# Patient Record
Sex: FEMALE | Race: WHITE | NOT HISPANIC OR LATINO | Employment: FULL TIME | URBAN - METROPOLITAN AREA
[De-identification: names, ages, dates, MRNs, and addresses within clinical notes are randomized per-mention and may not be internally consistent; named-entity substitution may affect disease eponyms.]

---

## 2017-11-14 PROCEDURE — 88304 TISSUE EXAM BY PATHOLOGIST: CPT | Performed by: OTOLARYNGOLOGY

## 2017-11-16 ENCOUNTER — LAB REQUISITION (OUTPATIENT)
Dept: LAB | Facility: HOSPITAL | Age: 57
End: 2017-11-16
Payer: COMMERCIAL

## 2017-11-16 DIAGNOSIS — L72.11 PILAR CYST: ICD-10-CM

## 2018-04-10 LAB
T4 TOTAL (HISTORICAL): 8.33 UG/DL (ref 6.1–12.2)
TSH SERPL DL<=0.05 MIU/L-ACNC: 0.51 UIU/M (ref 0.45–5.33)

## 2018-09-08 ENCOUNTER — TRANSCRIBE ORDERS (OUTPATIENT)
Dept: ADMINISTRATIVE | Facility: HOSPITAL | Age: 58
End: 2018-09-08

## 2018-09-08 ENCOUNTER — APPOINTMENT (OUTPATIENT)
Dept: LAB | Facility: HOSPITAL | Age: 58
End: 2018-09-08

## 2018-09-08 DIAGNOSIS — Z00.8 HEALTH EXAMINATION IN POPULATION SURVEY: ICD-10-CM

## 2018-09-08 DIAGNOSIS — Z00.8 HEALTH EXAMINATION IN POPULATION SURVEY: Primary | ICD-10-CM

## 2018-09-08 LAB
CHOLEST SERPL-MCNC: 184 MG/DL (ref 50–200)
EST. AVERAGE GLUCOSE BLD GHB EST-MCNC: 114 MG/DL
HBA1C MFR BLD: 5.6 % (ref 4.2–6.3)
HDLC SERPL-MCNC: 52 MG/DL (ref 40–60)
LDLC SERPL CALC-MCNC: 118 MG/DL (ref 0–100)
NONHDLC SERPL-MCNC: 132 MG/DL
TRIGL SERPL-MCNC: 71 MG/DL

## 2018-09-08 PROCEDURE — 83036 HEMOGLOBIN GLYCOSYLATED A1C: CPT

## 2018-09-08 PROCEDURE — 80061 LIPID PANEL: CPT

## 2018-09-08 PROCEDURE — 36415 COLL VENOUS BLD VENIPUNCTURE: CPT

## 2019-03-27 ENCOUNTER — APPOINTMENT (OUTPATIENT)
Dept: LAB | Facility: CLINIC | Age: 59
End: 2019-03-27
Payer: COMMERCIAL

## 2019-03-27 ENCOUNTER — TRANSCRIBE ORDERS (OUTPATIENT)
Dept: ADMINISTRATIVE | Facility: HOSPITAL | Age: 59
End: 2019-03-27

## 2019-03-27 DIAGNOSIS — R94.6 NONSPECIFIC ABNORMAL RESULTS OF THYROID FUNCTION STUDY: ICD-10-CM

## 2019-03-27 DIAGNOSIS — Z13.6 SCREENING FOR ISCHEMIC HEART DISEASE: ICD-10-CM

## 2019-03-27 DIAGNOSIS — R94.6 NONSPECIFIC ABNORMAL RESULTS OF THYROID FUNCTION STUDY: Primary | ICD-10-CM

## 2019-03-27 LAB
ALBUMIN SERPL BCP-MCNC: 4 G/DL (ref 3.5–5)
ALP SERPL-CCNC: 77 U/L (ref 46–116)
ALT SERPL W P-5'-P-CCNC: 22 U/L (ref 12–78)
ANION GAP SERPL CALCULATED.3IONS-SCNC: 3 MMOL/L (ref 4–13)
AST SERPL W P-5'-P-CCNC: 14 U/L (ref 5–45)
BASOPHILS # BLD AUTO: 0.09 THOUSANDS/ΜL (ref 0–0.1)
BASOPHILS NFR BLD AUTO: 2 % (ref 0–1)
BILIRUB SERPL-MCNC: 0.86 MG/DL (ref 0.2–1)
BUN SERPL-MCNC: 17 MG/DL (ref 5–25)
CALCIUM SERPL-MCNC: 9 MG/DL (ref 8.3–10.1)
CHLORIDE SERPL-SCNC: 107 MMOL/L (ref 100–108)
CHOLEST SERPL-MCNC: 157 MG/DL (ref 50–200)
CO2 SERPL-SCNC: 28 MMOL/L (ref 21–32)
CREAT SERPL-MCNC: 0.74 MG/DL (ref 0.6–1.3)
EOSINOPHIL # BLD AUTO: 0.09 THOUSAND/ΜL (ref 0–0.61)
EOSINOPHIL NFR BLD AUTO: 2 % (ref 0–6)
ERYTHROCYTE [DISTWIDTH] IN BLOOD BY AUTOMATED COUNT: 12.8 % (ref 11.6–15.1)
GFR SERPL CREATININE-BSD FRML MDRD: 90 ML/MIN/1.73SQ M
GLUCOSE P FAST SERPL-MCNC: 89 MG/DL (ref 65–99)
HCT VFR BLD AUTO: 41.4 % (ref 34.8–46.1)
HDLC SERPL-MCNC: 51 MG/DL (ref 40–60)
HGB BLD-MCNC: 12.9 G/DL (ref 11.5–15.4)
IMM GRANULOCYTES # BLD AUTO: 0.01 THOUSAND/UL (ref 0–0.2)
IMM GRANULOCYTES NFR BLD AUTO: 0 % (ref 0–2)
LDLC SERPL CALC-MCNC: 97 MG/DL (ref 0–100)
LYMPHOCYTES # BLD AUTO: 1.98 THOUSANDS/ΜL (ref 0.6–4.47)
LYMPHOCYTES NFR BLD AUTO: 42 % (ref 14–44)
MCH RBC QN AUTO: 30 PG (ref 26.8–34.3)
MCHC RBC AUTO-ENTMCNC: 31.2 G/DL (ref 31.4–37.4)
MCV RBC AUTO: 96 FL (ref 82–98)
MONOCYTES # BLD AUTO: 0.4 THOUSAND/ΜL (ref 0.17–1.22)
MONOCYTES NFR BLD AUTO: 9 % (ref 4–12)
NEUTROPHILS # BLD AUTO: 2.16 THOUSANDS/ΜL (ref 1.85–7.62)
NEUTS SEG NFR BLD AUTO: 45 % (ref 43–75)
NONHDLC SERPL-MCNC: 106 MG/DL
NRBC BLD AUTO-RTO: 0 /100 WBCS
PLATELET # BLD AUTO: 205 THOUSANDS/UL (ref 149–390)
PMV BLD AUTO: 12.6 FL (ref 8.9–12.7)
POTASSIUM SERPL-SCNC: 4 MMOL/L (ref 3.5–5.3)
PROT SERPL-MCNC: 7.4 G/DL (ref 6.4–8.2)
RBC # BLD AUTO: 4.3 MILLION/UL (ref 3.81–5.12)
SODIUM SERPL-SCNC: 138 MMOL/L (ref 136–145)
T4 SERPL-MCNC: 10.3 UG/DL (ref 4.7–13.3)
TRIGL SERPL-MCNC: 47 MG/DL
TSH SERPL DL<=0.05 MIU/L-ACNC: 0.93 UIU/ML (ref 0.36–3.74)
WBC # BLD AUTO: 4.73 THOUSAND/UL (ref 4.31–10.16)

## 2019-03-27 PROCEDURE — 85025 COMPLETE CBC W/AUTO DIFF WBC: CPT

## 2019-03-27 PROCEDURE — 84436 ASSAY OF TOTAL THYROXINE: CPT

## 2019-03-27 PROCEDURE — 36415 COLL VENOUS BLD VENIPUNCTURE: CPT

## 2019-03-27 PROCEDURE — 84443 ASSAY THYROID STIM HORMONE: CPT

## 2019-03-27 PROCEDURE — 80053 COMPREHEN METABOLIC PANEL: CPT

## 2019-03-27 PROCEDURE — 80061 LIPID PANEL: CPT

## 2019-03-29 ENCOUNTER — HOSPITAL ENCOUNTER (OUTPATIENT)
Dept: RADIOLOGY | Facility: HOSPITAL | Age: 59
Discharge: HOME | End: 2019-03-29
Payer: COMMERCIAL

## 2019-03-29 ENCOUNTER — TRANSCRIBE ORDERS (OUTPATIENT)
Dept: ADMINISTRATIVE | Facility: HOSPITAL | Age: 59
End: 2019-03-29

## 2019-03-29 DIAGNOSIS — E04.1 NONTOXIC UNINODULAR GOITER: Primary | ICD-10-CM

## 2019-03-29 DIAGNOSIS — R06.02 SHORTNESS OF BREATH: ICD-10-CM

## 2019-03-29 PROCEDURE — 71046 X-RAY EXAM CHEST 2 VIEWS: CPT

## 2019-04-01 ENCOUNTER — HOSPITAL ENCOUNTER (OUTPATIENT)
Dept: ULTRASOUND IMAGING | Facility: HOSPITAL | Age: 59
Discharge: HOME | End: 2019-04-01
Payer: COMMERCIAL

## 2019-04-01 DIAGNOSIS — E04.1 NONTOXIC UNINODULAR GOITER: ICD-10-CM

## 2019-04-01 PROCEDURE — 76536 US EXAM OF HEAD AND NECK: CPT

## 2019-04-16 ENCOUNTER — TRANSCRIBE ORDERS (OUTPATIENT)
Dept: ADMINISTRATIVE | Facility: HOSPITAL | Age: 59
End: 2019-04-16

## 2019-04-16 ENCOUNTER — APPOINTMENT (OUTPATIENT)
Dept: LAB | Facility: HOSPITAL | Age: 59
End: 2019-04-16
Payer: COMMERCIAL

## 2019-04-16 DIAGNOSIS — J06.9 ACUTE RESPIRATORY DISEASE: Primary | ICD-10-CM

## 2019-04-16 DIAGNOSIS — J06.9 ACUTE RESPIRATORY DISEASE: ICD-10-CM

## 2019-04-16 PROCEDURE — 87631 RESP VIRUS 3-5 TARGETS: CPT

## 2019-04-17 LAB
FLUAV AG SPEC QL: NOT DETECTED
FLUBV AG SPEC QL: NOT DETECTED
RSV B RNA SPEC QL NAA+PROBE: NOT DETECTED

## 2019-06-07 ENCOUNTER — TRANSCRIBE ORDERS (OUTPATIENT)
Dept: ADMINISTRATIVE | Facility: HOSPITAL | Age: 59
End: 2019-06-07

## 2019-06-07 DIAGNOSIS — Z12.39 BREAST SCREENING, UNSPECIFIED: Primary | ICD-10-CM

## 2019-06-08 ENCOUNTER — HOSPITAL ENCOUNTER (OUTPATIENT)
Dept: MAMMOGRAPHY | Facility: HOSPITAL | Age: 59
Discharge: HOME | End: 2019-06-08
Attending: SPECIALIST
Payer: COMMERCIAL

## 2019-06-08 VITALS — BODY MASS INDEX: 28.88 KG/M2 | WEIGHT: 195 LBS | HEIGHT: 69 IN

## 2019-06-08 DIAGNOSIS — Z12.39 BREAST SCREENING, UNSPECIFIED: ICD-10-CM

## 2019-06-08 PROCEDURE — 77067 SCR MAMMO BI INCL CAD: CPT

## 2019-06-08 PROCEDURE — 77063 BREAST TOMOSYNTHESIS BI: CPT

## 2019-09-24 ENCOUNTER — TRANSCRIBE ORDERS (OUTPATIENT)
Dept: ADMINISTRATIVE | Facility: HOSPITAL | Age: 59
End: 2019-09-24

## 2019-09-24 DIAGNOSIS — R22.41 LOWER LEG MASS, RIGHT: Primary | ICD-10-CM

## 2019-09-24 DIAGNOSIS — M79.604 RIGHT LEG PAIN: ICD-10-CM

## 2019-09-25 ENCOUNTER — HOSPITAL ENCOUNTER (OUTPATIENT)
Dept: NON INVASIVE DIAGNOSTICS | Facility: CLINIC | Age: 59
Discharge: HOME | End: 2019-09-25
Payer: COMMERCIAL

## 2019-09-25 DIAGNOSIS — M79.604 RIGHT LEG PAIN: ICD-10-CM

## 2019-09-25 DIAGNOSIS — R22.41 LOWER LEG MASS, RIGHT: ICD-10-CM

## 2019-09-25 PROCEDURE — 93971 EXTREMITY STUDY: CPT

## 2019-09-25 PROCEDURE — 93971 EXTREMITY STUDY: CPT | Performed by: SURGERY

## 2019-09-27 ENCOUNTER — TRANSCRIBE ORDERS (OUTPATIENT)
Dept: ADMINISTRATIVE | Facility: HOSPITAL | Age: 59
End: 2019-09-27

## 2019-10-07 ENCOUNTER — APPOINTMENT (OUTPATIENT)
Dept: RADIOLOGY | Facility: CLINIC | Age: 59
End: 2019-10-07
Payer: COMMERCIAL

## 2019-10-07 ENCOUNTER — TRANSCRIBE ORDERS (OUTPATIENT)
Dept: ADMINISTRATIVE | Facility: HOSPITAL | Age: 59
End: 2019-10-07

## 2019-10-07 DIAGNOSIS — M25.561 RIGHT KNEE PAIN, UNSPECIFIED CHRONICITY: Primary | ICD-10-CM

## 2019-10-07 DIAGNOSIS — R22.41 KNEE MASS, RIGHT: ICD-10-CM

## 2019-10-07 PROCEDURE — 73560 X-RAY EXAM OF KNEE 1 OR 2: CPT

## 2020-02-19 ENCOUNTER — OCCMED (OUTPATIENT)
Dept: URGENT CARE | Facility: CLINIC | Age: 60
End: 2020-02-19
Payer: OTHER MISCELLANEOUS

## 2020-02-19 ENCOUNTER — APPOINTMENT (OUTPATIENT)
Dept: RADIOLOGY | Facility: CLINIC | Age: 60
End: 2020-02-19
Payer: OTHER MISCELLANEOUS

## 2020-02-19 DIAGNOSIS — R07.81 RIB PAIN ON RIGHT SIDE: Primary | ICD-10-CM

## 2020-02-19 DIAGNOSIS — R07.81 RIB PAIN ON RIGHT SIDE: ICD-10-CM

## 2020-02-19 PROCEDURE — 99283 EMERGENCY DEPT VISIT LOW MDM: CPT | Performed by: PHYSICIAN ASSISTANT

## 2020-02-19 PROCEDURE — 71101 X-RAY EXAM UNILAT RIBS/CHEST: CPT

## 2020-02-19 PROCEDURE — G0382 LEV 3 HOSP TYPE B ED VISIT: HCPCS | Performed by: PHYSICIAN ASSISTANT

## 2020-02-25 ENCOUNTER — APPOINTMENT (OUTPATIENT)
Dept: URGENT CARE | Facility: CLINIC | Age: 60
End: 2020-02-25
Payer: OTHER MISCELLANEOUS

## 2020-02-25 PROCEDURE — 99213 OFFICE O/P EST LOW 20 MIN: CPT

## 2020-08-25 ENCOUNTER — LAB REQUISITION (OUTPATIENT)
Dept: LAB | Facility: HOSPITAL | Age: 60
End: 2020-08-25
Payer: COMMERCIAL

## 2020-08-25 ENCOUNTER — TRANSCRIBE ORDERS (OUTPATIENT)
Dept: ADMINISTRATIVE | Facility: HOSPITAL | Age: 60
End: 2020-08-25

## 2020-08-25 DIAGNOSIS — Z12.31 ENCOUNTER FOR SCREENING MAMMOGRAM FOR MALIGNANT NEOPLASM OF BREAST: Primary | ICD-10-CM

## 2020-08-25 DIAGNOSIS — Z01.419 ENCOUNTER FOR GYNECOLOGICAL EXAMINATION (GENERAL) (ROUTINE) WITHOUT ABNORMAL FINDINGS: ICD-10-CM

## 2020-08-26 ENCOUNTER — HOSPITAL ENCOUNTER (OUTPATIENT)
Dept: MAMMOGRAPHY | Facility: HOSPITAL | Age: 60
Discharge: HOME | End: 2020-08-26
Attending: SPECIALIST
Payer: COMMERCIAL

## 2020-08-26 VITALS — WEIGHT: 200 LBS | HEIGHT: 69 IN | BODY MASS INDEX: 29.62 KG/M2

## 2020-08-26 DIAGNOSIS — Z12.31 ENCOUNTER FOR SCREENING MAMMOGRAM FOR MALIGNANT NEOPLASM OF BREAST: ICD-10-CM

## 2020-08-26 PROCEDURE — 77067 SCR MAMMO BI INCL CAD: CPT

## 2020-08-26 PROCEDURE — 88175 CYTOPATH C/V AUTO FLUID REDO: CPT | Performed by: SPECIALIST

## 2020-08-26 PROCEDURE — 77063 BREAST TOMOSYNTHESIS BI: CPT

## 2020-09-01 LAB
LAB AP GYN PRIMARY INTERPRETATION: NORMAL
Lab: NORMAL

## 2020-09-02 PROBLEM — R06.09 DOE (DYSPNEA ON EXERTION): Status: ACTIVE | Noted: 2019-09-19

## 2020-09-02 PROBLEM — R06.00 DOE (DYSPNEA ON EXERTION): Status: ACTIVE | Noted: 2019-09-19

## 2020-09-02 RX ORDER — ALBUTEROL SULFATE 90 UG/1
1 AEROSOL, METERED RESPIRATORY (INHALATION) EVERY 6 HOURS PRN
COMMUNITY
Start: 2019-09-19 | End: 2020-09-03 | Stop reason: ALTCHOICE

## 2020-09-02 NOTE — PROGRESS NOTES
Darlene Ly 1960 female MRN: [id number]      ASSESSMENT/PLAN  Problem List Items Addressed This Visit        Other    Healthcare maintenance - Primary    Lump of right thigh    Relevant Orders    US extremity soft tissue      Other Visit Diagnoses     Screening for diabetes mellitus        Relevant Orders    Comprehensive metabolic panel    Screening, lipid        Relevant Orders    Lipid panel    Encounter for hepatitis C screening test for low risk patient        Relevant Orders    Hepatitis C antibody    Screening for HIV (human immunodeficiency virus)        Relevant Orders    HIV 1/2 Antigen/Antibody (4th Generation) w Reflex [facility]        BP WNL  BMI as below   CMP + Lipids to screen for HLD, DM   HIV, Hep C screening ordered, pt agreeable    Mammo UTD (08/2020)  Pap UTD (08/2020)   CRC Screening: Has a colonoscopy at 49 with Dr Tana Victor in [facility] -- normal, but incomplete     BMI Counseling: Body mass index is 30 04 kg/m²  The BMI is above normal  Nutrition recommendations include 3-5 servings of fruits/vegetables daily and consuming healthier snacks  Exercise recommendations include exercising 3-5 times per week  Encouraged regular dental and eye exams  No future appointments  SUBJECTIVE  CC: Establish Care (believes to lypoma to have on the back of leg - had it checked out many years ago - just here for a routine check up )      HPI:  Darlene Ly is a 63 y o  female who presents to establish care  History reviewed and updated as below  Years ago, had a lump on the back of her R thigh  Thinks it is a lipoma, and would like to have it evaluated  Review of Systems   Constitutional: Negative for unexpected weight change  HENT: Negative for congestion, ear pain, rhinorrhea and sore throat  Eyes: Negative for visual disturbance  Respiratory: Negative for shortness of breath  Cardiovascular: Negative for chest pain, palpitations and leg swelling     Gastrointestinal: Negative for abdominal pain, constipation and diarrhea  Endocrine: Negative for polyuria  Genitourinary: Negative for dysuria and menstrual problem (stopped at 50)  Neurological: Positive for headaches (if she doesn't sleep enough, or drinks too much caffiene)  Negative for dizziness and light-headedness  Psychiatric/Behavioral: Negative for sleep disturbance (on occasion)  Historical Information   The patient history was reviewed and updated as follows:    Past Medical History:   Diagnosis Date    No pertinent past medical history      Past Surgical History:   Procedure Laterality Date    ANKLE SURGERY      ROTATOR CUFF REPAIR Right     THYROIDECTOMY, PARTIAL      TUBAL LIGATION      VARICOSE VEIN SURGERY       Family History   Problem Relation Age of Onset    No Known Problems Mother     Diabetes Father     Lymphoma Sister     No Known Problems Daughter     No Known Problems Sister     Lymphoma Daughter     No Known Problems Daughter     No Known Problems Maternal Aunt       Social History   Social History     Substance and Sexual Activity   Alcohol Use Not Currently     Social History     Substance and Sexual Activity   Drug Use Not Currently     Social History     Tobacco Use   Smoking Status Never Smoker   Smokeless Tobacco Never Used       Medications:   No current outpatient medications on file  No Known Allergies    OBJECTIVE    Vitals:   Vitals:    09/03/20 0806   BP: 108/62   Pulse: 59   Temp: (!) 97 2 °F (36 2 °C)   SpO2: 96%   Weight: 92 3 kg (203 lb 6 4 oz)   Height: 5' 9" (1 753 m)           Physical Exam  Vitals signs and nursing note reviewed  Constitutional:       General: She is not in acute distress  Appearance: Normal appearance  HENT:      Head: Normocephalic and atraumatic        Right Ear: Tympanic membrane and ear canal normal       Left Ear: Tympanic membrane and ear canal normal       Nose: Nose normal       Mouth/Throat:      Mouth: Mucous membranes are moist       Pharynx: No oropharyngeal exudate or posterior oropharyngeal erythema  Eyes:      Conjunctiva/sclera: Conjunctivae normal    Cardiovascular:      Rate and Rhythm: Normal rate and regular rhythm  Pulmonary:      Effort: Pulmonary effort is normal  No respiratory distress  Breath sounds: Normal breath sounds  Abdominal:      General: Bowel sounds are normal  There is no distension  Palpations: Abdomen is soft  Tenderness: There is no abdominal tenderness  Musculoskeletal:      Right lower leg: No edema  Left lower leg: No edema  Legs:    Lymphadenopathy:      Cervical: No cervical adenopathy  Skin:     General: Skin is warm and dry  Neurological:      General: No focal deficit present  Mental Status: She is alert     Psychiatric:         Mood and Affect: Mood normal                     Noemí Satnizo DO  [facility]   9/3/2020  8:35 AM

## 2020-09-03 ENCOUNTER — TELEPHONE (OUTPATIENT)
Dept: FAMILY MEDICINE CLINIC | Facility: CLINIC | Age: 60
End: 2020-09-03

## 2020-09-03 ENCOUNTER — OFFICE VISIT (OUTPATIENT)
Dept: FAMILY MEDICINE CLINIC | Facility: CLINIC | Age: 60
End: 2020-09-03
Payer: COMMERCIAL

## 2020-09-03 ENCOUNTER — TELEPHONE (OUTPATIENT)
Dept: ADMINISTRATIVE | Facility: OTHER | Age: 60
End: 2020-09-03

## 2020-09-03 VITALS
HEIGHT: 69 IN | TEMPERATURE: 97.2 F | SYSTOLIC BLOOD PRESSURE: 108 MMHG | WEIGHT: 203.4 LBS | OXYGEN SATURATION: 96 % | BODY MASS INDEX: 30.13 KG/M2 | DIASTOLIC BLOOD PRESSURE: 62 MMHG | HEART RATE: 59 BPM

## 2020-09-03 DIAGNOSIS — Z00.00 HEALTHCARE MAINTENANCE: Primary | ICD-10-CM

## 2020-09-03 DIAGNOSIS — Z86.2 HISTORY OF ANEMIA: Primary | ICD-10-CM

## 2020-09-03 DIAGNOSIS — Z13.220 SCREENING, LIPID: ICD-10-CM

## 2020-09-03 DIAGNOSIS — Z11.59 ENCOUNTER FOR HEPATITIS C SCREENING TEST FOR LOW RISK PATIENT: ICD-10-CM

## 2020-09-03 DIAGNOSIS — Z11.4 SCREENING FOR HIV (HUMAN IMMUNODEFICIENCY VIRUS): ICD-10-CM

## 2020-09-03 DIAGNOSIS — Z13.1 SCREENING FOR DIABETES MELLITUS: ICD-10-CM

## 2020-09-03 DIAGNOSIS — R22.41 LUMP OF RIGHT THIGH: ICD-10-CM

## 2020-09-03 PROBLEM — R06.09 DOE (DYSPNEA ON EXERTION): Status: RESOLVED | Noted: 2019-09-19 | Resolved: 2020-09-03

## 2020-09-03 PROBLEM — R06.00 DOE (DYSPNEA ON EXERTION): Status: RESOLVED | Noted: 2019-09-19 | Resolved: 2020-09-03

## 2020-09-03 PROCEDURE — 99386 PREV VISIT NEW AGE 40-64: CPT | Performed by: FAMILY MEDICINE

## 2020-09-03 NOTE — TELEPHONE ENCOUNTER
Upon review of the In Basket request and the patient's chart, initial outreach has been made via fax, please see Contacts section for details  A second outreach attempt will be made within 5 business days      Thank you  Inderjit Coffman

## 2020-09-03 NOTE — LETTER
Procedure Request Form: Colonoscopy      Date Requested: 20  Patient: Little Amarilis  Patient : 1960   Referring Provider: Sandra Powers, DO        Date of Procedure ______________________________       The above patient has informed us that they have completed their   most recent Colonoscopy at your facility  Please complete   this form and attach all corresponding procedure reports/results  Comments __________________________________________________________  ____________________________________________________________________  ____________________________________________________________________  ____________________________________________________________________    Facility Completing Procedure _________________________________________    Form Completed By (print name) _______________________________________      Signature __________________________________________________________      These reports are needed for  compliance    Please fax this completed form and a copy of the procedure report to our office located at [place] [address] as soon as possible to [phone] debbie Soler: Phone [phone]    We thank you for your assistance in treating our mutual patient

## 2020-09-03 NOTE — TELEPHONE ENCOUNTER
Pt forgot to mention she has a history of anemia  The dr usually did a CBC with Dif routinely  One was not ordered  Can you please order shes doing her labs in he morning      Thanks

## 2020-09-03 NOTE — PROGRESS NOTES
BMI Counseling: Body mass index is 30 04 kg/m²  The BMI is above normal  Nutrition recommendations include reducing portion sizes and 3-5 servings of fruits/vegetables daily

## 2020-09-03 NOTE — LETTER
Procedure Request Form: Colonoscopy      Date Requested: 20  Patient: Radha Amabile  Patient : 1960   Referring Provider: Nick Chalet, DO        Date of Procedure ______________________________       The above patient has informed us that they have completed their   most recent Colonoscopy at your facility  Please complete   this form and attach all corresponding procedure reports/results  Comments __________________________________________________________  ____________________________________________________________________  ____________________________________________________________________  ____________________________________________________________________    Facility Completing Procedure _________________________________________    Form Completed By (print name) _______________________________________      Signature __________________________________________________________      These reports are needed for  compliance    Please fax this completed form and a copy of the procedure report to our office located at [place] [address] as soon as possible to [phone] debbie Soler: Phone [phone]    We thank you for your assistance in treating our mutual patient

## 2020-09-04 ENCOUNTER — APPOINTMENT (OUTPATIENT)
Dept: LAB | Facility: CLINIC | Age: 60
End: 2020-09-04
Payer: COMMERCIAL

## 2020-09-04 DIAGNOSIS — Z13.1 SCREENING FOR DIABETES MELLITUS: ICD-10-CM

## 2020-09-04 DIAGNOSIS — Z86.2 HISTORY OF ANEMIA: ICD-10-CM

## 2020-09-04 DIAGNOSIS — Z11.59 ENCOUNTER FOR HEPATITIS C SCREENING TEST FOR LOW RISK PATIENT: ICD-10-CM

## 2020-09-04 DIAGNOSIS — Z11.4 SCREENING FOR HIV (HUMAN IMMUNODEFICIENCY VIRUS): ICD-10-CM

## 2020-09-04 DIAGNOSIS — Z13.220 SCREENING, LIPID: ICD-10-CM

## 2020-09-04 LAB
ALBUMIN SERPL BCP-MCNC: 4 G/DL (ref 3.5–5)
ALP SERPL-CCNC: 75 U/L (ref 46–116)
ALT SERPL W P-5'-P-CCNC: 20 U/L (ref 12–78)
ANION GAP SERPL CALCULATED.3IONS-SCNC: 5 MMOL/L (ref 4–13)
AST SERPL W P-5'-P-CCNC: 12 U/L (ref 5–45)
BASOPHILS # BLD AUTO: 0.09 THOUSANDS/ΜL (ref 0–0.1)
BASOPHILS NFR BLD AUTO: 2 % (ref 0–1)
BILIRUB SERPL-MCNC: 1.21 MG/DL (ref 0.2–1)
BUN SERPL-MCNC: 21 MG/DL (ref 5–25)
CALCIUM SERPL-MCNC: 9.4 MG/DL (ref 8.3–10.1)
CHLORIDE SERPL-SCNC: 107 MMOL/L (ref 100–108)
CHOLEST SERPL-MCNC: 186 MG/DL (ref 50–200)
CO2 SERPL-SCNC: 29 MMOL/L (ref 21–32)
CREAT SERPL-MCNC: 0.77 MG/DL (ref 0.6–1.3)
EOSINOPHIL # BLD AUTO: 0.15 THOUSAND/ΜL (ref 0–0.61)
EOSINOPHIL NFR BLD AUTO: 3 % (ref 0–6)
ERYTHROCYTE [DISTWIDTH] IN BLOOD BY AUTOMATED COUNT: 12.7 % (ref 11.6–15.1)
GFR SERPL CREATININE-BSD FRML MDRD: 85 ML/MIN/1.73SQ M
GLUCOSE P FAST SERPL-MCNC: 83 MG/DL (ref 65–99)
HCT VFR BLD AUTO: 41.8 % (ref 34.8–46.1)
HCV AB SER QL: NORMAL
HDLC SERPL-MCNC: 54 MG/DL
HGB BLD-MCNC: 13.1 G/DL (ref 11.5–15.4)
IMM GRANULOCYTES # BLD AUTO: 0.01 THOUSAND/UL (ref 0–0.2)
IMM GRANULOCYTES NFR BLD AUTO: 0 % (ref 0–2)
LDLC SERPL CALC-MCNC: 119 MG/DL (ref 0–100)
LYMPHOCYTES # BLD AUTO: 1.8 THOUSANDS/ΜL (ref 0.6–4.47)
LYMPHOCYTES NFR BLD AUTO: 34 % (ref 14–44)
MCH RBC QN AUTO: 30.3 PG (ref 26.8–34.3)
MCHC RBC AUTO-ENTMCNC: 31.3 G/DL (ref 31.4–37.4)
MCV RBC AUTO: 97 FL (ref 82–98)
MONOCYTES # BLD AUTO: 0.44 THOUSAND/ΜL (ref 0.17–1.22)
MONOCYTES NFR BLD AUTO: 8 % (ref 4–12)
NEUTROPHILS # BLD AUTO: 2.8 THOUSANDS/ΜL (ref 1.85–7.62)
NEUTS SEG NFR BLD AUTO: 53 % (ref 43–75)
NONHDLC SERPL-MCNC: 132 MG/DL
NRBC BLD AUTO-RTO: 0 /100 WBCS
PLATELET # BLD AUTO: 229 THOUSANDS/UL (ref 149–390)
PMV BLD AUTO: 11.6 FL (ref 8.9–12.7)
POTASSIUM SERPL-SCNC: 4.5 MMOL/L (ref 3.5–5.3)
PROT SERPL-MCNC: 7.2 G/DL (ref 6.4–8.2)
RBC # BLD AUTO: 4.33 MILLION/UL (ref 3.81–5.12)
SODIUM SERPL-SCNC: 141 MMOL/L (ref 136–145)
TRIGL SERPL-MCNC: 63 MG/DL
WBC # BLD AUTO: 5.29 THOUSAND/UL (ref 4.31–10.16)

## 2020-09-04 PROCEDURE — 87389 HIV-1 AG W/HIV-1&-2 AB AG IA: CPT

## 2020-09-04 PROCEDURE — 36415 COLL VENOUS BLD VENIPUNCTURE: CPT

## 2020-09-04 PROCEDURE — 80061 LIPID PANEL: CPT

## 2020-09-04 PROCEDURE — 86803 HEPATITIS C AB TEST: CPT

## 2020-09-04 PROCEDURE — 85025 COMPLETE CBC W/AUTO DIFF WBC: CPT

## 2020-09-04 PROCEDURE — 80053 COMPREHEN METABOLIC PANEL: CPT

## 2020-09-09 LAB — HIV 1+2 AB+HIV1 P24 AG SERPL QL IA: NORMAL

## 2020-09-09 NOTE — TELEPHONE ENCOUNTER
As a follow-up, a second attempt has been made for outreach via fax, please see Contacts section for details  A third and final attempt will be made within 5 business days      Thank you  Mandi Byrnes

## 2020-09-14 NOTE — TELEPHONE ENCOUNTER
As a final attempt, a third outreach has been made via telephone call  Please see Contacts section for details  This encounter will be closed and completed by end of day  Should we receive the requested information because of previous outreach attempts, the requested patient's chart will be updated appropriately       Thank you  Larose Fothergill

## 2020-10-07 ENCOUNTER — TRANSCRIBE ORDERS (OUTPATIENT)
Dept: ADMINISTRATIVE | Facility: HOSPITAL | Age: 60
End: 2020-10-07

## 2020-10-07 DIAGNOSIS — R10.9 STOMACH ACHE: Primary | ICD-10-CM

## 2020-10-12 ENCOUNTER — TRANSCRIBE ORDERS (OUTPATIENT)
Dept: LAB | Facility: HOSPITAL | Age: 60
End: 2020-10-12

## 2020-10-12 ENCOUNTER — HOSPITAL ENCOUNTER (OUTPATIENT)
Dept: ULTRASOUND IMAGING | Facility: HOSPITAL | Age: 60
Discharge: HOME | End: 2020-10-12
Payer: COMMERCIAL

## 2020-10-12 ENCOUNTER — LAB (OUTPATIENT)
Dept: LAB | Facility: HOSPITAL | Age: 60
End: 2020-10-12
Payer: COMMERCIAL

## 2020-10-12 DIAGNOSIS — R10.9 STOMACH ACHE: ICD-10-CM

## 2020-10-12 DIAGNOSIS — R22.41 LUMP OF RIGHT THIGH: ICD-10-CM

## 2020-10-12 DIAGNOSIS — R10.32 LLQ PAIN: ICD-10-CM

## 2020-10-12 DIAGNOSIS — R10.9 STOMACH ACHE: Primary | ICD-10-CM

## 2020-10-12 LAB
ALBUMIN SERPL BCP-MCNC: 4.5 G/DL (ref 3.5–5.7)
ALP SERPL-CCNC: 58 U/L (ref 40–150)
ALT SERPL W P-5'-P-CCNC: 13 U/L (ref 7–52)
ANION GAP SERPL CALCULATED.3IONS-SCNC: 5 MMOL/L (ref 4–13)
AST SERPL W P-5'-P-CCNC: 14 U/L (ref 13–39)
BILIRUB DIRECT SERPL-MCNC: 0.2 MG/DL (ref 0–0.2)
BILIRUB SERPL-MCNC: 1.2 MG/DL (ref 0.2–1)
BILIRUB UR QL STRIP: NEGATIVE
BUN SERPL-MCNC: 15 MG/DL (ref 7–25)
CALCIUM SERPL-MCNC: 9.5 MG/DL (ref 8.6–10.5)
CHLORIDE SERPL-SCNC: 101 MMOL/L (ref 98–107)
CLARITY UR: CLEAR
CO2 SERPL-SCNC: 31 MMOL/L (ref 21–31)
COLOR UR: ABNORMAL
CREAT SERPL-MCNC: 0.66 MG/DL (ref 0.6–1.2)
ERYTHROCYTE [DISTWIDTH] IN BLOOD BY AUTOMATED COUNT: 13 % (ref 11.5–14.5)
GFR SERPL CREATININE-BSD FRML MDRD: 98 ML/MIN/1.73SQ M
GLUCOSE P FAST SERPL-MCNC: 93 MG/DL (ref 65–99)
GLUCOSE UR STRIP-MCNC: NEGATIVE MG/DL
HCT VFR BLD AUTO: 39.4 % (ref 42–47)
HGB BLD-MCNC: 13.5 G/DL (ref 12–16)
HGB UR QL STRIP.AUTO: NEGATIVE
KETONES UR STRIP-MCNC: NEGATIVE MG/DL
LEUKOCYTE ESTERASE UR QL STRIP: NEGATIVE
MCH RBC QN AUTO: 31.1 PG (ref 26–34)
MCHC RBC AUTO-ENTMCNC: 34.2 G/DL (ref 31–37)
MCV RBC AUTO: 91 FL (ref 81–99)
NITRITE UR QL STRIP: NEGATIVE
PH UR STRIP.AUTO: 7 [PH]
PLATELET # BLD AUTO: 217 THOUSANDS/UL (ref 149–390)
PMV BLD AUTO: 10 FL (ref 8.6–11.7)
POTASSIUM SERPL-SCNC: 3.8 MMOL/L (ref 3.5–5.5)
PROT SERPL-MCNC: 7.1 G/DL (ref 6.4–8.9)
PROT UR STRIP-MCNC: NEGATIVE MG/DL
RBC # BLD AUTO: 4.33 MILLION/UL (ref 3.9–5.2)
SODIUM SERPL-SCNC: 137 MMOL/L (ref 134–143)
SP GR UR STRIP.AUTO: <=1.005 (ref 1–1.03)
UROBILINOGEN UR QL STRIP.AUTO: 0.2 E.U./DL
WBC # BLD AUTO: 5.9 THOUSAND/UL (ref 4.8–10.8)

## 2020-10-12 PROCEDURE — 36415 COLL VENOUS BLD VENIPUNCTURE: CPT

## 2020-10-12 PROCEDURE — 76830 TRANSVAGINAL US NON-OB: CPT

## 2020-10-12 PROCEDURE — 80076 HEPATIC FUNCTION PANEL: CPT

## 2020-10-12 PROCEDURE — 76700 US EXAM ABDOM COMPLETE: CPT

## 2020-10-12 PROCEDURE — 85027 COMPLETE CBC AUTOMATED: CPT

## 2020-10-12 PROCEDURE — 76856 US EXAM PELVIC COMPLETE: CPT

## 2020-10-12 PROCEDURE — 76882 US LMTD JT/FCL EVL NVASC XTR: CPT

## 2020-10-12 PROCEDURE — 81003 URINALYSIS AUTO W/O SCOPE: CPT

## 2020-10-12 PROCEDURE — 80048 BASIC METABOLIC PNL TOTAL CA: CPT

## 2020-10-20 ENCOUNTER — LAB REQUISITION (OUTPATIENT)
Dept: LAB | Facility: HOSPITAL | Age: 60
End: 2020-10-20
Payer: COMMERCIAL

## 2020-10-20 DIAGNOSIS — N85.00 ENDOMETRIAL HYPERPLASIA, UNSPECIFIED: ICD-10-CM

## 2020-10-20 PROCEDURE — 88305 TISSUE EXAM BY PATHOLOGIST: CPT | Performed by: PATHOLOGY

## 2020-10-29 DIAGNOSIS — Z20.828 CONTACT WITH OR EXPOSURE TO VIRAL DISEASE: ICD-10-CM

## 2020-10-29 PROCEDURE — U0003 INFECTIOUS AGENT DETECTION BY NUCLEIC ACID (DNA OR RNA); SEVERE ACUTE RESPIRATORY SYNDROME CORONAVIRUS 2 (SARS-COV-2) (CORONAVIRUS DISEASE [COVID-19]), AMPLIFIED PROBE TECHNIQUE, MAKING USE OF HIGH THROUGHPUT TECHNOLOGIES AS DESCRIBED BY CMS-2020-01-R: HCPCS | Performed by: NURSE PRACTITIONER

## 2020-10-30 LAB — SARS-COV-2 RNA SPEC QL NAA+PROBE: NOT DETECTED

## 2020-11-25 DIAGNOSIS — Z20.828 EXPOSURE TO SARS-ASSOCIATED CORONAVIRUS: ICD-10-CM

## 2020-11-25 PROCEDURE — U0003 INFECTIOUS AGENT DETECTION BY NUCLEIC ACID (DNA OR RNA); SEVERE ACUTE RESPIRATORY SYNDROME CORONAVIRUS 2 (SARS-COV-2) (CORONAVIRUS DISEASE [COVID-19]), AMPLIFIED PROBE TECHNIQUE, MAKING USE OF HIGH THROUGHPUT TECHNOLOGIES AS DESCRIBED BY CMS-2020-01-R: HCPCS | Performed by: NURSE PRACTITIONER

## 2020-11-27 LAB — SARS-COV-2 RNA SPEC QL NAA+PROBE: NOT DETECTED

## 2020-12-02 DIAGNOSIS — Z20.822 ENCOUNTER FOR SCREENING LABORATORY TESTING FOR COVID-19 VIRUS: ICD-10-CM

## 2020-12-02 PROCEDURE — U0003 INFECTIOUS AGENT DETECTION BY NUCLEIC ACID (DNA OR RNA); SEVERE ACUTE RESPIRATORY SYNDROME CORONAVIRUS 2 (SARS-COV-2) (CORONAVIRUS DISEASE [COVID-19]), AMPLIFIED PROBE TECHNIQUE, MAKING USE OF HIGH THROUGHPUT TECHNOLOGIES AS DESCRIBED BY CMS-2020-01-R: HCPCS | Performed by: NURSE PRACTITIONER

## 2020-12-03 LAB — SARS-COV-2 RNA SPEC QL NAA+PROBE: NOT DETECTED

## 2020-12-22 ENCOUNTER — APPOINTMENT (OUTPATIENT)
Dept: RADIOLOGY | Facility: CLINIC | Age: 60
End: 2020-12-22
Payer: COMMERCIAL

## 2020-12-22 ENCOUNTER — OFFICE VISIT (OUTPATIENT)
Dept: URGENT CARE | Facility: CLINIC | Age: 60
End: 2020-12-22
Payer: COMMERCIAL

## 2020-12-22 VITALS
TEMPERATURE: 97.6 F | HEIGHT: 69 IN | HEART RATE: 66 BPM | DIASTOLIC BLOOD PRESSURE: 70 MMHG | BODY MASS INDEX: 30.07 KG/M2 | OXYGEN SATURATION: 99 % | WEIGHT: 203 LBS | SYSTOLIC BLOOD PRESSURE: 100 MMHG | RESPIRATION RATE: 18 BRPM

## 2020-12-22 DIAGNOSIS — S90.852A FOREIGN BODY OF LEFT HEEL: ICD-10-CM

## 2020-12-22 DIAGNOSIS — T14.8XXA PUNCTURE WOUND: Primary | ICD-10-CM

## 2020-12-22 PROCEDURE — G0382 LEV 3 HOSP TYPE B ED VISIT: HCPCS

## 2020-12-22 PROCEDURE — 73630 X-RAY EXAM OF FOOT: CPT

## 2020-12-22 PROCEDURE — 90715 TDAP VACCINE 7 YRS/> IM: CPT

## 2020-12-22 PROCEDURE — 90471 IMMUNIZATION ADMIN: CPT | Performed by: PHYSICIAN ASSISTANT

## 2021-01-14 ENCOUNTER — IMMUNIZATIONS (OUTPATIENT)
Dept: FAMILY MEDICINE CLINIC | Facility: HOSPITAL | Age: 61
End: 2021-01-14

## 2021-01-14 DIAGNOSIS — Z23 ENCOUNTER FOR IMMUNIZATION: Primary | ICD-10-CM

## 2021-01-14 PROCEDURE — 91301 SARS-COV-2 / COVID-19 MRNA VACCINE (MODERNA) 100 MCG: CPT

## 2021-01-14 PROCEDURE — 0011A SARS-COV-2 / COVID-19 MRNA VACCINE (MODERNA) 100 MCG: CPT

## 2021-02-12 ENCOUNTER — IMMUNIZATIONS (OUTPATIENT)
Dept: FAMILY MEDICINE CLINIC | Facility: HOSPITAL | Age: 61
End: 2021-02-12

## 2021-02-12 DIAGNOSIS — Z23 ENCOUNTER FOR IMMUNIZATION: Primary | ICD-10-CM

## 2021-02-12 PROCEDURE — 91301 SARS-COV-2 / COVID-19 MRNA VACCINE (MODERNA) 100 MCG: CPT

## 2021-02-12 PROCEDURE — 0012A SARS-COV-2 / COVID-19 MRNA VACCINE (MODERNA) 100 MCG: CPT

## 2021-03-10 ENCOUNTER — EVALUATION (OUTPATIENT)
Dept: PHYSICAL THERAPY | Age: 61
End: 2021-03-10
Payer: COMMERCIAL

## 2021-03-10 DIAGNOSIS — M54.2 CERVICALGIA: Primary | ICD-10-CM

## 2021-03-10 PROCEDURE — 97140 MANUAL THERAPY 1/> REGIONS: CPT | Performed by: PHYSICAL THERAPIST

## 2021-03-10 PROCEDURE — 97161 PT EVAL LOW COMPLEX 20 MIN: CPT | Performed by: PHYSICAL THERAPIST

## 2021-03-10 NOTE — PROGRESS NOTES
PT Evaluation     Today's date: 3/10/2021  Patient name: Holli Luna  : 1960  MRN: [id number]  Referring provider: TOMÁS Curtis  Dx:   Encounter Diagnosis     ICD-10-CM    1  Cervicalgia  M54 2        Start Time: 1300  Stop Time: 1355  Total time in clinic (min): 55 minutes    Assessment  Assessment details: Holli Luna is a 62 y o  female who presents with right sided cervical pain, decreased A/PROM most noted in SB and rotation and postural  dysfunction  Due to these impairments, Patient has difficulty performing a/iadls and work-related activities  Patient's clinical presentation is consistent with their referring diagnosis of cervical pain  Patient would benefit from skilled physical therapy to address the aforementioned impairments, improve her level of function and to improve her overall quality of life  Impairments: abnormal or restricted ROM, lacks appropriate home exercise program, pain with function, poor posture  and poor body mechanics  Functional limitations: difficulty working on computer, pain with turning headUnderstanding of Dx/Px/POC: excellent  Goals  ST-3 WEEKS  1  Decrease pain by 2 points on VAS at its worst   2   Increase cervical ROM by > 5 deg in all deficients planes  3   Independent HEP for daily stretching  LT-6 WEEKS  1  Patient able to complete work charting without increased pain  2  Increase functional activities for leisure and home activities to previous LOF  3  Full, pain free AROM of cervical spine      Plan  Patient would benefit from: skilled physical therapy  Planned modality interventions: thermotherapy: hydrocollator packs  Planned therapy interventions: flexibility, functional ROM exercises, home exercise program, joint mobilization, manual therapy, neuromuscular re-education, patient education, postural training, strengthening, stretching, therapeutic activities and therapeutic exercise  Frequency: 2x week  Duration in weeks: 6  Plan of Care beginning date: 3/10/2021  Plan of Care expiration date: 6/10/2021  Treatment plan discussed with: patient        Subjective Evaluation    History of Present Illness  Mechanism of injury: Pt has been having neck pain since awaking one morning several weeks ago  "I slept wrong"  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  Pain  Current pain ratin  At worst pain ratin  Location: right cervical, shoulder  Quality: sharp and tight  Aggravating factors: keyboarding    Hand dominance: right    Treatments  Current treatment: medication  Current treatment comments: musscle relaxer, naproxen  Patient Goals  Patient goals for therapy: decreased pain  Patient goal: be able to complete work charting without increased pain        Objective     Concurrent Complaints      Additional Special Questions  No red flags    Static Posture     Cervical Spine  Tilted right and rotated left  Postural Observations  Seated posture: fair  Standing posture: fair  Correction of posture: has no consistent effect        Palpation     Right   Hypertonic in the cervical paraspinals  Muscle spasm in the levator scapulae  Tenderness of the cervical paraspinals, levator scapulae and upper trapezius  Trigger point to rhomboids and upper trapezius       Tenderness     Additional Tenderness Details  Tender R cervical C4-5-6     Neurological Testing     Additional Neurological Details  No neuro signs    Active Range of Motion   Cervical/Thoracic Spine       Cervical    Flexion: 40 degrees   Extension: 25 degrees      Left lateral flexion: 20 degrees      Right lateral flexion: 10 degrees      Left rotation: 45 degrees  Right rotation: 40 degrees             Strength/Myotome Testing   Cervical Spine     Left   Normal strength    Right   Normal strength      Flowsheet Rows      Most Recent Value   PT/OT G-Codes   Current Score  51   Projected Score  66             Precautions: standard      Manuals 3/10            Cervical STM/PROM/ scap stretch 20'            Thoracic mob 5'                                      TherEx             Upper Trap stretch 5'                                                                                                                                                                                                               Ther Activity                                       Gait Training                                       Modalities                                        HEP- Upper Trap stretch

## 2021-03-10 NOTE — LETTER
March 10, 2022    Domingo Dorado  [address]    Patient: Tiago Reyes   YOB: 1960   Date of Visit: 3/10/2021     Encounter Diagnosis     ICD-10-CM    1  Cervicalgia  M54 2 CANCELED: PT plan of care cert/re-cert       Dear Dr Anirudh Lazo: Thank you for your recent referral of Tiago Reyes  Please review the attached evaluation summary from Abbi's recent visit  Please verify that you agree with the plan of care by signing the attached order  If you have any questions or concerns, please do not hesitate to call  I sincerely appreciate the opportunity to share in the care of one of your patients and hope to have another opportunity to work with you in the near future  Sincerely,    Adrianne Butcher, PT      Referring Provider:      I certify that I have read the below Plan of Care and certify the need for these services furnished under this plan of treatment while under my care  Domingo Dorado  [address] [place] [address]  Via Fax: [phone]          PT Evaluation     Today's date: 3/10/2021  Patient name: Tiago Reyes  : 1960  MRN: [id number]  Referring provider: Corinne Corolla, CRNP  Dx:   Encounter Diagnosis     ICD-10-CM    1  Cervicalgia  M54 2        Start Time: 1300  Stop Time: 1355  Total time in clinic (min): 55 minutes    Assessment  Assessment details: Tiago Reyes is a 62 y o  female who presents with right sided cervical pain, decreased A/PROM most noted in SB and rotation and postural  dysfunction  Due to these impairments, Patient has difficulty performing a/iadls and work-related activities  Patient's clinical presentation is consistent with their referring diagnosis of cervical pain  Patient would benefit from skilled physical therapy to address the aforementioned impairments, improve her level of function and to improve her overall quality of life    Impairments: abnormal or restricted ROM, lacks appropriate home exercise program, pain with function, poor posture  and poor body mechanics  Functional limitations: difficulty working on computer, pain with turning headUnderstanding of Dx/Px/POC: excellent  Goals  ST-3 WEEKS  1  Decrease pain by 2 points on VAS at its worst   2   Increase cervical ROM by > 5 deg in all deficients planes  3   Independent HEP for daily stretching  LT-6 WEEKS  1  Patient able to complete work charting without increased pain  2  Increase functional activities for leisure and home activities to previous LOF  3  Full, pain free AROM of cervical spine  Plan  Patient would benefit from: skilled physical therapy  Planned modality interventions: thermotherapy: hydrocollator packs  Planned therapy interventions: flexibility, functional ROM exercises, home exercise program, joint mobilization, manual therapy, neuromuscular re-education, patient education, postural training, strengthening, stretching, therapeutic activities and therapeutic exercise  Frequency: 2x week  Duration in weeks: 6  Plan of Care beginning date: 3/10/2021  Plan of Care expiration date: 6/10/2021  Treatment plan discussed with: patient        Subjective Evaluation    History of Present Illness  Mechanism of injury: Pt has been having neck pain since awaking one morning several weeks ago  "I slept wrong"  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  Pain  Current pain ratin  At worst pain ratin  Location: right cervical, shoulder  Quality: sharp and tight  Aggravating factors: keyboarding    Hand dominance: right    Treatments  Current treatment: medication  Current treatment comments: musscle relaxer, naproxen       Patient Goals  Patient goals for therapy: decreased pain  Patient goal: be able to complete work charting without increased pain        Objective     Concurrent Complaints      Additional Special Questions  No red flags    Static Posture     Cervical Spine  Tilted right and rotated left  Postural Observations  Seated posture: fair  Standing posture: fair  Correction of posture: has no consistent effect        Palpation     Right   Hypertonic in the cervical paraspinals  Muscle spasm in the levator scapulae  Tenderness of the cervical paraspinals, levator scapulae and upper trapezius  Trigger point to rhomboids and upper trapezius       Tenderness     Additional Tenderness Details  Tender R cervical C4-5-6     Neurological Testing     Additional Neurological Details  No neuro signs    Active Range of Motion   Cervical/Thoracic Spine       Cervical    Flexion: 40 degrees   Extension: 25 degrees      Left lateral flexion: 20 degrees      Right lateral flexion: 10 degrees      Left rotation: 45 degrees  Right rotation: 40 degrees             Strength/Myotome Testing   Cervical Spine     Left   Normal strength    Right   Normal strength      Flowsheet Rows      Most Recent Value   PT/OT G-Codes   Current Score  51   Projected Score  66             Precautions: standard      Manuals 3/10            Cervical STM/PROM/ scap stretch 20'            Thoracic mob 5'                                      TherEx             Upper Trap stretch 5'                                                                                                                                                                                                               Ther Activity                                       Gait Training                                       Modalities                                        HEP- Upper Trap stretch

## 2021-03-11 ENCOUNTER — APPOINTMENT (OUTPATIENT)
Dept: PHYSICAL THERAPY | Age: 61
End: 2021-03-11
Payer: COMMERCIAL

## 2021-03-17 ENCOUNTER — OFFICE VISIT (OUTPATIENT)
Dept: PHYSICAL THERAPY | Age: 61
End: 2021-03-17
Payer: COMMERCIAL

## 2021-03-17 DIAGNOSIS — M54.2 CERVICALGIA: Primary | ICD-10-CM

## 2021-03-17 PROCEDURE — 97110 THERAPEUTIC EXERCISES: CPT | Performed by: PHYSICAL THERAPIST

## 2021-03-17 PROCEDURE — 97140 MANUAL THERAPY 1/> REGIONS: CPT | Performed by: PHYSICAL THERAPIST

## 2021-03-17 NOTE — PROGRESS NOTES
Daily Note     Today's date: 3/17/2021  Patient name: Cassie Berrios  : 1960  MRN: [id number]  Referring provider: TOMÁS Delgado  Dx:   Encounter Diagnosis     ICD-10-CM    1  Cervicalgia  M54 2        Start Time: 1640  Stop Time: 1720  Total time in clinic (min): 40 minutes    Subjective: "A little better"  Pt did note driving in car to [place] caused increased pain  Objective: See treatment diary below      Assessment: Tolerated treatment well  Patient had decreased tightness today, improved cervical AROM with less restriction in rotation  Plan: Continue per plan of care        Precautions: standard      Manuals 3/10 3/17           Cervical STM/PROM/ scap stretch 20' 20'           Thoracic mob 5'                                      TherEx             Upper Trap stretch 5'                         UBE 6' 6'           ROW  30#/30           Posture Row Tband  30x blue                                                                                                                                                          Ther Activity                                       Gait Training                                       Modalities

## 2021-03-19 ENCOUNTER — OFFICE VISIT (OUTPATIENT)
Dept: PHYSICAL THERAPY | Age: 61
End: 2021-03-19
Payer: COMMERCIAL

## 2021-03-19 DIAGNOSIS — M54.2 CERVICALGIA: Primary | ICD-10-CM

## 2021-03-19 PROCEDURE — 97110 THERAPEUTIC EXERCISES: CPT | Performed by: PHYSICAL THERAPIST

## 2021-03-19 PROCEDURE — 97140 MANUAL THERAPY 1/> REGIONS: CPT | Performed by: PHYSICAL THERAPIST

## 2021-03-19 NOTE — PROGRESS NOTES
Daily Note     Today's date: 3/19/2021  Patient name: Nilda Owen  : 1960  MRN: [id number]  Referring provider: TOMÁS Smart  Dx:   Encounter Diagnosis     ICD-10-CM    1  Cervicalgia  M54 2                   Subjective: 4/10 prior to PT  Pt noted Post-session decreased tightness  Objective: See treatment diary below  1:1 with PT 25 min    Assessment: Tolerated treatment well  Patient would benefit from continued PT and may benefit from trial of mechanical traction as pt does get relief with manual distraction  Plan: Continue per plan of care        Precautions: standard      Manuals 3/10 3/17 3/19          Cervical STM/PROM/ scap stretch 20' 20' 20'          Thoracic mob 5'                                      TherEx             Upper Trap stretch 5'  30"x2                       UBE 6' 6' 6'          ROW  30#/30 30#/ 30          Posture Row Tband  30x blue 30x          Chin tucks   20x          Backward rolls   20x                                                                           Mechanical traction   NV Static 16#                                                Ther Activity                                       Gait Training                                       Modalities

## 2021-03-22 ENCOUNTER — APPOINTMENT (OUTPATIENT)
Dept: PHYSICAL THERAPY | Age: 61
End: 2021-03-22
Payer: COMMERCIAL

## 2021-03-24 ENCOUNTER — OFFICE VISIT (OUTPATIENT)
Dept: PHYSICAL THERAPY | Age: 61
End: 2021-03-24
Payer: COMMERCIAL

## 2021-03-24 DIAGNOSIS — M54.2 CERVICALGIA: Primary | ICD-10-CM

## 2021-03-24 PROCEDURE — 97110 THERAPEUTIC EXERCISES: CPT | Performed by: PHYSICAL THERAPIST

## 2021-03-24 PROCEDURE — 97140 MANUAL THERAPY 1/> REGIONS: CPT | Performed by: PHYSICAL THERAPIST

## 2021-03-24 NOTE — PROGRESS NOTES
Daily Note     Today's date: 3/24/2021  Patient name: Mitch Pires  : 1960  MRN: [id number]  Referring provider: TOMÁS Robles  Dx:   Encounter Diagnosis     ICD-10-CM    1  Cervicalgia  M54 2        Start Time:   Stop Time: 7257  Total time in clinic (min): 45 minutes    Subjective: Pt reports she is "definitely having less pain"      Objective: See treatment diary below  Right cervical rotation AROM 50    Assessment: Tolerated treatment well  Patient has decreased tightness  Imprved A/PROm cervical roation  Plan: Continue per plan of care        Precautions: standard      Manuals 3/10 3/17 3/19 3/24         Cervical STM/PROM/ scap stretch 20' 20' 20' 20         Thoracic mob 5'                                      TherEx             Upper Trap stretch 5'  30"x2                       UBE 6' 6' 6' 6'         ROW  30#/30 30#/ 30 30#/ 30         Posture Row Tband  30x blue 30x 30x         Chin tucks   20x 30x         Backward rolls   20x 30x         Posture Row    35#/ 30         Bicep    35#/30         tricep    45#/ 30                                   Mechanical traction   NV                                                 Ther Activity                                       Gait Training                                       Modalities

## 2021-03-26 ENCOUNTER — OFFICE VISIT (OUTPATIENT)
Dept: PHYSICAL THERAPY | Age: 61
End: 2021-03-26
Payer: COMMERCIAL

## 2021-03-26 DIAGNOSIS — M54.2 CERVICALGIA: Primary | ICD-10-CM

## 2021-03-26 PROCEDURE — 97012 MECHANICAL TRACTION THERAPY: CPT

## 2021-03-26 PROCEDURE — 97110 THERAPEUTIC EXERCISES: CPT

## 2021-03-26 PROCEDURE — 97140 MANUAL THERAPY 1/> REGIONS: CPT

## 2021-03-26 NOTE — PROGRESS NOTES
Daily Note     Today's date: 3/26/2021  Patient name: Bienvenido Blackwood  : 1960  MRN: [id number]  Referring provider: TOMÁS Juan Res  Dx:   Encounter Diagnosis     ICD-10-CM    1  Cervicalgia  M54 2        Start Time: 1430  Stop Time: 1526  Total time in clinic (min): 56 minutes    Subjective: Reports 5/10 pain, states she does feel she is improving  Objective: See treatment diary below      Assessment: Tolerated treatment well  Tight t/o cervical region, especially with SB and rotations  TTP R UT, significant tightness and trigger point noted  Introduced traction today set on static with 16 lbs for 10'  Patient felt relief post session  Patient would benefit from continued PT  Plan: Continue per plan of care        Precautions: standard      Manuals 3/10 3/17 3/19 3/24 3/26        Cervical STM/PROM/ scap stretch 20' 20' 20' 20 20'        Thoracic mob 5'                                      TherEx             Upper Trap stretch 5'  30"x2                       UBE 6' 6' 6' 6' 6'         ROW  30#/30 30#/ 30 30#/ 30 30# 30x        Posture Row Tband  30x blue 30x 30x 30x        Chin tucks   20x 30x 30x        Backward rolls   20x 30x 30x        Posture Row    35#/ 30 35# 30x        Bicep    35#/30 35# 30x        tricep    45#/ 30 45# 30x                                  Mechanical traction   NV  Static, 16 lbs, 10'                                               Ther Activity                                       Gait Training                                       Modalities

## 2021-03-29 ENCOUNTER — OFFICE VISIT (OUTPATIENT)
Dept: PHYSICAL THERAPY | Age: 61
End: 2021-03-29
Payer: COMMERCIAL

## 2021-03-29 DIAGNOSIS — M54.2 CERVICALGIA: Primary | ICD-10-CM

## 2021-03-29 PROCEDURE — 97110 THERAPEUTIC EXERCISES: CPT | Performed by: PHYSICAL THERAPIST

## 2021-03-29 PROCEDURE — 97140 MANUAL THERAPY 1/> REGIONS: CPT | Performed by: PHYSICAL THERAPIST

## 2021-03-29 PROCEDURE — 97012 MECHANICAL TRACTION THERAPY: CPT | Performed by: PHYSICAL THERAPIST

## 2021-03-29 NOTE — PROGRESS NOTES
Daily Note     Today's date: 3/29/2021  Patient name: Alison Farah  : 1960  MRN: [id number]  Referring provider: TOMÁS Clemens  Dx:   Encounter Diagnosis     ICD-10-CM    1  Cervicalgia  M54 2                   Subjective: Patient notes some soreness post  traction but then better after the soreness subsided  3-4/10 right cervical worse with cervical rotation  Objective: See treatment diary below      Assessment: Tolerated treatment well  Patient would benefit from continued PT Moderate tightness bilateral trap and scapular tightness, right greater than left  Plan: Continue per plan of care        Precautions: standard      Manuals 3/10 3/17 3/19 3/24 3/26 3/29       Cervical STM/PROM/ scap stretch 20' 20' 20' 20 20' 20       Thoracic mob 5'                                      TherEx             Upper Trap stretch 5'  30"x2                       UBE 6' 6' 6' 6' 6'  6'       ROW  30#/30 30#/ 30 30#/ 30 30# 30x 35/30       Posture Row Tband  30x blue 30x 30x 30x 30x       Chin tucks   20x 30x 30x 30x       Backward rolls   20x 30x 30x 30x       Posture Row    35#/ 30 35# 30x 35/30       Bicep    35#/30 35# 30x 35/30       tricep    45#/ 30 45# 30x 45/30                                 Mechanical traction   NV  Static, 16 lbs, 10' Static  16#  10'                                              Ther Activity                                       Gait Training                                       Modalities

## 2021-03-31 ENCOUNTER — OFFICE VISIT (OUTPATIENT)
Dept: PHYSICAL THERAPY | Age: 61
End: 2021-03-31
Payer: COMMERCIAL

## 2021-03-31 DIAGNOSIS — M54.2 CERVICALGIA: Primary | ICD-10-CM

## 2021-03-31 PROCEDURE — 97140 MANUAL THERAPY 1/> REGIONS: CPT | Performed by: PHYSICAL THERAPIST

## 2021-03-31 PROCEDURE — 97110 THERAPEUTIC EXERCISES: CPT | Performed by: PHYSICAL THERAPIST

## 2021-03-31 NOTE — PROGRESS NOTES
Daily Note     Today's date: 3/31/2021  Patient name: Tiago Reyes  : 1960  MRN: [id number]  Referring provider: Corinne Corolla, CRNP  Dx:   Encounter Diagnosis     ICD-10-CM    1  Cervicalgia  M54 2        Start Time: 131  Stop Time:   Total time in clinic (min): 40 minutes    Subjective: Pt reports she woke up with increased pain this morning "moring is usually worse, then I loosen up as the day goes on"  6/10 with HA      Objective: See treatment diary below      Assessment: Tolerated treatment well  Patient still has pain provoked with right cervical roataion  Continues to have tenderness along right cervical C5-6  Plan: Continue per plan of care        Precautions: standard      Manuals 3/10 3/17 3/19 3/24 3/26 3/29 3/31      Cervical STM/PROM/ scap stretch 20' 20' 20' 20 20' 20 20      Thoracic mob 5'                                      TherEx             Upper Trap stretch 5'  30"x2                       UBE 6' 6' 6' 6' 6'  6' 6'      ROW  30#/30 30#/ 30 30#/ 30 30# 30x 35/30       Posture Row Tband  30x blue 30x 30x 30x 30x       Chin tucks   20x 30x 30x 30x       Backward rolls   20x 30x 30x 30x       Posture Row    35#/ 30 35# 30x 35/30       Bicep    35#/30 35# 30x 35/30       tricep    45#/ 30 45# 30x 45/30                                 Mechanical traction   NV  Static, 16 lbs, 10' Static  16#  10' 18#                                             Ther Activity                                       Gait Training                                       Modalities

## 2021-04-05 ENCOUNTER — OFFICE VISIT (OUTPATIENT)
Dept: URGENT CARE | Facility: CLINIC | Age: 61
End: 2021-04-05
Payer: COMMERCIAL

## 2021-04-05 ENCOUNTER — OFFICE VISIT (OUTPATIENT)
Dept: PHYSICAL THERAPY | Age: 61
End: 2021-04-05
Payer: COMMERCIAL

## 2021-04-05 VITALS
RESPIRATION RATE: 18 BRPM | OXYGEN SATURATION: 99 % | TEMPERATURE: 97.6 F | BODY MASS INDEX: 30.51 KG/M2 | HEART RATE: 64 BPM | HEIGHT: 69 IN | SYSTOLIC BLOOD PRESSURE: 120 MMHG | WEIGHT: 206 LBS | DIASTOLIC BLOOD PRESSURE: 72 MMHG

## 2021-04-05 DIAGNOSIS — M54.2 CERVICALGIA: Primary | ICD-10-CM

## 2021-04-05 DIAGNOSIS — S90.852A ACUTE FOREIGN BODY OF LEFT HEEL, INITIAL ENCOUNTER: Primary | ICD-10-CM

## 2021-04-05 PROCEDURE — 97140 MANUAL THERAPY 1/> REGIONS: CPT

## 2021-04-05 PROCEDURE — 97110 THERAPEUTIC EXERCISES: CPT

## 2021-04-05 PROCEDURE — G0382 LEV 3 HOSP TYPE B ED VISIT: HCPCS

## 2021-04-05 PROCEDURE — 97012 MECHANICAL TRACTION THERAPY: CPT

## 2021-04-05 RX ORDER — CEPHALEXIN 500 MG/1
500 CAPSULE ORAL EVERY 6 HOURS SCHEDULED
Qty: 20 CAPSULE | Refills: 0 | Status: SHIPPED | OUTPATIENT
Start: 2021-04-05 | End: 2021-04-10

## 2021-04-05 NOTE — PROGRESS NOTES
Daily Note     Today's date: 2021  Patient name: Heather Atkins  : 1960  MRN: [id number]  Referring provider: TOMÁS Smith  Dx:   Encounter Diagnosis     ICD-10-CM    1  Cervicalgia  M54 2        Start Time: 1430  Stop Time: 1520  Total time in clinic (min): 50 minutes    Subjective: Patient reports getting better, continues to have pain and stiffness in the am but improves throughout the day  Objective: See treatment diary below      Assessment: Tolerated treatment well, continues to have pain with right cervical rotation and flexion  Notes she feels better after traction  Patient exhibited good technique with therapeutic exercises and would benefit from continued PT      Plan: Continue per plan of care        Precautions: standard      Manuals 3/10 3/17 3/19 3/24 3/26 3/29 3/31 4/5     Cervical STM/PROM/ scap stretch 20' 20' 20' 20 20' 20 20 20'     Thoracic mob 5'                                      TherEx             Upper Trap stretch 5'  30"x2                       UBE 6' 6' 6' 6' 6'  6' 6' 6'     ROW  30#/30 30#/ 30 30#/ 30 30# 30x 35/30  35/30     Posture Row Tband  30x blue 30x 30x 30x 30x  nt     Chin tucks   20x 30x 30x 30x  nt     Backward rolls   20x 30x 30x 30x  30x     Posture Row    35#/ 30 35# 30x 35/30  35/30     Bicep    35#/30 35# 30x 35/30  35/30     tricep    45#/ 30 45# 30x 45/30  45/30                               Mechanical traction   NV  Static, 16 lbs, 10' Static  16#  10' 18# 18#  Static  10'                                            Ther Activity                                       Gait Training                                       Modalities

## 2021-04-05 NOTE — PATIENT INSTRUCTIONS
Puncture Wound in the Foot   AMBULATORY CARE:   A puncture wound  is a hole in the skin of your foot made by a sharp, pointed object  The area may be bruised or swollen  You may have bleeding, pain, or trouble moving the affected area  Signs and symptoms of puncture wounds may include  a bruised or swollen area  You may have bleeding, pain, or trouble moving the affected area  Seek care immediately if:   · You have severe pain  · You have numbness or tingling in the area of your wound  · Your wound starts bleeding and does not stop, even after you apply pressure  Call your doctor if:   · You have new drainage or a bad odor coming from the wound  · You have a fever or chills  · You have increased swelling, redness, or pain  · You have red streaks on your skin coming from your wound  · You have questions or concerns about your condition or care  Medicines: You may need any of the following:  · NSAIDs , such as ibuprofen, help decrease swelling, pain, and fever  This medicine is available with or without a doctor's order  NSAIDs can cause stomach bleeding or kidney problems in certain people  If you take blood thinner medicine, always ask your healthcare provider if NSAIDs are safe for you  Always read the medicine label and follow directions  · Antibiotics  help treat a bacterial infection  · Take your medicine as directed  Contact your healthcare provider if you think your medicine is not helping or if you have side effects  Tell him of her if you are allergic to any medicine  Keep a list of the medicines, vitamins, and herbs you take  Include the amounts, and when and why you take them  Bring the list or the pill bottles to follow-up visits  Carry your medicine list with you in case of an emergency  Care for your wound as directed:  Keep your wound clean and dry  When you are allowed to bathe, carefully wash the wound with soap and water   Dry the area and put on new, clean bandages as directed  Change your bandages when they get wet or dirty  Rest and elevate  your foot above the level of your heart as often as you can  This will help decrease swelling and pain  Prop your foot on pillows or blankets to keep it elevated comfortably  Follow up with your doctor in 2 to 3 days:  Write down your questions so you remember to ask them during your visits  © Copyright [address] Information is for End User's use only and may not be sold, redistributed or otherwise used for commercial purposes  All illustrations and images included in CareNotes® are the copyrighted property of [employer] M [employer]  or [address] [place], [place] [address]nadja   The above information is an  only  It is not intended as medical advice for individual conditions or treatments  Talk to your doctor, nurse or pharmacist before following any medical regimen to see if it is safe and effective for you

## 2021-04-08 NOTE — PROGRESS NOTES
Daily Note     Today's date: 2021  Patient name: Ry Campos  : 1960  MRN: [id number]  Referring provider: TOMÁS Russell  Dx:   Encounter Diagnosis     ICD-10-CM    1  Cervicalgia  M54 2        Start Time: 0700  Stop Time: 6078  Total time in clinic (min): 45 minutes    Subjective: 6/10 right cervical pain upon arrival  Notes 2/10 yesterday  Reports spasms and difficulty turning  Objective: See treatment diary below      Assessment: Tolerated treatment well  Patient would benefit from continued PT Moderate right levator scap and trap tightness with trigger points  Deferred mechanical traction this date per patient request  Posture education for upright posturing and engaging core  Plan: Continue per plan of care        Precautions: standard      Manuals 3/10 3/17 3/19 3/24 3/26 3/29 3/31 4/5 4/9    Cervical STM/PROM/ scap stretch 20' 20' 20' 20 20' 20 20 20' 25    Thoracic mob 5'                                      TherEx             Upper Trap stretch 5'  30"x2                       UBE 6' 6' 6' 6' 6'  6' 6' 6' 6'    ROW  30#/30 30#/ 30 30#/ 30 30# 30x 35/30  35/30 Blue  30x    Posture Row Tband  30x blue 30x 30x 30x 30x  nt     Chin tucks   20x 30x 30x 30x  nt     Backward rolls   20x 30x 30x 30x  30x 30x    Posture Row    35#/ 30 35# 30x 35/30  35/30 35/30    Bicep    35#/30 35# 30x 35/30  35/30 35/30    tricep    45#/ 30 45# 30x 45/30  45/30 45/30                              Mechanical traction   NV  Static, 16 lbs, 10' Static  16#  10' 18# 18#  Static  10' nt                                           Ther Activity                                       Gait Training                                       Modalities

## 2021-04-09 ENCOUNTER — OFFICE VISIT (OUTPATIENT)
Dept: PHYSICAL THERAPY | Age: 61
End: 2021-04-09
Payer: COMMERCIAL

## 2021-04-09 DIAGNOSIS — M54.2 CERVICALGIA: Primary | ICD-10-CM

## 2021-04-09 PROCEDURE — 97140 MANUAL THERAPY 1/> REGIONS: CPT | Performed by: PHYSICAL THERAPIST

## 2021-04-09 PROCEDURE — 97110 THERAPEUTIC EXERCISES: CPT | Performed by: PHYSICAL THERAPIST

## 2021-04-12 ENCOUNTER — EVALUATION (OUTPATIENT)
Dept: PHYSICAL THERAPY | Age: 61
End: 2021-04-12
Payer: COMMERCIAL

## 2021-04-12 DIAGNOSIS — M54.2 CERVICALGIA: Primary | ICD-10-CM

## 2021-04-12 PROCEDURE — 97140 MANUAL THERAPY 1/> REGIONS: CPT

## 2021-04-12 PROCEDURE — 97110 THERAPEUTIC EXERCISES: CPT | Performed by: PHYSICAL THERAPIST

## 2021-04-12 NOTE — LETTER
March 10, 2022    Louiseskyla Turk Domingo Charlotte Harvey  [address]    Patient: Arben Garduno   YOB: 1960   Date of Visit: 2021     Encounter Diagnosis     ICD-10-CM    1  Cervicalgia  M54 2 CANCELED: PT plan of care cert/re-cert       Dear Dr Eliz Laird: Thank you for your recent referral of Arben Garduno  Please review the attached evaluation summary from Abbi's recent visit  Please verify that you agree with the plan of care by signing the attached order  If you have any questions or concerns, please do not hesitate to call  I sincerely appreciate the opportunity to share in the care of one of your patients and hope to have another opportunity to work with you in the near future  Sincerely,    Ryan Hurley, PT      Referring Provider:      I certify that I have read the below Plan of Care and certify the need for these services furnished under this plan of treatment while under my care  Shasta Turk Domingo Harvey  [address] [place] [address]  Via Fax: [phone]          PT Re-Evaluation     Today's date: 2021  Patient name: Arben Garduno  : 1960  MRN: [id number]  Referring provider: TOMÁS King  Dx:   Encounter Diagnosis     ICD-10-CM    1  Cervicalgia  M54 2                   Assessment  Assessment details: Arben Garduno has had 10 sessions of physical therapy to date for acute neck pain  She has decreased pain overall  Her cervical AROM has increased in all planes but she still has an asymmetry present with decreased right cervical rotation and SB   She is more aware of her posture and has modified her sleeping position  At this time, Roxine Meri is making progress towards set goals but may benefit from continued PT to continue to promote pain reduction, establish full cervical ROM and increase postural endurance to prevent reoccurrence    Impairments: abnormal or restricted ROM, lacks appropriate home exercise program, pain with function, poor posture  and poor body mechanics  Functional limitations: difficulty working on computer, pain with turning headUnderstanding of Dx/Px/POC: excellent  Goals  ST-3 WEEKS  1  Decrease pain by 2 points on VAS at its worst  Less "bad days"  2  Increase cervical ROM by > 5 deg in all deficients planes  Achieved  3  Independent HEP for daily stretching  AChieved    LT-6 WEEKS  1  Patient able to complete work charting without increased pain  Working towards, patient can abolish with chin retractions  2  Increase functional activities for leisure and home activities to previous LOF  Working towards   3  Full, pain free AROM of cervical spine  Working towards    Plan  Patient would benefit from: skilled physical therapy  Planned modality interventions: thermotherapy: hydrocollator packs  Planned therapy interventions: flexibility, functional ROM exercises, home exercise program, joint mobilization, manual therapy, neuromuscular re-education, patient education, postural training, strengthening, stretching, therapeutic activities and therapeutic exercise  Frequency: 2x week  Duration in weeks: 5  Plan of Care beginning date: 3/10/2021  Plan of Care expiration date: 6/10/2021  Treatment plan discussed with: patient        Subjective Evaluation    History of Present Illness  Mechanism of injury: Pt has been having neck pain since awaking one morning several weeks ago  "I slept wrong"  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  4/12  Less bad days, able to manage better with HEP  Pt also notes she is moving better  Pain mainly in right trap/scap not down arm    Pain  Current pain ratin  At worst pain ratin  Location: right cervical, shoulder  Quality: sharp and tight  Aggravating factors: keyboarding  Progression: improved    Hand dominance: right    Treatments  Current treatment: medication  Current treatment comments: marco relaxer, naproxen  Patient Goals  Patient goals for therapy: decreased pain  Patient goal: be able to complete work charting without increased pain        Objective     Concurrent Complaints      Additional Special Questions  No red flags    Static Posture     Cervical Spine  Tilted right and rotated left  Postural Observations  Seated posture: fair  Standing posture: fair  Correction of posture: has no consistent effect    Additional Postural Observation Details  Pt is more aware of posture and working on seated posture in car 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    Palpation     Right   Hypertonic in the cervical paraspinals  Muscle spasm in the levator scapulae  Tenderness of the cervical paraspinals, levator scapulae and upper trapezius  Trigger point to rhomboids and upper trapezius       Tenderness     Additional Tenderness Details  Tender R cervical C4-5-6     Neurological Testing     Additional Neurological Details  No neuro signs    Active Range of Motion   Cervical/Thoracic Spine       Cervical    Flexion:  WFL  Extension: 30 (improved from 25) degrees      Left lateral flexion: 40 (improved from 20) degrees      Right lateral flexion: 30 (improved from 10) degrees      Left rotation: 55 (improved from 45) degrees  Right rotation: 50 (improved from 40) degrees             Strength/Myotome Testing   Cervical Spine     Left   Normal strength    Right   Normal strength      Flowsheet Rows      Most Recent Value   PT/OT G-Codes   Current Score  59   Projected Score  66             Precautions: standard      Manuals 3/10 3/17 3/19 3/24 3/26 3/29 3/31 4/5 4/9 4/12   Cervical STM/PROM/ scap stretch 20' 20' 20' 20 20' 20 20 20' 25 25   Thoracic mob 5'                                      TherEx             Upper Trap stretch 5'  30"x2                       UBE 6' 6' 6' 6' 6'  6' 6' 6' 6' 6'   ROW  30#/30 30#/ 30 30#/ 30 30# 30x 35/30  35/30 Blue  30x 35#/ 30x   Posture Row Tband  30x blue 30x 30x 30x 30x nt     Chin tucks   20x 30x 30x 30x  nt  3x10   Retract ext          20x   Retract ext with oscillation          20x   PT assisted chin tucks          5x followed by 2x10 pt   Backward rolls   20x 30x 30x 30x  30x 30x    Posture Row    35#/ 30 35# 30x 35/30  35/30 35/30 35#/ 30   Bicep    35#/30 35# 30x 35/30  35/30 35/30 35#/ 30   tricep    45#/ 30 45# 30x 45/30  45/30 45/30 45#/ 30                             Mechanical traction   NV  Static, 16 lbs, 10' Static  16#  10' 18# 18#  Static  10' nt                 HEP          5'                Ther Activity                                       Gait Training                                       Modalities                                          HEP- chin tucks 10-15 per hour, continue trap stretch as needed  Pt responding well to chin retraction and can reduce symptoms post-retraction

## 2021-04-12 NOTE — PROGRESS NOTES
PT Re-Evaluation     Today's date: 2021  Patient name: Jose Dawson  : 1960  MRN: [id number]  Referring provider: TOMÁS Rojas  Dx:   Encounter Diagnosis     ICD-10-CM    1  Cervicalgia  M54 2                   Assessment  Assessment details: Jose Dawson has had 10 sessions of physical therapy to date for acute neck pain  She has decreased pain overall  Her cervical AROM has increased in all planes but she still has an asymmetry present with decreased right cervical rotation and SB   She is more aware of her posture and has modified her sleeping position  At this time, Pavel Devi is making progress towards set goals but may benefit from continued PT to continue to promote pain reduction, establish full cervical ROM and increase postural endurance to prevent reoccurrence  Impairments: abnormal or restricted ROM, lacks appropriate home exercise program, pain with function, poor posture  and poor body mechanics  Functional limitations: difficulty working on computer, pain with turning headUnderstanding of Dx/Px/POC: excellent  Goals  ST-3 WEEKS  1  Decrease pain by 2 points on VAS at its worst  Less "bad days"  2  Increase cervical ROM by > 5 deg in all deficients planes  Achieved  3  Independent HEP for daily stretching  AChieved    LT-6 WEEKS  1  Patient able to complete work charting without increased pain  Working towards, patient can abolish with chin retractions  2  Increase functional activities for leisure and home activities to previous LOF  Working towards   3  Full, pain free AROM of cervical spine   Working towards    Plan  Patient would benefit from: skilled physical therapy  Planned modality interventions: thermotherapy: hydrocollator packs  Planned therapy interventions: flexibility, functional ROM exercises, home exercise program, joint mobilization, manual therapy, neuromuscular re-education, patient education, postural training, strengthening, stretching, therapeutic activities and therapeutic exercise  Frequency: 2x week  Duration in weeks: 5  Plan of Care beginning date: 3/10/2021  Plan of Care expiration date: 6/10/2021  Treatment plan discussed with: patient        Subjective Evaluation    History of Present Illness  Mechanism of injury: Pt has been having neck pain since awaking one morning several weeks ago  "I slept wrong"  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  /  Less bad days, able to manage better with HEP  Pt also notes she is moving better  Pain mainly in right trap/scap not down arm  Pain  Current pain ratin  At worst pain ratin  Location: right cervical, shoulder  Quality: sharp and tight  Aggravating factors: keyboarding  Progression: improved    Hand dominance: right    Treatments  Current treatment: medication  Current treatment comments: musscle relaxer, naproxen  Patient Goals  Patient goals for therapy: decreased pain  Patient goal: be able to complete work charting without increased pain        Objective     Concurrent Complaints      Additional Special Questions  No red flags    Static Posture     Cervical Spine  Tilted right and rotated left  Postural Observations  Seated posture: fair  Standing posture: fair  Correction of posture: has no consistent effect    Additional Postural Observation Details  Pt is more aware of posture and working on seated posture in car 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    Palpation     Right   Hypertonic in the cervical paraspinals  Muscle spasm in the levator scapulae  Tenderness of the cervical paraspinals, levator scapulae and upper trapezius  Trigger point to rhomboids and upper trapezius       Tenderness     Additional Tenderness Details  Tender R cervical C4-5-6     Neurological Testing     Additional Neurological Details  No neuro signs    Active Range of Motion   Cervical/Thoracic Spine       Cervical    Flexion:  WFL  Extension: 30 (improved from 25) degrees      Left lateral flexion: 40 (improved from 20) degrees      Right lateral flexion: 30 (improved from 10) degrees      Left rotation: 55 (improved from 45) degrees  Right rotation: 50 (improved from 40) degrees             Strength/Myotome Testing   Cervical Spine     Left   Normal strength    Right   Normal strength      Flowsheet Rows      Most Recent Value   PT/OT G-Codes   Current Score  59   Projected Score  66             Precautions: standard      Manuals 3/10 3/17 3/19 3/24 3/26 3/29 3/31 4/5 4/9 4/12   Cervical STM/PROM/ scap stretch 20' 21' 20' 20 20' 20 20 20' 25 25   Thoracic mob 5'                                      TherEx             Upper Trap stretch 5'  30"x2                       UBE 6' 6' 6' 6' 6'  6' 6' 6' 6' 6'   ROW  30#/30 30#/ 30 30#/ 30 30# 30x 35/30  35/30 Blue  30x 35#/ 30x   Posture Row Tband  30x blue 30x 30x 30x 30x  nt     Chin tucks   20x 30x 30x 30x  nt  3x10   Retract ext          20x   Retract ext with oscillation          20x   PT assisted chin tucks          5x followed by 2x10 pt   Backward rolls   20x 30x 30x 30x  30x 30x    Posture Row    35#/ 30 35# 30x 35/30  35/30 35/30 35#/ 30   Bicep    35#/30 35# 30x 35/30  35/30 35/30 35#/ 30   tricep    45#/ 30 45# 30x 45/30  45/30 45/30 45#/ 30                             Mechanical traction   NV  Static, 16 lbs, 10' Static  16#  10' 18# 18#  Static  10' nt                 HEP          5'                Ther Activity                                       Gait Training                                       Modalities                                          HEP- chin tucks 10-15 per hour, continue trap stretch as needed  Pt responding well to chin retraction and can reduce symptoms post-retraction

## 2021-04-16 ENCOUNTER — OFFICE VISIT (OUTPATIENT)
Dept: PHYSICAL THERAPY | Age: 61
End: 2021-04-16
Payer: COMMERCIAL

## 2021-04-16 DIAGNOSIS — M54.2 CERVICALGIA: Primary | ICD-10-CM

## 2021-04-16 PROCEDURE — 97110 THERAPEUTIC EXERCISES: CPT | Performed by: PHYSICAL THERAPIST

## 2021-04-16 PROCEDURE — 97140 MANUAL THERAPY 1/> REGIONS: CPT | Performed by: PHYSICAL THERAPIST

## 2021-04-16 NOTE — PROGRESS NOTES
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 where she was lifting repetitively  She took motrin last night which provided relief  She does report that overall, she is feeling improvement  Objective: See treatment diary below      Assessment: Tolerated treatment well  Patient had good roation and Sb today without increased pain  Improved retraction and extension ROM  Compliant with HEP  Plan: Continue per plan of care        Precautions: standard      Manuals 4/16 3/17 3/19 3/24 3/26 3/29 3/31 4/5 4/9 4/12   Cervical STM/PROM/ scap stretch 20' 20' 20' 20 20' 20 20 20' 25 25   Thoracic mob 5'                                      TherEx             Upper Trap stretch 5'  30"x2                       UBE 6' 6' 6' 6' 6'  6' 6' 6' 6' 6'   ROW 35#/ 30 30#/30 30#/ 30 30#/ 30 30# 30x 35/30  35/30 Blue  30x 35#/ 30x   Posture Row Tband  30x blue 30x 30x 30x 30x  nt     Chin tucks   20x 30x 30x 30x  nt  3x10   Retract ext          20x   Retract ext with oscillation          20x   PT assisted chin tucks          5x followed by 2x10 pt   Backward rolls   20x 30x 30x 30x  30x 30x    Posture Row 35#/ 30   35#/ 30 35# 30x 35/30  35/30 35/30 35#/ 30   Bicep 35#/ 30   35#/30 35# 30x 35/30  35/30 35/30 35#/ 30   tricep 45#/ 30   45#/ 30 45# 30x 45/30  45/30 45/30 45#/ 30                             Mechanical traction   NV  Static, 16 lbs, 10' Static  16#  10' 18# 18#  Static  10' nt                 HEP          5'                Ther Activity                                       Gait Training Modalities                                          HEP- chin tucks 10-15 per hour, continue trap stretch as needed  Pt responding well to chin retraction and can reduce symptoms post-retraction

## 2021-04-19 ENCOUNTER — OFFICE VISIT (OUTPATIENT)
Dept: PHYSICAL THERAPY | Age: 61
End: 2021-04-19
Payer: COMMERCIAL

## 2021-04-19 DIAGNOSIS — M54.2 CERVICALGIA: Primary | ICD-10-CM

## 2021-04-19 PROCEDURE — 97110 THERAPEUTIC EXERCISES: CPT

## 2021-04-19 PROCEDURE — 97140 MANUAL THERAPY 1/> REGIONS: CPT

## 2021-04-19 NOTE — PROGRESS NOTES
Daily Note     Today's date: 2021  Patient name: Kylee Babcock  : 1960  MRN: [id number]  Referring provider: TOMÁS Landaverde  Dx:   Encounter Diagnosis     ICD-10-CM    1  Cervicalgia  M54 2        Start Time: 0800  Stop Time: 0845  Total time in clinic (min): 45 minutes    Subjective: Reports feeling okay today  States she does her chin tucks in the car t/o the day  Objective: See treatment diary below      Assessment: Tolerated treatment well  TTP B UT, especially R with STM  Cues for carryover of exercises and to ensure proper dosage  Patient responding well to cervical retractions with extension  Patient would benefit from continued PT  Plan: Continue per plan of care        Precautions: standard      Manuals 4/16 4/19  3/24 3/26 3/29 3/31 4/5 4/9 4/12   Cervical STM/PROM/ scap stretch 20' 20'  20 20' 20 20 20' 25 25   Thoracic mob 5' 5' MH                                     TherEx             Upper Trap stretch 5'                         UBE 6' 6'  6' 6'  6' 6' 6' 6' 6'   ROW 35#/ 30 35#/30  30#/ 30 30# 30x 35/30  35/30 Blue  30x 35#/ 30x   Posture Row Tband  See below  30x 30x 30x  nt     Chin tucks  3x10  30x 30x 30x  nt  3x10   Retract ext  20x         20x   Retract ext with oscillation  20x        20x   PT assisted chin tucks          5x followed by 2x10 pt   Backward rolls    30x 30x 30x  30x 30x    Posture Row 35#/ 30 35# 30x  35#/ 30 35# 30x 35/30  35/30 35/30 35#/ 30   Bicep 35#/ 30 35# 30x  35#/30 35# 30x 35/30  35/30 35/30 35#/ 30   tricep 45#/ 30 45# 30x  45#/ 30 45# 30x 45/30  45/30 45/30 45#/ 30                             Mechanical traction     Static, 16 lbs, 10' Static  16#  10' 18# 18#  Static  10' nt                 HEP          5'                Ther Activity                                       Gait Training                                       Modalities                                          HEP- chin tucks 10-15 per hour, continue trap stretch as needed  Pt responding well to chin retraction and can reduce symptoms post-retraction

## 2021-04-21 ENCOUNTER — OFFICE VISIT (OUTPATIENT)
Dept: PHYSICAL THERAPY | Age: 61
End: 2021-04-21
Payer: COMMERCIAL

## 2021-04-21 DIAGNOSIS — M54.2 CERVICALGIA: Primary | ICD-10-CM

## 2021-04-21 PROCEDURE — 97110 THERAPEUTIC EXERCISES: CPT | Performed by: PHYSICAL THERAPIST

## 2021-04-21 PROCEDURE — 97140 MANUAL THERAPY 1/> REGIONS: CPT | Performed by: PHYSICAL THERAPIST

## 2021-04-21 NOTE — PROGRESS NOTES
Daily Note     Today's date: 2021  Patient name: Corina Mclain  : 1960  MRN: [id number]  Referring provider: TOMÁS Quintero  Dx:   Encounter Diagnosis     ICD-10-CM    1  Cervicalgia  M54 2        Start Time: 543  Stop Time: 1205  Total time in clinic (min): 40 minutes    Subjective: Decreased pain "definitley improving"  Objective: See treatment diary below      Assessment: Tolerated treatment well  PROM full right cervical rotation today  Increased active extension with less restriction  Plan: Continue per plan of care        Precautions: standard      Manuals 4/16 4/19 4/21 3/24 3/26 3/29 3/31 4/5 4/9 4/12   Cervical STM/PROM/ scap stretch 20' 20' 20' 20 20' 20 20 20' 25 25   Thoracic mob 5' 5' MH                                     TherEx             Upper Trap stretch 5'                         UBE 6' 6' 6' 6' 6'  6' 6' 6' 6' 6'   ROW 35#/ 30 35#/30 35#/ 30 30#/ 30 30# 30x 35/30  35/30 Blue  30x 35#/ 30x   Posture Row Tband  See below  30x 30x 30x  nt     Chin tucks  3x10 30x 30x 30x 30x  nt  3x10   Retract ext  20x         20x   Retract ext with oscillation  20x 20x       20x   PT assisted chin tucks          5x followed by 2x10 pt   Backward rolls    30x 30x 30x  30x 30x    Posture Row 35#/ 30 35# 30x 3#/ 30 35#/ 30 35# 30x 35/30  35/30 35/30 35#/ 30   Bicep 35#/ 30 35# 30x 35#/ 30 35#/30 35# 30x 35/30  35/30 35/30 35#/ 30   tricep 45#/ 30 45# 30x 45#/ 30 45#/ 30 45# 30x 45/30  45/30 45/30 45#/ 30                             Mechanical traction     Static, 16 lbs, 10' Static  16#  10' 18# 18#  Static  10' nt                 HEP          5'                Ther Activity                                       Gait Training                                       Modalities                                          HEP- chin tucks 10-15 per hour, continue trap stretch as needed  Pt responding well to chin retraction and can reduce symptoms post-retraction

## 2021-04-23 ENCOUNTER — APPOINTMENT (OUTPATIENT)
Dept: PHYSICAL THERAPY | Age: 61
End: 2021-04-23
Payer: COMMERCIAL

## 2021-04-26 ENCOUNTER — OFFICE VISIT (OUTPATIENT)
Dept: PHYSICAL THERAPY | Age: 61
End: 2021-04-26
Payer: COMMERCIAL

## 2021-04-26 DIAGNOSIS — M54.2 CERVICALGIA: Primary | ICD-10-CM

## 2021-04-26 PROCEDURE — 97140 MANUAL THERAPY 1/> REGIONS: CPT

## 2021-04-26 PROCEDURE — 97110 THERAPEUTIC EXERCISES: CPT

## 2021-04-26 NOTE — PROGRESS NOTES
Daily Note     Today's date: 2021  Patient name: Stuart Beal  : 1960  MRN: [id number]  Referring provider: TOMÁS Pozo  Dx:   Encounter Diagnosis     ICD-10-CM    1  Cervicalgia  M54 2        Start Time: 0800  Stop Time: 0845  Total time in clinic (min): 45 minutes    Subjective: Reports 3/10 pain the last few days  States she has a hard time finding a comfortable sleeping position  Objective: See treatment diary below      Assessment: Tolerated treatment well  TTP R UT  Limited R side bending noted with PROM  Relief post mobs performed by PT  Discontinued retractions with ext/oscilations secondary to stiffness and discomfort  Patient would benefit from continued PT  Plan: Continue per plan of care        Precautions: standard      Manuals 4/16 4/19 4/21 4/26  3/29 3/31 4/5 4/9 4/12   Cervical STM/PROM/ scap stretch 20' 20' 20' 20'  20 20 20' 25 25   Thoracic mob 5' 5' MH  5' MH                                   TherEx             Upper Trap stretch 5'                         UBE 6' 6' 6' 6'  6' 6' 6' 6' 6'   ROW 35#/ 30 35#/30 35#/ 30 35# 30x  35/30  35/30 Blue  30x 35#/ 30x   Posture Row Tband  See below    30x  nt     Chin tucks  3x10 30x 30x  30x  nt  3x10   Retract ext  20x         20x   Retract ext with oscillation  20x 20x 10x only      20x   PT assisted chin tucks          5x followed by 2x10 pt   Backward rolls      30x  30x 30x    Posture Row 35#/ 30 35# 30x 35#/ 30 35# 30x  35/30  35/30 35/30 35#/ 30   Bicep 35#/ 30 35# 30x 35#/ 30 35# 30x  35/30  35/30 35/30 35#/ 30   tricep 45#/ 30 45# 30x 45#/ 30 45# 30x  45/30  45/30 45/30 45#/ 30                             Mechanical traction      Static  16#  10' 18# 18#  Static  10' nt                 HEP          5'                Ther Activity                                       Gait Training                                       Modalities                                          HEP- chin tucks 10-15 per hour, continue trap stretch as needed  Pt responding well to chin retraction and can reduce symptoms post-retraction

## 2021-04-28 ENCOUNTER — OFFICE VISIT (OUTPATIENT)
Dept: PHYSICAL THERAPY | Age: 61
End: 2021-04-28
Payer: COMMERCIAL

## 2021-04-28 DIAGNOSIS — M54.2 CERVICALGIA: Primary | ICD-10-CM

## 2021-04-28 PROCEDURE — 97110 THERAPEUTIC EXERCISES: CPT

## 2021-04-28 PROCEDURE — 97140 MANUAL THERAPY 1/> REGIONS: CPT

## 2021-04-28 NOTE — PROGRESS NOTES
Daily Note     Today's date: 2021  Patient name: Wicho Dick  : 1960  MRN: [id number]  Referring provider: TOMÁS Hagen  Dx:   Encounter Diagnosis     ICD-10-CM    1  Cervicalgia  M54 2        Start Time: 0800  Stop Time: 0840  Total time in clinic (min): 40 minutes    Subjective: States she was sore for a day or two after her last session  Reports she noticed increased pain after attempting the cervical retractions with extension/oscilations  Today her pain is minimal to none  States she did notice some discomfort when she got in her car and began driving  Objective: See treatment diary below      Assessment: Tolerated treatment well  Minimal TTP with STM today  Relief felt with UT stretch  Good form noted with exercises, some cues for posture and to slow down movements  No pain post session today  Patient would benefit from continued PT  Plan: Continue per plan of care        Precautions: standard      Manuals 4/16 4/19 4/21 4/26 4/28  3/31 4/5 4/9 4/12   Cervical STM/PROM/ scap stretch 20' 20' 20' 20' 20'  20 20' 25 25   Thoracic mob 5' 5' MH  5' MH                                   TherEx             Upper Trap stretch 5'                         UBE 6' 6' 6' 6' 6'  6' 6' 6' 6'   ROW 35#/ 30 35#/30 35#/ 30 35# 30x 35# 30x   35/30 Blue  30x 35#/ 30x   Posture Row Tband  See below      nt     Chin tucks  3x10 30x 30x 30x   nt  3x10   Retract ext  20x         20x   Retract ext with oscillation  20x 20x 10x only NP     20x   PT assisted chin tucks          5x followed by 2x10 pt   Backward rolls        30x 30x    Posture Row 35#/ 30 35# 30x 35#/ 30 35# 30x 35# 30x   35/30 35/30 35#/ 30   Bicep 35#/ 30 35# 30x 35#/ 30 35# 30x 35# 30x   35/30 35/30 35#/ 30   tricep 45#/ 30 45# 30x 45#/ 30 45# 30x 45# 30x    45/30 45/30 45#/ 30                             Mechanical traction       18# 18#  Static  10' nt                 HEP          5'                Ther Activity Gait Training                                       Modalities                                          HEP- chin tucks 10-15 per hour, continue trap stretch as needed  Pt responding well to chin retraction and can reduce symptoms post-retraction

## 2021-05-10 ENCOUNTER — APPOINTMENT (OUTPATIENT)
Dept: RADIOLOGY | Facility: CLINIC | Age: 61
End: 2021-05-10
Payer: COMMERCIAL

## 2021-05-10 ENCOUNTER — OFFICE VISIT (OUTPATIENT)
Dept: URGENT CARE | Facility: CLINIC | Age: 61
End: 2021-05-10
Payer: COMMERCIAL

## 2021-05-10 VITALS
SYSTOLIC BLOOD PRESSURE: 111 MMHG | HEART RATE: 64 BPM | TEMPERATURE: 98 F | WEIGHT: 206 LBS | DIASTOLIC BLOOD PRESSURE: 74 MMHG | RESPIRATION RATE: 20 BRPM | OXYGEN SATURATION: 99 % | BODY MASS INDEX: 30.42 KG/M2

## 2021-05-10 DIAGNOSIS — M25.572 ACUTE LEFT ANKLE PAIN: Primary | ICD-10-CM

## 2021-05-10 DIAGNOSIS — M25.572 ACUTE LEFT ANKLE PAIN: ICD-10-CM

## 2021-05-10 DIAGNOSIS — S82.65XA CLOSED NONDISPLACED FRACTURE OF LATERAL MALLEOLUS OF LEFT FIBULA, INITIAL ENCOUNTER: ICD-10-CM

## 2021-05-10 PROCEDURE — G0382 LEV 3 HOSP TYPE B ED VISIT: HCPCS | Performed by: PHYSICIAN ASSISTANT

## 2021-05-10 PROCEDURE — 73564 X-RAY EXAM KNEE 4 OR MORE: CPT

## 2021-05-10 PROCEDURE — 73610 X-RAY EXAM OF ANKLE: CPT

## 2021-05-10 PROCEDURE — 29515 APPLICATION SHORT LEG SPLINT: CPT | Performed by: PHYSICIAN ASSISTANT
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
## 2021-05-10 NOTE — PATIENT INSTRUCTIONS
Fibula appears fractured   Will follow up with radiology report   Follow up with [facility]   [phone]     Will follow up with radiologist report when available  Recommend elevating body part, icing the area every 2 hours for 20-30 minutes, take Ibuprofen every 6-8 hours to reduce inflammation

## 2021-05-11 ENCOUNTER — TELEPHONE (OUTPATIENT)
Dept: OBGYN CLINIC | Facility: HOSPITAL | Age: 61
End: 2021-05-11

## 2021-05-11 NOTE — TELEPHONE ENCOUNTER
Patient requested to see Dr Johnna Hannon today in [place] for an ankle fx @ 2:30      Patient already has imaging  Please advise if this is not okay to schedule          CB: [phone]

## 2021-05-13 ENCOUNTER — OFFICE VISIT (OUTPATIENT)
Dept: OBGYN CLINIC | Facility: CLINIC | Age: 61
End: 2021-05-13
Payer: COMMERCIAL

## 2021-05-13 ENCOUNTER — APPOINTMENT (OUTPATIENT)
Dept: RADIOLOGY | Facility: CLINIC | Age: 61
End: 2021-05-13
Payer: COMMERCIAL

## 2021-05-13 VITALS — HEIGHT: 69 IN | RESPIRATION RATE: 18 BRPM | BODY MASS INDEX: 30.51 KG/M2 | WEIGHT: 206 LBS

## 2021-05-13 DIAGNOSIS — S82.832A OTHER CLOSED FRACTURE OF DISTAL END OF LEFT FIBULA, INITIAL ENCOUNTER: Primary | ICD-10-CM

## 2021-05-13 DIAGNOSIS — M25.572 ACUTE LEFT ANKLE PAIN: ICD-10-CM

## 2021-05-13 PROCEDURE — 27786 TREATMENT OF ANKLE FRACTURE: CPT | Performed by: ORTHOPAEDIC SURGERY

## 2021-05-13 PROCEDURE — 99203 OFFICE O/P NEW LOW 30 MIN: CPT | Performed by: ORTHOPAEDIC SURGERY

## 2021-05-13 PROCEDURE — 73610 X-RAY EXAM OF ANKLE: CPT

## 2021-05-13 RX ORDER — COVID-19 ANTIGEN TEST
440 KIT MISCELLANEOUS 2 TIMES DAILY PRN
COMMUNITY

## 2021-05-13 RX ORDER — ASPIRIN 81 MG/1
81 TABLET ORAL DAILY
COMMUNITY

## 2021-05-19 ENCOUNTER — APPOINTMENT (OUTPATIENT)
Dept: RADIOLOGY | Facility: CLINIC | Age: 61
End: 2021-05-19
Payer: COMMERCIAL

## 2021-05-19 ENCOUNTER — OFFICE VISIT (OUTPATIENT)
Dept: OBGYN CLINIC | Facility: CLINIC | Age: 61
End: 2021-05-19

## 2021-05-19 VITALS
DIASTOLIC BLOOD PRESSURE: 62 MMHG | WEIGHT: 206 LBS | SYSTOLIC BLOOD PRESSURE: 94 MMHG | HEART RATE: 69 BPM | HEIGHT: 69 IN | BODY MASS INDEX: 30.51 KG/M2 | RESPIRATION RATE: 18 BRPM

## 2021-05-19 DIAGNOSIS — S82.832A OTHER CLOSED FRACTURE OF DISTAL END OF LEFT FIBULA, INITIAL ENCOUNTER: Primary | ICD-10-CM

## 2021-05-19 DIAGNOSIS — S82.832A OTHER CLOSED FRACTURE OF DISTAL END OF LEFT FIBULA, INITIAL ENCOUNTER: ICD-10-CM

## 2021-05-19 PROCEDURE — 73610 X-RAY EXAM OF ANKLE: CPT

## 2021-05-19 PROCEDURE — 99024 POSTOP FOLLOW-UP VISIT: CPT | Performed by: ORTHOPAEDIC SURGERY

## 2021-05-19 NOTE — PROGRESS NOTES
Patient Name:  Aubree Funez  MRN:  [id number]    [address] [place], [place] [address]     1  Other closed fracture of distal end of left fibula, initial encounter  -     XR ankle 3+ vw left; Future; Expected date: 05/19/2021        71-year-old female status post mechanical fall resulting in nondisplaced distal fibular fracture  X-rays reviewed in office today with patient demonstrating maintenance of fracture of left distal fibula  At this time, advised patient to discontinue use of crutch, weight-bearing as tolerated on left lower extremity with use of cam boot for ambulation  Allowed to remove Cam boot for hygienic purposes and gentle range-of-motion exercises of left ankle and toes  Advised patient to continue cam boot for the next 3 weeks, then start weaning from Cam boot afterwards  Patient will follow up in office in approximately 4 weeks for re-evaluation new x-rays upon arrival of left ankle  Advised patient to continue to elevate, ice as needed for pain relief, as well as administration of over-the-counter medications  Work note provided patient to be excused until re-evaluation at follow-up appointment 4 weeks  History of the Present Illness   Aubree Funez is a 62 y o  female Approximately 2 weeks status post mechanical fall resulting in left nondisplaced distal fibular fracture  Last appointment on 05/13/2021, patient was provided with Cam boot and instructed she may removed to perform gentle range-of-motion exercises of left ankle with application of ice as well as elevation to prevent swelling  Today,   The patient reports moderate improvement since initial evaluation left ankle pain  Patient reports she has been elevating and using Cam boot for ambulation  She is only using 1 crutch at this time and weight-bearing as tolerated on left lower extremity  Patient does admit to some discomfort at anterior  And lateral aspects of ankle    She has been performing gentle range-of-motion exercises while resting at home  Denies numbness or tingling left lower extremity; she does admit to occasional tingling in the left foot secondary to Cam boot being applied too tightly  Review of Systems     Review of Systems   Constitutional: Negative for chills and fever  HENT: Negative for congestion  Respiratory: Negative for cough, chest tightness and shortness of breath  Cardiovascular: Negative for chest pain and palpitations  Gastrointestinal: Negative for abdominal pain  Endocrine: Negative for cold intolerance and heat intolerance  Musculoskeletal: Negative for arthralgias, back pain and myalgias  Neurological: Negative for syncope  Psychiatric/Behavioral: Negative for confusion  Physical Exam     BP 94/62   Pulse 69   Resp 18   Ht 5' 9" (1 753 m)   Wt 93 4 kg (206 lb)   BMI 30 42 kg/m²     Left Ankle: Active range of motion to 3 degrees of dorsiflexion and 35 to 40 degrees plantar flexion  There is normal range of motion of toes in plantar flexion and dorsiflexion  There is tenderness present over the fracture site at lateral malleolus  Anterior drawer test is negative  Talar tilt test is  negative  Sensation is intact to light touch superficial peroneal, deep peroneal, tibial, saphenous, and sural nerve distributions  2+ DP pulse present  Data Review     I have personally reviewed pertinent films in PACS, and my interpretation follows  X-rays taken today 05/19/2021 of left ankle demonstrate maintenance of fracture site at the distal fibula, healing noted as compared to previous films  Fracture sight somewheat noticeable on lateral images       Social History     Tobacco Use    Smoking status: Never Smoker    Smokeless tobacco: Never Used   Substance Use Topics    Alcohol use: Not Currently    Drug use: Not Currently           Procedures   None     Gilbert Hernandez PA-C

## 2021-05-19 NOTE — LETTER
May 19, 2021     Patient: Tan Segura   YOB: 1960   Date of Visit: 5/19/2021       To Whom it May Concern:    Tan Segura is under my professional care  She was seen in my office on 5/19/2021  Please excuse Suzy Wilson from work until reevaluation in office with Dr Banda Person in 4 weeks with new x-rays  If you have any questions or concerns, please don't hesitate to call           Sincerely,          Tha Verdugo DO        CC: No Recipients

## 2021-05-21 ENCOUNTER — TELEPHONE (OUTPATIENT)
Dept: OBGYN CLINIC | Facility: HOSPITAL | Age: 61
End: 2021-05-21

## 2021-05-28 ENCOUNTER — TRANSCRIBE ORDERS (OUTPATIENT)
Dept: ADMINISTRATIVE | Facility: HOSPITAL | Age: 61
End: 2021-05-28

## 2021-05-28 ENCOUNTER — APPOINTMENT (OUTPATIENT)
Dept: LAB | Facility: CLINIC | Age: 61
End: 2021-05-28

## 2021-05-28 DIAGNOSIS — Z00.8 HEALTH EXAMINATION IN POPULATION SURVEY: Primary | ICD-10-CM

## 2021-05-28 DIAGNOSIS — R94.6 NONSPECIFIC ABNORMAL RESULTS OF THYROID FUNCTION STUDY: ICD-10-CM

## 2021-05-28 DIAGNOSIS — Z13.6 SCREENING FOR ISCHEMIC HEART DISEASE: ICD-10-CM

## 2021-05-28 DIAGNOSIS — R94.5 NONSPECIFIC ABNORMAL RESULTS OF LIVER FUNCTION STUDY: Primary | ICD-10-CM

## 2021-05-28 DIAGNOSIS — Z00.8 HEALTH EXAMINATION IN POPULATION SURVEY: ICD-10-CM

## 2021-05-28 DIAGNOSIS — R94.5 NONSPECIFIC ABNORMAL RESULTS OF LIVER FUNCTION STUDY: ICD-10-CM

## 2021-05-28 LAB
ALBUMIN SERPL BCP-MCNC: 4 G/DL (ref 3.5–5)
ALP SERPL-CCNC: 84 U/L (ref 46–116)
ALT SERPL W P-5'-P-CCNC: 20 U/L (ref 12–78)
ANION GAP SERPL CALCULATED.3IONS-SCNC: 4 MMOL/L (ref 4–13)
AST SERPL W P-5'-P-CCNC: 10 U/L (ref 5–45)
BASOPHILS # BLD AUTO: 0.09 THOUSANDS/ΜL (ref 0–0.1)
BASOPHILS NFR BLD AUTO: 2 % (ref 0–1)
BILIRUB SERPL-MCNC: 0.99 MG/DL (ref 0.2–1)
BUN SERPL-MCNC: 22 MG/DL (ref 5–25)
CALCIUM SERPL-MCNC: 9.2 MG/DL (ref 8.3–10.1)
CHLORIDE SERPL-SCNC: 108 MMOL/L (ref 100–108)
CHOLEST SERPL-MCNC: 162 MG/DL (ref 50–200)
CO2 SERPL-SCNC: 28 MMOL/L (ref 21–32)
CREAT SERPL-MCNC: 0.64 MG/DL (ref 0.6–1.3)
EOSINOPHIL # BLD AUTO: 0.09 THOUSAND/ΜL (ref 0–0.61)
EOSINOPHIL NFR BLD AUTO: 2 % (ref 0–6)
ERYTHROCYTE [DISTWIDTH] IN BLOOD BY AUTOMATED COUNT: 12.6 % (ref 11.6–15.1)
EST. AVERAGE GLUCOSE BLD GHB EST-MCNC: 111 MG/DL
GFR SERPL CREATININE-BSD FRML MDRD: 98 ML/MIN/1.73SQ M
GLUCOSE P FAST SERPL-MCNC: 92 MG/DL (ref 65–99)
HBA1C MFR BLD: 5.5 %
HCT VFR BLD AUTO: 42.2 % (ref 34.8–46.1)
HDLC SERPL-MCNC: 57 MG/DL
HGB BLD-MCNC: 13.3 G/DL (ref 11.5–15.4)
IMM GRANULOCYTES # BLD AUTO: 0.01 THOUSAND/UL (ref 0–0.2)
IMM GRANULOCYTES NFR BLD AUTO: 0 % (ref 0–2)
LDLC SERPL CALC-MCNC: 96 MG/DL (ref 0–100)
LYMPHOCYTES # BLD AUTO: 1.39 THOUSANDS/ΜL (ref 0.6–4.47)
LYMPHOCYTES NFR BLD AUTO: 30 % (ref 14–44)
MCH RBC QN AUTO: 29.4 PG (ref 26.8–34.3)
MCHC RBC AUTO-ENTMCNC: 31.5 G/DL (ref 31.4–37.4)
MCV RBC AUTO: 93 FL (ref 82–98)
MONOCYTES # BLD AUTO: 0.37 THOUSAND/ΜL (ref 0.17–1.22)
MONOCYTES NFR BLD AUTO: 8 % (ref 4–12)
NEUTROPHILS # BLD AUTO: 2.72 THOUSANDS/ΜL (ref 1.85–7.62)
NEUTS SEG NFR BLD AUTO: 58 % (ref 43–75)
NONHDLC SERPL-MCNC: 105 MG/DL
NRBC BLD AUTO-RTO: 0 /100 WBCS
PLATELET # BLD AUTO: 256 THOUSANDS/UL (ref 149–390)
PMV BLD AUTO: 11.5 FL (ref 8.9–12.7)
POTASSIUM SERPL-SCNC: 4.3 MMOL/L (ref 3.5–5.3)
PROT SERPL-MCNC: 7.7 G/DL (ref 6.4–8.2)
RBC # BLD AUTO: 4.52 MILLION/UL (ref 3.81–5.12)
SODIUM SERPL-SCNC: 140 MMOL/L (ref 136–145)
TRIGL SERPL-MCNC: 43 MG/DL
TSH SERPL DL<=0.05 MIU/L-ACNC: 0.47 UIU/ML (ref 0.36–3.74)
WBC # BLD AUTO: 4.67 THOUSAND/UL (ref 4.31–10.16)

## 2021-05-28 PROCEDURE — 85025 COMPLETE CBC W/AUTO DIFF WBC: CPT

## 2021-05-28 PROCEDURE — 84443 ASSAY THYROID STIM HORMONE: CPT

## 2021-05-28 PROCEDURE — 83036 HEMOGLOBIN GLYCOSYLATED A1C: CPT

## 2021-05-28 PROCEDURE — 80061 LIPID PANEL: CPT

## 2021-05-28 PROCEDURE — 36415 COLL VENOUS BLD VENIPUNCTURE: CPT

## 2021-05-28 PROCEDURE — 80053 COMPREHEN METABOLIC PANEL: CPT

## 2021-06-04 ENCOUNTER — OFFICE VISIT (OUTPATIENT)
Dept: PHYSICAL THERAPY | Age: 61
End: 2021-06-04
Payer: COMMERCIAL

## 2021-06-04 DIAGNOSIS — M54.2 CERVICALGIA: Primary | ICD-10-CM

## 2021-06-04 PROCEDURE — 97140 MANUAL THERAPY 1/> REGIONS: CPT

## 2021-06-04 PROCEDURE — 97110 THERAPEUTIC EXERCISES: CPT

## 2021-06-04 NOTE — PROGRESS NOTES
Daily Note     Today's date: 2021  Patient name: Corina Mclain  : 1960  MRN: [id number]  Referring provider: TOMÁS Quintero  Dx:   Encounter Diagnosis     ICD-10-CM    1  Cervicalgia  M54 2        Start Time: 0700  Stop Time: 0745  Total time in clinic (min): 45 minutes    Subjective: Patient report increased pain in her neck and bl upper traps  Notes she might have slept wrong or needs a new pillow  Objective: See treatment diary below      Assessment: Tolerated treatment well, increased TTP bl upper traps R>L, limited neck rotation and SB, reviewed HEP, added using tennis ball for her upper traps,   Patient exhibited good technique with therapeutic exercises would benefit for continued PT to continue improving prom and strength cervical muscles  Plan: Continue per plan of care        Precautions: standard      Manuals    Cervical STM/PROM/ scap stretch 20' 20' 20' 20' 20' 20' 20 20' 25 25   Thoracic mob 5' 5' MH  5' MH                                   TherEx             Upper Trap stretch 5'                         UBE 6' 6' 6' 6' 6' 6' 6' 6' 6' 6'   ROW 35#/ 30 35#/30 35#/ 30 35# 30x 35# 30x nt  35/30 Blue  30x 35#/ 30x   Posture Row Tband  See below      nt     Chin tucks  3x10 30x 30x 30x 30x  nt  3x10   Retract ext  20x         20x   Retract ext with oscillation  20x 20x 10x only NP     20x   PT assisted chin tucks          5x followed by 2x10 pt   Backward rolls        30x 30x    Posture Row 35#/ 30 35# 30x 35#/ 30 35# 30x 35# 30x nt  35/30 35/30 35#/ 30   Bicep 35#/ 30 35# 30x 35#/ 30 35# 30x 35# 30x nt  35/30 35/30 35#/ 30   tricep 45#/ 30 45# 30x 45#/ 30 45# 30x 45# 30x  nt  45/30 45/30 45#/ 30                             Mechanical traction       18# 18#  Static  10' nt                 HEP      10'    5'                Ther Activity                                       Gait Training Modalities                                          HEP- chin tucks 10-15 per hour, continue trap stretch as needed  Pt responding well to chin retraction and can reduce symptoms post-retraction

## 2021-06-14 ENCOUNTER — OFFICE VISIT (OUTPATIENT)
Dept: PHYSICAL THERAPY | Age: 61
End: 2021-06-14
Payer: COMMERCIAL

## 2021-06-14 DIAGNOSIS — M54.2 CERVICALGIA: Primary | ICD-10-CM

## 2021-06-14 PROCEDURE — 97110 THERAPEUTIC EXERCISES: CPT | Performed by: PHYSICAL THERAPIST

## 2021-06-14 PROCEDURE — 97140 MANUAL THERAPY 1/> REGIONS: CPT | Performed by: PHYSICAL THERAPIST

## 2021-06-14 NOTE — PROGRESS NOTES
Daily Note     Today's date: 2021  Patient name: Yael Richards  : 1960  MRN: [id number]  Referring provider: TOMÁS Leung  Dx:   Encounter Diagnosis     ICD-10-CM    1  Cervicalgia  M54 2        Start Time:   Stop Time:   Total time in clinic (min): 50 minutes    Subjective: Good and bad days  Pt reports she feels good after therapy but then may have a day of severe pain despite not working  Objective: See treatment diary below      Assessment: Tolerated treatment well  Patient able to roate through greater range to right both active with over pressure and passively  Point of restriction with pain occurs at 45/50 right rotation but once beyond, patient can achieve full rotation with no pain  Right traps tightness with large trigger point palpable  Focal tenderness in right cervical area C5-6  Plan: Continue per plan of care  Pt is going to call family physician to request cervical MRI       Precautions: standard      Manuals    Cervical STM/PROM/ scap stretch 20' 20' 20' 20' 20' 20' 20' 20' 25 25   Thoracic mob 5' 5' MH  5' MH                                   TherEx             Upper Trap stretch 5'            Cervical rotation with overpressure       3x      UBE 6' 6' 6' 6' 6' 6' 6' 6' 6' 6'   ROW 35#/ 30 35#/30 35#/ 30 35# 30x 35# 30x nt 35#/ 30 35/30 Blue  30x 35#/ 30x   Posture Row Tband  See below      nt     Chin tucks  3x10 30x 30x 30x 30x 30x nt  3x10   Retract ext  20x         20x   Retract ext with oscillation  20x 20x 10x only NP     20x   PT assisted chin tucks          5x followed by 2x10 pt   Backward rolls       30x 30x 30x    Posture Row 35#/ 30 35# 30x 35#/ 30 35# 30x 35# 30x nt 35#/ 30 35/30 35/30 35#/ 30   Bicep 35#/ 30 35# 30x 35#/ 30 35# 30x 35# 30x nt 35#/ 30 35/30 35/30 35#/ 30   tricep 45#/ 30 45# 30x 45#/ 30 45# 30x 45# 30x  nt 45#/ 30 45/30 45/30 45#/ 30                             Mechanical traction 18#  Static  10' nt                 HEP      10'    5'                Ther Activity                                       Gait Training                                       Modalities                                          HEP- chin tucks 10-15 per hour, continue trap stretch as needed  Pt responding well to chin retraction and can reduce symptoms post-retraction

## 2021-06-15 ENCOUNTER — OFFICE VISIT (OUTPATIENT)
Dept: OBGYN CLINIC | Facility: CLINIC | Age: 61
End: 2021-06-15

## 2021-06-15 ENCOUNTER — APPOINTMENT (OUTPATIENT)
Dept: RADIOLOGY | Facility: CLINIC | Age: 61
End: 2021-06-15
Payer: COMMERCIAL

## 2021-06-15 VITALS
DIASTOLIC BLOOD PRESSURE: 75 MMHG | SYSTOLIC BLOOD PRESSURE: 113 MMHG | RESPIRATION RATE: 18 BRPM | BODY MASS INDEX: 30.21 KG/M2 | HEIGHT: 69 IN | HEART RATE: 61 BPM | WEIGHT: 204 LBS

## 2021-06-15 DIAGNOSIS — S82.822D CLOSED TORUS FRACTURE OF DISTAL END OF LEFT FIBULA WITH ROUTINE HEALING, SUBSEQUENT ENCOUNTER: Primary | ICD-10-CM

## 2021-06-15 DIAGNOSIS — S82.822D CLOSED TORUS FRACTURE OF DISTAL END OF LEFT FIBULA WITH ROUTINE HEALING, SUBSEQUENT ENCOUNTER: ICD-10-CM

## 2021-06-15 PROCEDURE — 73610 X-RAY EXAM OF ANKLE: CPT

## 2021-06-15 PROCEDURE — 99024 POSTOP FOLLOW-UP VISIT: CPT | Performed by: ORTHOPAEDIC SURGERY
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
## 2021-06-15 NOTE — PROGRESS NOTES
Patient Name:  Luna Mckeon  MRN:  [id number]    [address] [place] [address] [place]     1  Closed torus fracture of distal end of left fibula with routine healing, subsequent encounter  -     XR ankle 3+ vw left; Future; Expected date: 06/15/2021        60-year-old female approximately 5 weeks status post left distal fibula fracture  Advised patient to start to wean from Cam boot the next 2 weeks with use of sturdy shoe or sneaker with ambulation throughout her day  Patient may begin physical therapy for range of motion, gait training, proprioception exercises, with modalities as needed such as ice/heat therapy  Work note provided to patient to excuse her until follow-up appointment in 3 weeks for new x-rays will be performed  Patient may continue elevation and ice application  History of the Present Illness   Luna Mckeon is a 62 y o  female approximately 5 weeks status post left distal fibular fracture  At last appointment on 05/19/2021, patient was advised to wean from Cam boot and weightbear as tolerated left lower extremity  Today, patient reports mild-to-moderate improvement of left ankle pain, although admits to mild discomfort at fracture site with touch  Patient has continued use of Cam boot, but no longer uses a crutch for ambulation  Patient still elevates left lower extremity, but does not apply ice or administer over-the-counter oral analgesics  Denies numbness or tingling left lower extremity  Review of Systems     Review of Systems   Constitutional: Negative for chills and fever  HENT: Negative for congestion  Respiratory: Negative for cough, chest tightness and shortness of breath  Cardiovascular: Negative for chest pain and palpitations  Gastrointestinal: Negative for abdominal pain  Endocrine: Negative for cold intolerance and heat intolerance  Musculoskeletal: Negative for arthralgias, back pain and gait problem  Neurological: Negative for syncope  Psychiatric/Behavioral: Negative for confusion  Physical Exam     Resp 18   Ht 5' 9" (1 753 m)   BMI 30 42 kg/m²     Left Ankle: Active range of motion to 3-4 degrees of dorsiflexion and 40 degrees plantar flexion  There is normal range of motion of toes in plantar flexion and dorsiflexion  There is mild tenderness present over the fracture site at distal fibula  Sensation is intact to light touch superficial peroneal, deep peroneal, tibial, saphenous, and sural nerve distributions  2+ DP pulse present  Data Review     I have personally reviewed pertinent films in PACS, and my interpretation follows  X-rays taken today 06/15/2021 of left ankle demonstrate maintenance of fracture of left distal fibula, callus noted as compared to previous films  Fracture line still visible      Social History     Tobacco Use    Smoking status: Never Smoker    Smokeless tobacco: Never Used   Vaping Use    Vaping Use: Never used   Substance Use Topics    Alcohol use: Not Currently    Drug use: Not Currently           Procedures   None    Alayna Johnson PA-C

## 2021-06-15 NOTE — LETTER
Natalie 15, 2021     Patient: Marianne Contreras   YOB: 1960   Date of Visit: 6/15/2021       To Whom it May Concern:    Marianne Contreras is under my professional care  She was seen in my office on 6/15/2021  She is not able to return to work at this time  Will reevaluate at follow up appointment with new x-rays in 3 weeks  If you have any questions or concerns, please don't hesitate to call           Sincerely,          Daksha Husbands, DO        CC: No Recipients

## 2021-06-18 ENCOUNTER — EVALUATION (OUTPATIENT)
Dept: PHYSICAL THERAPY | Age: 61
End: 2021-06-18
Payer: COMMERCIAL

## 2021-06-18 DIAGNOSIS — S82.822D CLOSED TORUS FRACTURE OF DISTAL END OF LEFT FIBULA WITH ROUTINE HEALING, SUBSEQUENT ENCOUNTER: Primary | ICD-10-CM

## 2021-06-18 PROCEDURE — 97161 PT EVAL LOW COMPLEX 20 MIN: CPT | Performed by: PHYSICAL THERAPIST

## 2021-06-18 PROCEDURE — 97110 THERAPEUTIC EXERCISES: CPT | Performed by: PHYSICAL THERAPIST

## 2021-06-21 ENCOUNTER — OFFICE VISIT (OUTPATIENT)
Dept: PHYSICAL THERAPY | Age: 61
End: 2021-06-21
Payer: COMMERCIAL

## 2021-06-21 DIAGNOSIS — S82.822D CLOSED TORUS FRACTURE OF DISTAL END OF LEFT FIBULA WITH ROUTINE HEALING, SUBSEQUENT ENCOUNTER: Primary | ICD-10-CM

## 2021-06-21 DIAGNOSIS — M54.2 CERVICALGIA: ICD-10-CM

## 2021-06-21 PROCEDURE — 97110 THERAPEUTIC EXERCISES: CPT

## 2021-06-21 PROCEDURE — 97140 MANUAL THERAPY 1/> REGIONS: CPT

## 2021-06-21 NOTE — PROGRESS NOTES
Daily Note     Today's date: 2021  Patient name: Aubree Funez  : 1960  MRN: [id number]  Referring provider: TOMÁS Simon  Dx:   Encounter Diagnosis     ICD-10-CM    1  Closed torus fracture of distal end of left fibula with routine healing, subsequent encounter  S82 822D    2  Cervicalgia  M54 2                   Subjective: Patient repots she has been weaning out of CAM boot at home, able to achieve 2 bouts of 11 min  Occasional discomfort across anterior portion of ankle as well as lateral leg where the break occurred  Improved cervical ROM reported by patient since attending PT  Objective: See treatment diary below      Assessment: Patient demonstrates good tolerance to added TE this visit  Decreased strength noted at ankle during T-band exercises, decreased control with BAPS when performing circles CW/CCW  Mild tightness present at ankle during PROM with patient demonstrating good tolerance  Patient offers no complaints post session, would benefit from continued PT to improve R ankle ROM and strength  Plan: Cont per POC  Precautions: Able to wean out of CAM boot, WBAT in sneaker  Per script no running, jogging or jumping at this time           Manuals       L Ankle  JK      Cervical STM/ROM  JK                      Neuro Re-Ed         BOSU Lunges        SLS        Tandem Stance        BAPS Board                                Ther Ex        NuStep L 4 5 mins L4 6 min      HR/TR  20x      ProStretch        Ankle ROM  20x ea      Ankle Circles  20xea      Calf Stretch with towel  :20x4      Ankle TBand  RTB 20x ea      Ankle Isometrics  20x ea                              Ther Activity        Stepups F/L  20x ea      Stepdowns        Gait Training                        Modalities        CP prn

## 2021-06-23 ENCOUNTER — TELEPHONE (OUTPATIENT)
Dept: SURGERY | Facility: HOSPITAL | Age: 61
End: 2021-06-23

## 2021-06-24 ENCOUNTER — ANESTHESIA (OUTPATIENT)
Dept: GASTROENTEROLOGY | Facility: HOSPITAL | Age: 61
End: 2021-06-24

## 2021-06-24 ENCOUNTER — ANESTHESIA EVENT (OUTPATIENT)
Dept: GASTROENTEROLOGY | Facility: HOSPITAL | Age: 61
End: 2021-06-24

## 2021-06-24 ENCOUNTER — HOSPITAL ENCOUNTER (OUTPATIENT)
Dept: GASTROENTEROLOGY | Facility: HOSPITAL | Age: 61
Setting detail: OUTPATIENT SURGERY
Discharge: HOME | End: 2021-06-24
Attending: INTERNAL MEDICINE | Admitting: INTERNAL MEDICINE
Payer: COMMERCIAL

## 2021-06-24 VITALS
WEIGHT: 204 LBS | DIASTOLIC BLOOD PRESSURE: 80 MMHG | HEART RATE: 55 BPM | OXYGEN SATURATION: 99 % | SYSTOLIC BLOOD PRESSURE: 111 MMHG | TEMPERATURE: 96.8 F | HEIGHT: 69 IN | RESPIRATION RATE: 16 BRPM | BODY MASS INDEX: 30.21 KG/M2

## 2021-06-24 DIAGNOSIS — R05.9 COUGH: ICD-10-CM

## 2021-06-24 DIAGNOSIS — R13.10 DYSPHAGIA, UNSPECIFIED: ICD-10-CM

## 2021-06-24 PROCEDURE — 88305 TISSUE EXAM BY PATHOLOGIST: CPT | Performed by: PATHOLOGY

## 2021-06-24 RX ORDER — PROPOFOL 10 MG/ML
INJECTION, EMULSION INTRAVENOUS AS NEEDED
Status: DISCONTINUED | OUTPATIENT
Start: 2021-06-24 | End: 2021-06-24

## 2021-06-24 RX ORDER — KETAMINE HYDROCHLORIDE 50 MG/ML
INJECTION, SOLUTION, CONCENTRATE INTRAMUSCULAR; INTRAVENOUS AS NEEDED
Status: DISCONTINUED | OUTPATIENT
Start: 2021-06-24 | End: 2021-06-24

## 2021-06-24 RX ORDER — SODIUM CHLORIDE, SODIUM LACTATE, POTASSIUM CHLORIDE, CALCIUM CHLORIDE 600; 310; 30; 20 MG/100ML; MG/100ML; MG/100ML; MG/100ML
50 INJECTION, SOLUTION INTRAVENOUS CONTINUOUS
Status: DISCONTINUED | OUTPATIENT
Start: 2021-06-24 | End: 2021-06-28 | Stop reason: HOSPADM

## 2021-06-24 RX ORDER — LIDOCAINE HYDROCHLORIDE 10 MG/ML
0.5 INJECTION, SOLUTION EPIDURAL; INFILTRATION; INTRACAUDAL; PERINEURAL ONCE AS NEEDED
Status: DISCONTINUED | OUTPATIENT
Start: 2021-06-24 | End: 2021-06-28 | Stop reason: HOSPADM

## 2021-06-24 RX ORDER — SODIUM CHLORIDE, SODIUM LACTATE, POTASSIUM CHLORIDE, CALCIUM CHLORIDE 600; 310; 30; 20 MG/100ML; MG/100ML; MG/100ML; MG/100ML
INJECTION, SOLUTION INTRAVENOUS CONTINUOUS PRN
Status: DISCONTINUED | OUTPATIENT
Start: 2021-06-24 | End: 2021-06-24

## 2021-06-24 RX ORDER — LIDOCAINE HYDROCHLORIDE 10 MG/ML
INJECTION, SOLUTION EPIDURAL; INFILTRATION; INTRACAUDAL; PERINEURAL AS NEEDED
Status: DISCONTINUED | OUTPATIENT
Start: 2021-06-24 | End: 2021-06-24

## 2021-06-24 RX ADMIN — PROPOFOL 30 MG: 10 INJECTION, EMULSION INTRAVENOUS at 13:04

## 2021-06-24 RX ADMIN — KETAMINE HYDROCHLORIDE 10 MG: 50 INJECTION, SOLUTION INTRAMUSCULAR; INTRAVENOUS at 13:06

## 2021-06-24 RX ADMIN — KETAMINE HYDROCHLORIDE 20 MG: 50 INJECTION, SOLUTION INTRAMUSCULAR; INTRAVENOUS at 13:03

## 2021-06-24 RX ADMIN — SODIUM CHLORIDE, SODIUM LACTATE, POTASSIUM CHLORIDE, AND CALCIUM CHLORIDE: .6; .31; .03; .02 INJECTION, SOLUTION INTRAVENOUS at 13:02

## 2021-06-24 RX ADMIN — PROPOFOL 120 MG: 10 INJECTION, EMULSION INTRAVENOUS at 13:02

## 2021-06-24 RX ADMIN — PROPOFOL 30 MG: 10 INJECTION, EMULSION INTRAVENOUS at 13:06

## 2021-06-24 RX ADMIN — LIDOCAINE HYDROCHLORIDE 50 MG: 10 INJECTION, SOLUTION EPIDURAL; INFILTRATION; INTRACAUDAL; PERINEURAL at 13:02

## 2021-06-24 RX ADMIN — SODIUM CHLORIDE, SODIUM LACTATE, POTASSIUM CHLORIDE, AND CALCIUM CHLORIDE 50 ML/HR: .6; .31; .03; .02 INJECTION, SOLUTION INTRAVENOUS at 11:04

## 2021-06-24 RX ADMIN — KETAMINE HYDROCHLORIDE 20 MG: 50 INJECTION, SOLUTION INTRAMUSCULAR; INTRAVENOUS at 13:07

## 2021-06-24 RX ADMIN — PROPOFOL 20 MG: 10 INJECTION, EMULSION INTRAVENOUS at 13:08

## 2021-06-24 NOTE — INTERVAL H&P NOTE
H&P reviewed  After examining the patient I find no changes in the patients condition since the H&P had been written      Vitals:    06/24/21 1048   BP: 114/80   Pulse: 58   Resp: 18   Temp: (!) 96 7 °F (35 9 °C)   SpO2: 99%

## 2021-06-24 NOTE — ANESTHESIA PREPROCEDURE EVALUATION
Procedure:  EGD    Relevant Problems   Other   (+) BMI 30 0-30 9,adult        Physical Exam    Airway    Mallampati score: II  TM Distance: >3 FB  Neck ROM: full     Dental   No notable dental hx     Cardiovascular      Pulmonary      Other Findings        Anesthesia Plan  ASA Score- 2     Anesthesia Type- IV sedation with anesthesia with ASA Monitors  Additional Monitors:   Airway Plan:     Comment: I discussed the risks and benefits of IV sedation anesthesia including the possibility of the need to convert to general anesthesia and the potential risk of awareness  Plan Factors-Exercise tolerance (METS): >4 METS  Exercise comment: Able to climb two flights of stairs without cardiopulmonary limitation  Chart reviewed  Existing labs reviewed  Patient summary reviewed  Patient is not a current smoker  Patient did not smoke on day of surgery  Induction- intravenous  Postoperative Plan-     Informed Consent- Anesthetic plan and risks discussed with patient  Yes

## 2021-06-25 ENCOUNTER — OFFICE VISIT (OUTPATIENT)
Dept: PHYSICAL THERAPY | Age: 61
End: 2021-06-25
Payer: COMMERCIAL

## 2021-06-25 DIAGNOSIS — M54.2 CERVICALGIA: ICD-10-CM

## 2021-06-25 DIAGNOSIS — S82.822D CLOSED TORUS FRACTURE OF DISTAL END OF LEFT FIBULA WITH ROUTINE HEALING, SUBSEQUENT ENCOUNTER: Primary | ICD-10-CM

## 2021-06-25 PROCEDURE — 97110 THERAPEUTIC EXERCISES: CPT

## 2021-06-25 PROCEDURE — 97140 MANUAL THERAPY 1/> REGIONS: CPT

## 2021-06-25 NOTE — PROGRESS NOTES
Daily Note     Today's date: 2021  Patient name: Neelam Mars  : 1960  MRN: [id number]  Referring provider: TOMÁS Teixeira  Dx:   Encounter Diagnosis     ICD-10-CM    1  Closed torus fracture of distal end of left fibula with routine healing, subsequent encounter  S82 822D    2  Cervicalgia  M54 2                   Subjective: Patient reports she has been increasing her time out of the CAM boot and is able to achieve bouts of 1 5/2 hours in the sneaker  Discomfort present at anterior portion of the ankle and break site on occasion  Improved cervical discomfort noted the past three days since sleeping with a wedge  Objective: See treatment diary below      Assessment: Patient tolerated treatment well  Weakness present with IV/EV with t-band exercises and isometrics  decreased stability on BAPS board, completed in available range  Mild limitations present in eversion during PROM, however ankle mobility progressing nicely at this time  Cervical tissue tension improvement following manual interventions with improved rotation noted  Consider addition of cervical snags and 3 finger rotation NV  Patient offers no complaints post session, reviewed CAM boot weaning to tolerance  Would benefit from continued PT services to improve LE strength, ankle mobility/strength and maximize overall function  Plan: Cont per POC  Precautions: Able to wean out of CAM boot, WBAT in sneaker  Per script no running, jogging or jumping at this time           Manuals      L Ankle  JK JK     Cervical STM/ROM  JK JK                     Neuro Re-Ed         BOSU Lunges        SLS        Tandem Stance        BAPS Board  20x ea 20x ea                             Ther Ex        NuStep L 4 5 mins L4 6 min L4 7 min     HR/TR  20x 20xea     ProStretch        Ankle ROM  20x ea 20x ea     Ankle Circles  20xea 20x ea     Calf Stretch with towel  :20x4 :20x4     Ankle TBand  RTB 20x ea RTB 20 ea     Ankle Isometrics  20x ea 20x ea                             Ther Activity        Stepups F/L  20x ea 20x ea     Stepdowns        Gait Training                        Modalities        CP prn

## 2021-06-28 ENCOUNTER — OFFICE VISIT (OUTPATIENT)
Dept: PHYSICAL THERAPY | Age: 61
End: 2021-06-28
Payer: COMMERCIAL

## 2021-06-28 DIAGNOSIS — S82.822D CLOSED TORUS FRACTURE OF DISTAL END OF LEFT FIBULA WITH ROUTINE HEALING, SUBSEQUENT ENCOUNTER: Primary | ICD-10-CM

## 2021-06-28 DIAGNOSIS — M54.2 CERVICALGIA: ICD-10-CM

## 2021-06-28 PROCEDURE — 97140 MANUAL THERAPY 1/> REGIONS: CPT | Performed by: PHYSICAL THERAPIST

## 2021-06-28 PROCEDURE — 97110 THERAPEUTIC EXERCISES: CPT | Performed by: PHYSICAL THERAPIST

## 2021-06-28 NOTE — PROGRESS NOTES
Daily Note     Today's date: 2021  Patient name: Makenzie Davis  : 1960  MRN: [id number]  Referring provider: TOMÁS Guardado  Dx:   Encounter Diagnosis     ICD-10-CM    1  Closed torus fracture of distal end of left fibula with routine healing, subsequent encounter  S82 822D    2  Cervicalgia  M54 2        Start Time: 46  Stop Time: 0830  Total time in clinic (min): 45 minutes    Subjective: Pt reports her neck pain is minimal today but continues to fluctuate with activity  She does feel improved overall and feels better since added passive rotation stretch  Ankle is doing well but had to go into CAM boot yesterday after increased activity  Pt admits to being fearful walking down stairs  Objective: See treatment diary below      Assessment: Tolerated treatment well  Patient has calf tightness with mild DF limitations  Added slant board for calf stretch  Plan: Continue per plan of care  Precautions: Able to wean out of CAM boot, WBAT in sneaker  Per script no running, jogging or jumping at this time           Manuals     L Ankle  JK JK 5    Cervical STM/ROM  JK JK 10                    Neuro Re-Ed         BOSU Lunges        SLS    10"x3    Tandem Stance        BAPS Board  20x ea 20x ea 30x ea                            Ther Ex        NuStep L 4 5 mins L4 6 min L4 7 min 10    HR/TR  20x 20xea 30x    ProStretch    30"x4    Ankle ROM  20x ea 20x ea 30x    Ankle Circles  20xea 20x ea 30x    Calf Stretch with towel  :20x4 :20x4 30"x3    Ankle TBand  RTB 20x ea RTB 20 ea 30x    Ankle Isometrics  20x ea 20x ea                             Ther Activity        Stepups F/L  20x ea 20x ea 30x    Stepdowns    15x ea side    Gait Training                        Modalities        CP prn

## 2021-06-30 ENCOUNTER — OFFICE VISIT (OUTPATIENT)
Dept: PHYSICAL THERAPY | Age: 61
End: 2021-06-30
Payer: COMMERCIAL

## 2021-06-30 DIAGNOSIS — S82.822D CLOSED TORUS FRACTURE OF DISTAL END OF LEFT FIBULA WITH ROUTINE HEALING, SUBSEQUENT ENCOUNTER: Primary | ICD-10-CM

## 2021-06-30 DIAGNOSIS — M54.2 CERVICALGIA: ICD-10-CM

## 2021-06-30 PROCEDURE — 97110 THERAPEUTIC EXERCISES: CPT

## 2021-06-30 PROCEDURE — 97140 MANUAL THERAPY 1/> REGIONS: CPT

## 2021-06-30 NOTE — PROGRESS NOTES
Daily Note     Today's date: 2021  Patient name: Pratima Maher  : 1960  MRN: [id number]  Referring provider: TOMÁS Barber  Dx:   Encounter Diagnosis     ICD-10-CM    1  Closed torus fracture of distal end of left fibula with routine healing, subsequent encounter  Y82 822D    2  Cervicalgia  M54 2                   Subjective: Patient noted 2/10 pain when she turns her head  to far to right  Patient noted no pain currently in her ankle  Objective: See treatment diary below      Assessment: Tolerated treatment fair  Patient performed nustep x 10 min  today without complaints  Patient demonstrated good tolerance to exercise dosage and performed exercises with correct form  STM to cervical region helped to decrease fascia restrictions  Patient would benefit from continued PT      Plan: Continue per plan of care  Precautions: Able to wean out of CAM boot, WBAT in sneaker  Per script no running, jogging or jumping at this time           Manuals    L Ankle  JK JK 5 5   Cervical STM/ROM  JK JK 10 10                   Neuro Re-Ed         BOSU Lunges        SLS    10"x3 10"x3   Tandem Stance        BAPS Board  20x ea 20x ea 30x ea 30xea                           Ther Ex        NuStep L 4 5 mins L4 6 min L4 7 min 10 10 L6   HR/TR  20x 20xea 30x 30xea   ProStretch    30"x4    Ankle ROM  20x ea 20x ea 30x 30x   Ankle Circles  20xea 20x ea 30x 30x   Calf Stretch with towel  :20x4 :20x4 30"x3 30"x3    Ankle TBand  RTB 20x ea RTB 20 ea 30x 30 x ea RTB   Ankle Isometrics  20x ea 20x ea                             Ther Activity        Stepups F/L  20x ea 20x ea 30x 30x   Stepdowns    15x ea side 15x ea side   Gait Training                        Modalities        CP prn

## 2021-07-02 ENCOUNTER — HOSPITAL ENCOUNTER (OUTPATIENT)
Dept: MRI IMAGING | Facility: HOSPITAL | Age: 61
Discharge: HOME | End: 2021-07-02
Payer: COMMERCIAL

## 2021-07-02 DIAGNOSIS — M54.2 CERVICALGIA: ICD-10-CM

## 2021-07-02 PROCEDURE — 72141 MRI NECK SPINE W/O DYE: CPT

## 2021-07-02 PROCEDURE — G1004 CDSM NDSC: HCPCS

## 2021-07-06 ENCOUNTER — OFFICE VISIT (OUTPATIENT)
Dept: OBGYN CLINIC | Facility: CLINIC | Age: 61
End: 2021-07-06

## 2021-07-06 ENCOUNTER — APPOINTMENT (OUTPATIENT)
Dept: RADIOLOGY | Facility: CLINIC | Age: 61
End: 2021-07-06
Payer: COMMERCIAL

## 2021-07-06 VITALS
HEIGHT: 69 IN | SYSTOLIC BLOOD PRESSURE: 110 MMHG | DIASTOLIC BLOOD PRESSURE: 73 MMHG | WEIGHT: 203 LBS | BODY MASS INDEX: 30.07 KG/M2 | HEART RATE: 60 BPM | RESPIRATION RATE: 18 BRPM

## 2021-07-06 DIAGNOSIS — S82.822D CLOSED TORUS FRACTURE OF DISTAL END OF LEFT FIBULA WITH ROUTINE HEALING, SUBSEQUENT ENCOUNTER: Primary | ICD-10-CM

## 2021-07-06 DIAGNOSIS — S82.822D CLOSED TORUS FRACTURE OF DISTAL END OF LEFT FIBULA WITH ROUTINE HEALING, SUBSEQUENT ENCOUNTER: ICD-10-CM

## 2021-07-06 PROCEDURE — 99024 POSTOP FOLLOW-UP VISIT: CPT | Performed by: ORTHOPAEDIC SURGERY

## 2021-07-06 PROCEDURE — 73610 X-RAY EXAM OF ANKLE: CPT

## 2021-07-06 NOTE — PROGRESS NOTES
Patient Name:  Bakari Miller  MRN:  [id number]    [address] [place] [address] [place]     1  Closed torus fracture of distal end of left fibula with routine healing, subsequent encounter  -     XR ankle 3+ vw left; Future; Expected date: 07/06/2021         55-year-old female approximately 2 months s/p left distal fibula fracture  X-rays reviewed today in office with patient  Overall, patient continues to improve with physical therapy and transitioning into sturdy sneaker  At this time, patient may continue to increase strengthening, range of motion, and lifting to tolerance while in physical therapy  At this time will continue to remain out of work until 08/01/2021  Patient will follow up in office in approximately 4 weeks for re-evaluation and new x-rays upon arrival     History of the Present Illness   Bakari Miller is a 62 y o  female approximately 2 months s/p left distal fibula fracture  Overall, patient reports she continues to improve in regards to pain and range of motion  Patient reports she has easily transition from the cam boot into a sneaker but continues to admit to mild stiffness  Patient has been continuing physical therapy and started to work aggressively with stair ambulation  Patient wants to start lifting her grandkids and curious about work restrictions at this time  Patient denies numbness and tingling left lower extremity  Review of Systems     Review of Systems   Constitutional: Negative for chills and fever  HENT: Negative for congestion  Respiratory: Negative for cough, chest tightness and shortness of breath  Cardiovascular: Negative for chest pain and palpitations  Gastrointestinal: Negative for abdominal pain  Endocrine: Negative for cold intolerance and heat intolerance  Musculoskeletal: Negative for arthralgias, back pain and gait problem  Neurological: Negative for syncope  Psychiatric/Behavioral: Negative for confusion         Physical Exam     /73 Pulse 60   Resp 18   Ht 5' 9" (1 753 m)   Wt 92 1 kg (203 lb)   BMI 29 98 kg/m²     Left Ankle: Active range of motion to 3 degrees of dorsiflexion and 35 degrees plantar flexion  There is intact range of motion of toes in plantar flexion and dorsiflexion  There is minimal tenderness present over the fracture site at lateral malleolus and peroneal musculature  There is no pain with resisted inversion, eversion, dorsiflexion, plantar flexion  Sensation is intact to light touch superficial peroneal, deep peroneal, tibial, saphenous, and sural nerve distributions  2+ DP pulse present  Data Review     I have personally reviewed pertinent films in PACS, and my interpretation follows  X-rays taken today 07/06/2021 of left ankle demonstrate maintenance of fracture of distal fibula with increased callus formation and healing as compared to previous images  No new fracture dislocation noted  No mortise widening      Social History     Tobacco Use    Smoking status: Never Smoker    Smokeless tobacco: Never Used   Vaping Use    Vaping Use: Never used   Substance Use Topics    Alcohol use: Not Currently    Drug use: Not Currently           Procedures     None    Lan Huff PA-C

## 2021-07-06 NOTE — LETTER
July 6, 2021     Patient: Francisco Cárdenas   YOB: 1960   Date of Visit: 7/6/2021       To Whom it May Concern:    Francisco Cárdenas is under my professional care  She was seen in my office on 7/6/2021  She may return to work on 08/01/2021 without restrictions  If you have any questions or concerns, please don't hesitate to call           Sincerely,          Rafa Charles, DO

## 2021-07-07 ENCOUNTER — OFFICE VISIT (OUTPATIENT)
Dept: PHYSICAL THERAPY | Age: 61
End: 2021-07-07
Payer: COMMERCIAL

## 2021-07-07 DIAGNOSIS — S82.822D CLOSED TORUS FRACTURE OF DISTAL END OF LEFT FIBULA WITH ROUTINE HEALING, SUBSEQUENT ENCOUNTER: Primary | ICD-10-CM

## 2021-07-07 DIAGNOSIS — M54.2 CERVICALGIA: ICD-10-CM

## 2021-07-07 PROCEDURE — 97140 MANUAL THERAPY 1/> REGIONS: CPT | Performed by: PHYSICAL THERAPIST

## 2021-07-07 PROCEDURE — 97110 THERAPEUTIC EXERCISES: CPT | Performed by: PHYSICAL THERAPIST

## 2021-07-07 NOTE — PROGRESS NOTES
Daily Note/Discharge    Today's date: 2021  Patient name: Nova Gustafson  : 1960  MRN: [id number]  Referring provider: TOMÁS Marino  Dx:   Encounter Diagnosis     ICD-10-CM    1  Closed torus fracture of distal end of left fibula with routine healing, subsequent encounter  S82 822D    2  Cervicalgia  M54 2        Start Time: 46  Stop Time: 0830  Total time in clinic (min): 45 minutes    Subjective: Pt reports her neck is doing well very little pain, mostly pain free  Ankle is healed on xray, pt had follow-up with physician, progressed out of Cam, out of work until   Objective: See treatment diary below  Cervical ROM WFL, painfree 90% of time    Assessment: Tolerated treatment well  Patient cervcal ROM WFL and minimal pain with P/AROM  Pt able to manage indepenently with self stretch and HEP  Pt has met all goals related to cervical spine  Plan: Discharge cervical spine treatment to HEP, continue POC for left ankle, progress as toelrated  Precautions: Able to wean out of CAM boot, WBAT in sneaker  Per script no running, jogging or jumping at this time           Manuals    L Ankle 5' JK JK 5 5   Cervical STM/ROM 10' JK JK 10 10                   Neuro Re-Ed         BOSU Lunges        SLS    10"x3 10"x3   Tandem Stance        BAPS Board 30x 20x ea 20x ea 30x ea 30xea                           Ther Ex        NuStep L 4 5 mins L4 6 min L4 7 min 10 10 L6   HR/TR  20x 20xea 30x 30xea   ProStretch    30"x4    Ankle ROM  20x ea 20x ea 30x 30x   Ankle Circles  20xea 20x ea 30x 30x   Calf Stretch with towel  :20x4 :20x4 30"x3 30"x3    Ankle TBand  RTB 20x ea RTB 20 ea 30x 30 x ea RTB   Ankle Isometrics Isolator 3# 20x ea 20x ea                             Ther Activity        Stepups F/L  20x ea 20x ea 30x 30x   Stepdowns    15x ea side 15x ea side   Gait Training                        Modalities        CP prn

## 2021-07-08 ENCOUNTER — APPOINTMENT (OUTPATIENT)
Dept: PHYSICAL THERAPY | Age: 61
End: 2021-07-08
Payer: COMMERCIAL

## 2021-07-09 ENCOUNTER — APPOINTMENT (OUTPATIENT)
Dept: PHYSICAL THERAPY | Age: 61
End: 2021-07-09
Payer: COMMERCIAL

## 2021-07-12 ENCOUNTER — OFFICE VISIT (OUTPATIENT)
Dept: PHYSICAL THERAPY | Age: 61
End: 2021-07-12
Payer: COMMERCIAL

## 2021-07-12 DIAGNOSIS — S82.822D CLOSED TORUS FRACTURE OF DISTAL END OF LEFT FIBULA WITH ROUTINE HEALING, SUBSEQUENT ENCOUNTER: Primary | ICD-10-CM

## 2021-07-12 PROCEDURE — 97110 THERAPEUTIC EXERCISES: CPT

## 2021-07-12 NOTE — PROGRESS NOTES
Daily Note     Today's date: 2021  Patient name: Nova Gustafson  : 1960  MRN: [id number]  Referring provider: TOMÁS Marino  Dx:   Encounter Diagnosis     ICD-10-CM    1  Closed torus fracture of distal end of left fibula with routine healing, subsequent encounter  S82 822D        Start Time: 08  Stop Time: 930  Total time in clinic (min): 45 minutes    Subjective: Patient reports no c/o pain just stiffness  Objective: See treatment diary below      Assessment: Tolerated treatment well, progress treatment as tolerated, advised patient to wear sneakers next visit due to she wore sandals today  Some tenderness lateral malleolus  Patient exhibited good technique with therapeutic exercises and would benefit from continued PT      Plan: Continue per plan of care  Precautions: Able to wean out of CAM boot, WBAT in sneaker  Per script no running, jogging or jumping at this time             Manuals    L Ankle 5' 7' JK 5 5   Cervical STM/ROM 10' D/C JK 10 10                   Neuro Re-Ed         BOSU Lunges        SLS    10"x3 10"x3   Tandem Stance        BAPS Board 30x 20x ea 20x ea 30x ea 30xea                           Ther Ex        NuStep L 4 5 mins L5 10' L4 7 min 10 10 L6   HR/TR  30x 20xea 30x 30xea   ProStretch  10"x10  30"x4    slant  30"x4 L3      Ankle ROM  30x 20x ea 30x 30x   Ankle Circles  30x ea 20x ea 30x 30x   Calf Stretch with towel   :20x4 30"x3 30"x3    Ankle TBand   RTB 20 ea 30x 30 x ea RTB   Ankle Isometrics Isolator 3# 3#/30x ea 20x ea                             Ther Activity        Stepups F/L  30x ea 20x ea 30x 30x   Stepdowns  20x  15x ea side 15x ea side   Gait Training                        Modalities        CP prn

## 2021-07-14 ENCOUNTER — TELEPHONE (OUTPATIENT)
Dept: SURGERY | Facility: HOSPITAL | Age: 61
End: 2021-07-14

## 2021-07-14 ENCOUNTER — OFFICE VISIT (OUTPATIENT)
Dept: PHYSICAL THERAPY | Age: 61
End: 2021-07-14
Payer: COMMERCIAL

## 2021-07-14 DIAGNOSIS — S82.822D CLOSED TORUS FRACTURE OF DISTAL END OF LEFT FIBULA WITH ROUTINE HEALING, SUBSEQUENT ENCOUNTER: Primary | ICD-10-CM

## 2021-07-14 PROCEDURE — 97112 NEUROMUSCULAR REEDUCATION: CPT | Performed by: PHYSICAL THERAPIST

## 2021-07-14 PROCEDURE — 97110 THERAPEUTIC EXERCISES: CPT | Performed by: PHYSICAL THERAPIST

## 2021-07-14 PROCEDURE — 97140 MANUAL THERAPY 1/> REGIONS: CPT | Performed by: PHYSICAL THERAPIST

## 2021-07-14 NOTE — PROGRESS NOTES
Daily Note     Today's date: 2021  Patient name: Gema Solorio  : 1960  MRN: [id number]  Referring provider: TOMÁS Preciado  Dx:   Encounter Diagnosis     ICD-10-CM    1  Closed torus fracture of distal end of left fibula with routine healing, subsequent encounter  S82 822D        Start Time: 0745  Stop Time: 0845  Total time in clinic (min): 60 minutes    Subjective: C/o stiffness in ankle      Objective: See treatment diary below      Assessment: Tolerated treatment well  Added balance challenges on pods and foam for NM re-ed/proprioception  Plan: Continue per plan of care  Precautions: Able to wean out of CAM boot, WBAT in sneaker  Per script no running, jogging or jumping at this time             Manuals  630   L Ankle 5' 7' 8 5 5   Cervical STM/ROM 10' D/C  10 10                   Neuro Re-Ed         BOSU Lunges        SLS    10"x3 10"x3   Tandem Stance        BAPS Board 30x 20x ea 20x ea 30x ea 30xea   Foam SLS   3x10"     Pods romberg  S  romberg   2x ea 30"     Pod walk   6x     Ther Ex        NuStep L 4 5 mins L5 10' L4 7 min 10 10 L6   HR/TR  30x 30x 30x 30xea   ProStretch  10"x10 10"x10 30"x4    slant  30"x4 L3 30"x4     Ankle ROM  30x 30x ea 30x 30x   Ankle Circles  30x ea 30x ea 30x 30x   Calf Stretch with towel   30"x3 30"x3 30"x3    Ankle TBand    30x 30 x ea RTB   Ankle Isolator Isolator 3# 3#/30x ea 3#/30x ea     Calf press   30#/30     Leg press   75#/30             Ther Activity  "      Stepups F/L  30x ea 30x ea 30x 30x   Stepdowns  20x 30x 15x ea side 15x ea side   Gait Training                        Modalities        CP prn

## 2021-07-15 ENCOUNTER — HOSPITAL ENCOUNTER (OUTPATIENT)
Dept: GASTROENTEROLOGY | Facility: HOSPITAL | Age: 61
Setting detail: OUTPATIENT SURGERY
Discharge: HOME | End: 2021-07-15
Attending: INTERNAL MEDICINE
Payer: COMMERCIAL

## 2021-07-15 ENCOUNTER — ANESTHESIA EVENT (OUTPATIENT)
Dept: GASTROENTEROLOGY | Facility: HOSPITAL | Age: 61
End: 2021-07-15

## 2021-07-15 ENCOUNTER — ANESTHESIA (OUTPATIENT)
Dept: GASTROENTEROLOGY | Facility: HOSPITAL | Age: 61
End: 2021-07-15

## 2021-07-15 VITALS
OXYGEN SATURATION: 94 % | HEIGHT: 69 IN | HEART RATE: 64 BPM | SYSTOLIC BLOOD PRESSURE: 113 MMHG | BODY MASS INDEX: 29.77 KG/M2 | DIASTOLIC BLOOD PRESSURE: 66 MMHG | RESPIRATION RATE: 18 BRPM | TEMPERATURE: 97.3 F | WEIGHT: 201 LBS

## 2021-07-15 DIAGNOSIS — Z12.11 ENCOUNTER FOR SCREENING FOR MALIGNANT NEOPLASM OF COLON: ICD-10-CM

## 2021-07-15 RX ORDER — LIDOCAINE HYDROCHLORIDE 10 MG/ML
0.5 INJECTION, SOLUTION EPIDURAL; INFILTRATION; INTRACAUDAL; PERINEURAL ONCE AS NEEDED
Status: DISCONTINUED | OUTPATIENT
Start: 2021-07-15 | End: 2021-07-19 | Stop reason: HOSPADM

## 2021-07-15 RX ORDER — PROPOFOL 10 MG/ML
INJECTION, EMULSION INTRAVENOUS AS NEEDED
Status: DISCONTINUED | OUTPATIENT
Start: 2021-07-15 | End: 2021-07-15

## 2021-07-15 RX ORDER — SODIUM CHLORIDE, SODIUM LACTATE, POTASSIUM CHLORIDE, CALCIUM CHLORIDE 600; 310; 30; 20 MG/100ML; MG/100ML; MG/100ML; MG/100ML
50 INJECTION, SOLUTION INTRAVENOUS CONTINUOUS
Status: DISCONTINUED | OUTPATIENT
Start: 2021-07-15 | End: 2021-07-19 | Stop reason: HOSPADM

## 2021-07-15 RX ORDER — PROPOFOL 10 MG/ML
INJECTION, EMULSION INTRAVENOUS CONTINUOUS PRN
Status: DISCONTINUED | OUTPATIENT
Start: 2021-07-15 | End: 2021-07-15

## 2021-07-15 RX ORDER — LIDOCAINE HYDROCHLORIDE 20 MG/ML
INJECTION, SOLUTION EPIDURAL; INFILTRATION; INTRACAUDAL; PERINEURAL AS NEEDED
Status: DISCONTINUED | OUTPATIENT
Start: 2021-07-15 | End: 2021-07-15

## 2021-07-15 RX ADMIN — PROPOFOL 100 MG: 10 INJECTION, EMULSION INTRAVENOUS at 08:31

## 2021-07-15 RX ADMIN — LIDOCAINE HYDROCHLORIDE 50 MG: 20 INJECTION, SOLUTION EPIDURAL; INFILTRATION; INTRACAUDAL; PERINEURAL at 08:31

## 2021-07-15 RX ADMIN — SODIUM CHLORIDE, SODIUM LACTATE, POTASSIUM CHLORIDE, AND CALCIUM CHLORIDE 50 ML/HR: .6; .31; .03; .02 INJECTION, SOLUTION INTRAVENOUS at 07:42

## 2021-07-15 RX ADMIN — PROPOFOL 130 MCG/KG/MIN: 10 INJECTION, EMULSION INTRAVENOUS at 08:31

## 2021-07-15 NOTE — ANESTHESIA POSTPROCEDURE EVALUATION
Post-Op Assessment Note    CV Status:  Stable  Pain Score: 0    Pain management: adequate     Mental Status:  Arousable   Hydration Status:  Stable   PONV Controlled:  None   Airway Patency:  Patent      Post Op Vitals Reviewed: Yes      Staff: CRNA         No complications documented      BP  99/59    Temp     Pulse  60   Resp   15   SpO2   98

## 2021-07-15 NOTE — ANESTHESIA PREPROCEDURE EVALUATION
Procedure:  COLONOSCOPY    Relevant Problems   Other   (+) BMI 30 0-30 9,adult        Physical Exam    Airway    Mallampati score: II  TM Distance: >3 FB  Neck ROM: full     Dental   No notable dental hx     Cardiovascular      Pulmonary      Other Findings        Anesthesia Plan  ASA Score- 2     Anesthesia Type- IV sedation with anesthesia with ASA Monitors  Additional Monitors:   Airway Plan:     Comment: I discussed the risks and benefits of IV sedation anesthesia including the possibility of the need to convert to general anesthesia and the potential risk of awareness  Plan Factors-Exercise tolerance (METS): >4 METS  Exercise comment: Able to climb two flights of stairs without cardiopulmonary limitation  Chart reviewed  Existing labs reviewed  Patient summary reviewed  Patient is not a current smoker  Patient did not smoke on day of surgery  Induction- intravenous  Postoperative Plan-     Informed Consent- Anesthetic plan and risks discussed with patient

## 2021-07-15 NOTE — INTERVAL H&P NOTE
H&P reviewed  After examining the patient I find no changes in the patients condition since the H&P had been written      Vitals:    07/15/21 0735   BP: 122/69   Pulse: 61   Resp: 18   Temp: (!) 97 °F (36 1 °C)   SpO2: 100%

## 2021-07-15 NOTE — DISCHARGE INSTRUCTIONS
Upper Endoscopy   WHAT YOU NEED TO KNOW:   An upper endoscopy is also called an upper gastrointestinal (GI) endoscopy, or an esophagogastroduodenoscopy (EGD)  It is a procedure to examine the inside of your esophagus, stomach, and duodenum (first part of the small intestine) with a scope  You may feel bloated, gassy, or have some abdominal discomfort after your procedure  Your throat may be sore for 24 to 36 hours  You may burp or pass gas from air that is still inside your body  DISCHARGE INSTRUCTIONS:   Seek care immediately if:    You have sudden, severe abdominal pain   You have problems swallowing   You have a large amount of black, sticky bowel movements or blood in your bowel movements   You have sudden trouble breathing   You feel weak, lightheaded, or faint or your heart beats faster than normal for you  Contact your healthcare provider if:    You have a fever and chills   You have nausea or are vomiting   Your abdomen is bloated or feels full and hard   You have abdominal pain   You have black, sticky bowel movements or blood in your bowel movements   You have not had a bowel movement for 3 days after your procedure   You have rash or hives   You have questions or concerns about your procedure  Activity:    Do not lift, strain, or run for 24 hours after your procedure   Rest after your procedure  You have been given medicine to relax you  Do not drive or make important decisions until the day after your procedure  Return to your normal activity as directed   Relieve gas and discomfort from bloating by lying on your right side with a heating pad on your abdomen  You may need to take short walks to help the gas move out  Eat small meals until bloating is relieved  Follow up with your healthcare provider as directed: Write down your questions so you remember to ask them during your visits       If you take a blood thinner, please review the specific instructions from your endoscopist about when you should resume it  These can be found in the Recommendation and Your Medication list sections of this After Visit Summary  Diet for Stomach Ulcers and Gastritis   WHAT YOU NEED TO KNOW:   What is a diet for stomach ulcers and gastritis? A diet for ulcers and gastritis is a meal plan that limits foods that irritate your stomach  Certain foods may worsen symptoms such as stomach pain, bloating, heartburn, or indigestion  Which foods should I limit or avoid? You may need to avoid acidic, spicy, or high-fat foods  Not all foods affect everyone the same way  You will need to learn which foods worsen your symptoms and limit those foods  The following are some foods that may worsen ulcer or gastritis symptoms:  · Beverages:      ? Whole milk and chocolate milk    ? Hot cocoa and cola    ? Any beverage with caffeine    ? Regular and decaffeinated coffee    ? Peppermint and spearmint tea    ? Green and black tea, with or without caffeine    ? Orange and grapefruit juices    ? Drinks that contain alcohol    · Spices and seasonings:      ? Black and red pepper    ? Chili powder    ? Mustard seed and nutmeg    · Other foods:      ? Dairy foods made from whole milk or cream    ? Chocolate    ? Spicy or strongly flavored cheeses, such as jalapeno or black pepper    ? Highly seasoned, high-fat meats, such as sausage, salami, stoddard, ham, and cold cuts    ? Hot chiles and peppers    ? Tomato products, such as tomato paste, tomato sauce, or tomato juice    Which foods can I eat and drink? Eat a variety of healthy foods from all the food groups  Eat fruits, vegetables, whole grains, and fat-free or low-fat dairy foods  Whole grains include whole-wheat breads, cereals, pasta, and brown rice  Choose lean meats, poultry (chicken and turkey), fish, beans, eggs, and nuts  A healthy meal plan is low in unhealthy fats, salt, and added sugar   Healthy fats include olive oil and canola oil  Ask your dietitian for more information about a healthy meal plan  What other guidelines may be helpful? · Do not eat right before bedtime  Stop eating at least 2 hours before bedtime  · Eat small, frequent meals  Your stomach may tolerate small, frequent meals better than large meals  CARE AGREEMENT:   You have the right to help plan your care  Discuss treatment options with your healthcare provider to decide what care you want to receive  You always have the right to refuse treatment  The above information is an  only  It is not intended as medical advice for individual conditions or treatments  Talk to your doctor, nurse or pharmacist before following any medical regimen to see if it is safe and effective for you  © Copyright [address] Information is for End User's use only and may not be sold, redistributed or otherwise used for commercial purposes   All illustrations and images included in CareNotes® are the copyrighted property of A D [employer] M [employer]  or [address] [place] [address]

## 2021-07-19 ENCOUNTER — APPOINTMENT (OUTPATIENT)
Dept: PHYSICAL THERAPY | Age: 61
End: 2021-07-19
Payer: COMMERCIAL

## 2021-07-20 ENCOUNTER — OFFICE VISIT (OUTPATIENT)
Dept: PHYSICAL THERAPY | Age: 61
End: 2021-07-20
Payer: COMMERCIAL

## 2021-07-20 DIAGNOSIS — S82.822D CLOSED TORUS FRACTURE OF DISTAL END OF LEFT FIBULA WITH ROUTINE HEALING, SUBSEQUENT ENCOUNTER: Primary | ICD-10-CM

## 2021-07-20 PROCEDURE — 97110 THERAPEUTIC EXERCISES: CPT | Performed by: PHYSICAL THERAPIST

## 2021-07-20 PROCEDURE — 97112 NEUROMUSCULAR REEDUCATION: CPT | Performed by: PHYSICAL THERAPIST

## 2021-07-20 NOTE — PROGRESS NOTES
Daily Note     Today's date: 2021  Patient name: Mandy Barahona  : 1960  MRN: [id number]  Referring provider: TOMÁS Mcqueen  Dx:   Encounter Diagnosis     ICD-10-CM    1  Closed torus fracture of distal end of left fibula with routine healing, subsequent encounter  S82 822D        Start Time: 0800  Stop Time: 4210  Total time in clinic (min): 50 minutes    Subjective: Pt walking outside on grass and noted slight discomfort along medial ankle  Pt to RTW in 2 weeks  Objective: See treatment diary below      Assessment: Tolerated treatment well  Patient now has normalized gait  Antalgia abolished  Challenged with pods compliant surface balance activities   Plan: Continue per plan of care  Precautions: Able to wean out of CAM boot, WBAT in sneaker  Per script no running, jogging or jumping at this time             Manuals  6/30   L Ankle 5' 7' 8 5 5   Cervical STM/ROM 10' D/C  10 10                   Neuro Re-Ed         BOSU Lunges        SLS    10"x3 10"x3   Tandem Stance        BAPS Board 30x 20x ea 20x ea 30x ea 30xea   Foam SLS   3x10"     Pods romberg  S  romberg   2x ea 30"     Pod walk   6x 4x ea condition    Ther Ex        NuStep L 4 5 mins L5 10' L4 7 min 10 10 L6   HR/TR  30x 30x 30x 30xea   ProStretch  10"x10 10"x10 10"x10    slant  30"x4 L3 30"x4 30"x4    Ankle ROM  30x 30x ea  30x   Ankle Circles  30x ea 30x ea  30x   Calf Stretch with towel   30"x3 30"x3 30"x3    Ankle TBand    30x 30 x ea RTB   Ankle Isolator Isolator 3# 3#/30x ea 3#/30x ea 3#/ 30    Calf press   30#/30 30#/ 30    Leg press   75#/30 75#/ 30            Ther Activity  "      Stepups F/L  30x ea 30x ea 30x 30x   Stepdowns  20x 30x 30x ea side 15x ea side   Gait Training                        Modalities        CP prn

## 2021-07-21 ENCOUNTER — APPOINTMENT (OUTPATIENT)
Dept: PHYSICAL THERAPY | Age: 61
End: 2021-07-21
Payer: COMMERCIAL

## 2021-07-23 ENCOUNTER — OFFICE VISIT (OUTPATIENT)
Dept: PHYSICAL THERAPY | Age: 61
End: 2021-07-23
Payer: COMMERCIAL

## 2021-07-23 DIAGNOSIS — M54.2 CERVICALGIA: ICD-10-CM

## 2021-07-23 DIAGNOSIS — S82.822D CLOSED TORUS FRACTURE OF DISTAL END OF LEFT FIBULA WITH ROUTINE HEALING, SUBSEQUENT ENCOUNTER: Primary | ICD-10-CM

## 2021-07-23 PROCEDURE — 97110 THERAPEUTIC EXERCISES: CPT

## 2021-07-23 PROCEDURE — 97112 NEUROMUSCULAR REEDUCATION: CPT

## 2021-07-23 NOTE — PROGRESS NOTES
Daily Note     Today's date: 2021  Patient name: Anu Stovall  : 1960  MRN: [id number]  Referring provider: TOMÁS Mcclure  Dx:   Encounter Diagnosis     ICD-10-CM    1  Closed torus fracture of distal end of left fibula with routine healing, subsequent encounter  S82 822D    2  Cervicalgia  M54 2        Start Time: 1228  Stop Time: 1100  Total time in clinic (min): 45 minutes    Subjective: Patient has no complaints of pain  Progressing well with therapy  Objective: See treatment diary below    Assessment: Tolerated treatment well  Continue to progress functional ankle strengthening and NMR  Plan: Continue per plan of care  Precautions: Able to wean out of CAM boot, WBAT in sneaker  Per script no running, jogging or jumping at this time      Manuals    L Ankle 5' 7' 8 5    Cervical STM/ROM 10' D/C  10                    Neuro Re-Ed         BOSU Lunges        SLS    10"x3 15"x3   Tandem Stance        BAPS Board 30x 20x ea 20x ea 30x ea 30x ea   Foam SLS   3x10"     Pods romberg  S  romberg   2x ea 30"     Pod walk   6x 4x ea condition    Ther Ex        NuStep L 4 5 mins L5 10' L4 7 min 10 L6 10'   HR/TR  30x 30x 30x 30x   ProStretch  10"x10 10"x10 10"x10 10"x10   slant  30"x4 L3 30"x4 30"x4 30"x4   Ankle ROM  30x 30x ea     Ankle Circles  30x ea 30x ea     Calf Stretch with towel   30"x3 30"x3 30"x3   Ankle TBand        Ankle Isolator Isolator 3# 3#/30x ea 3#/30x ea 3#/ 30 3# 30x   Calf press   30#/30 30#/ 30 30# 30x   Leg press   75#/30 75#/ 30 75# 30x           Ther Activity  "      Stepups F/L  30x ea 30x ea 30x 30x   Stepdowns  20x 30x 30x ea side 30x ea side   Gait Training                        Modalities        CP prn

## 2021-07-26 ENCOUNTER — APPOINTMENT (OUTPATIENT)
Dept: PHYSICAL THERAPY | Age: 61
End: 2021-07-26
Payer: COMMERCIAL

## 2021-07-27 ENCOUNTER — OFFICE VISIT (OUTPATIENT)
Dept: PHYSICAL THERAPY | Age: 61
End: 2021-07-27
Payer: COMMERCIAL

## 2021-07-27 DIAGNOSIS — M54.2 CERVICALGIA: ICD-10-CM

## 2021-07-27 DIAGNOSIS — S82.822D CLOSED TORUS FRACTURE OF DISTAL END OF LEFT FIBULA WITH ROUTINE HEALING, SUBSEQUENT ENCOUNTER: Primary | ICD-10-CM

## 2021-07-27 PROCEDURE — 97112 NEUROMUSCULAR REEDUCATION: CPT

## 2021-07-27 PROCEDURE — 97110 THERAPEUTIC EXERCISES: CPT

## 2021-07-27 NOTE — PROGRESS NOTES
Daily Note     Today's date: 2021  Patient name: Saintclair Hazy  : 1960  MRN: [id number]  Referring provider: TOMÁS Sweet  Dx:   Encounter Diagnosis     ICD-10-CM    1  Closed torus fracture of distal end of left fibula with routine healing, subsequent encounter  S82 822D    2  Cervicalgia  M54 2                   Subjective: Patient reports that she is doing well today with no complaints of pain,just stiffness  Notes that she was not sore following last session  Objective: See treatment diary below    Assessment: Tolerated treatment well  Re educated pt on the importance of self stretching at home since she has been semi- complaint  Challenged with single leg stance with finger tip A needed  Overall doing well with current POC  Plan: Continue per plan of care  Precautions: Able to wean out of CAM boot, WBAT in sneaker  Per script no running, jogging or jumping at this time      Manuals    L Ankle   8 5    Cervical STM/ROM    10                    Neuro Re-Ed         BOSU Lunges        SLS 15"x3   10"x3 15"x3   Tandem Stance        BAPS Board 30x ea  20x ea 30x ea 30x ea   Foam SLS   3x10"     Pods romberg  S  romberg   2x ea 30"     Pod walk   6x 4x ea condition    Ther Ex        NuStep L6 10'  L4 7 min 10 L6 10'   HR/TR 30x  30x 30x 30x   ProStretch 10"x10  10"x10 10"x10 10"x10   slant 30"x4  30"x4 30"x4 30"x4   Ankle ROM   30x ea     Ankle Circles   30x ea     Calf Stretch with towel 30"x4  30"x3 30"x3 30"x3   Ankle TBand        Ankle Isolator 3# 30x  3#/30x ea 3#/ 30 3# 30x   Calf press 30# 30x  30#/30 30#/ 30 30# 30x   Leg press 75# 30x  75#/30 75#/ 30 75# 30x           Ther Activity        Stepups F/L 30x  30x ea 30x 30x   Stepdowns 30x ea side  30x 30x ea side 30x ea side   Gait Training                        Modalities        CP prn

## 2021-07-30 ENCOUNTER — OFFICE VISIT (OUTPATIENT)
Dept: PHYSICAL THERAPY | Age: 61
End: 2021-07-30
Payer: COMMERCIAL

## 2021-07-30 DIAGNOSIS — S82.822D CLOSED TORUS FRACTURE OF DISTAL END OF LEFT FIBULA WITH ROUTINE HEALING, SUBSEQUENT ENCOUNTER: Primary | ICD-10-CM

## 2021-07-30 DIAGNOSIS — M54.2 CERVICALGIA: ICD-10-CM

## 2021-07-30 PROCEDURE — 97110 THERAPEUTIC EXERCISES: CPT

## 2021-07-30 PROCEDURE — 97112 NEUROMUSCULAR REEDUCATION: CPT

## 2021-07-30 NOTE — PROGRESS NOTES
Daily Note     Today's date: 2021  Patient name: Brittany Damian  : 1960  MRN: [id number]  Referring provider: TOMÁS Fairchild  Dx:   Encounter Diagnosis     ICD-10-CM    1  Closed torus fracture of distal end of left fibula with routine healing, subsequent encounter  S82 822D    2  Cervicalgia  M54 2                   Subjective: Patient reports overall improvements in the ankle  Chief complaint is discomfort located on medial malleoli, when walking up pool/attic latter and uneven surfaces  Objective: See treatment diary below    Assessment: Good tolerance to TE program, and gentle progressions  Notable imporevments in strength  Occasional balance disturbances on unstable surfaces with patient able to utilize ankle strategy to correct  Reviewed and updates home exercise program, pt demonstrates verbal  understanding of prescribed exercises and instructions  Patient advised to stop performing home exercise program if symptoms increase or new complaints developed  Education provided on the importance of maintenance and regular engagement in HEP- verbal understanding demonstrated  No complaints offered post session  Plan: D/C per primary PT  Precautions: Able to wean out of CAM boot, WBAT in sneaker  Per script no running, jogging or jumping at this time      Manuals    L Ankle   8 5    Cervical STM/ROM    10                    Neuro Re-Ed         BOSU Lunges        SLS 15"x3 Foam 15"x3  10"x3 15"x3   Tandem Stance        BAPS Board 30x ea 30xea 20x ea 30x ea 30x ea   Foam SLS   3x10"     Pods romberg  S  romberg   2x ea 30"     Pod walk   6x 4x ea condition    Ther Ex        NuStep L6 10' L6 10' L4 7 min 10 L6 10'   HR/TR 30x Foam 30x 30x 30x 30x   ProStretch 10"x10  10"x10 10"x10 10"x10   slant 30"x4 30"x4 30"x4 30"x4 30"x4   Ankle ROM   30x ea     Ankle Circles   30x ea     Calf Stretch with towel 30"x4 30"x4 30"x3 30"x3 30"x3   Ankle TBand        Ankle Isolator 3# 30x 3# 30xea 3#/30x ea 3#/ 30 3# 30x   Calf press 30# 30x 35#/30 30#/30 30#/ 30 30# 30x   Leg press 75# 30x 75#/30 75#/30 75#/ 30 75# 30x           Ther Activity        Stepups F/L 30x 30x 30x ea 30x 30x   Stepdowns 30x ea side 30x ea 30x 30x ea side 30x ea side   Gait Training                        Modalities        CP prn

## 2021-08-16 NOTE — PROGRESS NOTES
[email or web address]        NAME: Bere Loyola is a 62 y o  female  : 1960    MRN: [id number]  DATE: 2021  TIME: 4:07 PM    Assessment and Plan   Acute foreign body of left heel, initial encounter [S90 852A]  1  Acute foreign body of left heel, initial encounter  cephalexin (KEFLEX) 500 mg capsule         Patient Instructions       Follow up with PCP in 3-5 days  Proceed to  ER if symptoms worsen  Chief Complaint     Chief Complaint   Patient presents with    Foot Pain     pt states that she stepped on a FB approx 3 months ago and is having continued pain, believes it is still in the wound  History of Present Illness       Ankle Injury   The incident occurred more than 1 week ago  The incident occurred at home  The injury mechanism is unknown  The pain is present in the left heel  The quality of the pain is described as aching  The pain is at a severity of 5/10  The pain is moderate  The pain has been constant since onset  Associated symptoms include an inability to bear weight  Possible foreign bodies include glass  The symptoms are aggravated by weight bearing  She has tried nothing for the symptoms  The treatment provided no relief  Review of Systems   Review of Systems   Constitutional: Negative  HENT: Negative  Respiratory: Negative  Cardiovascular: Negative  Musculoskeletal: Positive for gait problem  Skin: Positive for color change  All other systems reviewed and are negative          Current Medications       Current Outpatient Medications:     aspirin (ECOTRIN LOW STRENGTH) 81 mg EC tablet, Take 81 mg by mouth daily (Patient not taking: Reported on 2021), Disp: , Rfl:     Naproxen Sodium 220 MG CAPS, Take 440 mg by mouth 2 (two) times a day as needed (Patient not taking: Reported on 2021), Disp: , Rfl:     Current Allergies     Allergies as of 2021    (No Known Allergies)            The following portions of the patient's history were reviewed and updated as appropriate: allergies, current medications, past family history, past medical history, past social history, past surgical history and problem list      Past Medical History:   Diagnosis Date    No pertinent past medical history        Past Surgical History:   Procedure Laterality Date    ANKLE SURGERY      ROTATOR CUFF REPAIR Right     THYROIDECTOMY, PARTIAL      TUBAL LIGATION      VARICOSE VEIN SURGERY         Family History   Problem Relation Age of Onset    No Known Problems Mother     Diabetes Father     Lymphoma Sister     No Known Problems Daughter     No Known Problems Sister     Lymphoma Daughter     No Known Problems Daughter     No Known Problems Maternal Aunt          Medications have been verified  Objective   /72   Pulse 64   Temp 97 6 °F (36 4 °C) (Temporal)   Resp 18   Ht 5' 9" (1 753 m)   Wt 93 4 kg (206 lb)   SpO2 99%   BMI 30 42 kg/m²        Physical Exam     Physical Exam  Vitals and nursing note reviewed  Constitutional:       Appearance: Normal appearance  She is well-developed  Cardiovascular:      Rate and Rhythm: Normal rate and regular rhythm  Pulmonary:      Effort: Pulmonary effort is normal       Breath sounds: Normal breath sounds  Musculoskeletal:      Cervical back: Normal range of motion and neck supple  Skin:     Findings: Erythema present  Neurological:      Mental Status: She is alert  Universal Protocol:  Consent: Verbal consent obtained  Risks and benefits: risks, benefits and alternatives were discussed  Consent given by: patient  Time out: Immediately prior to procedure a "time out" was called to verify the correct patient, procedure, equipment, support staff and site/side marked as required    Patient understanding: patient states understanding of the procedure being performed  Patient consent: the patient's understanding of the procedure matches consent given  Procedure consent: procedure consent matches procedure scheduled    Foreign body removal    Date/Time: 4/5/2021 6:09 PM  Performed by: Clementine Price MD  Authorized by: Clementine Price MD   Body area: skin  General location: lower extremity  Location details: left foot  Anesthesia: local infiltration    Sedation:  Patient sedated: no  Patient restrained: no  Removal mechanism: scalpel  Tendon involvement: none  Depth: subcutaneous  Complexity: simple  Post-procedure assessment: foreign body removed

## 2021-09-02 PROCEDURE — 88305 TISSUE EXAM BY PATHOLOGIST: CPT | Performed by: PATHOLOGY

## 2021-09-02 PROCEDURE — 88175 CYTOPATH C/V AUTO FLUID REDO: CPT | Performed by: SPECIALIST

## 2021-09-07 ENCOUNTER — LAB REQUISITION (OUTPATIENT)
Dept: LAB | Facility: HOSPITAL | Age: 61
End: 2021-09-07
Payer: COMMERCIAL

## 2021-09-07 DIAGNOSIS — Z01.419 ENCOUNTER FOR GYNECOLOGICAL EXAMINATION (GENERAL) (ROUTINE) WITHOUT ABNORMAL FINDINGS: ICD-10-CM

## 2021-09-08 ENCOUNTER — LAB REQUISITION (OUTPATIENT)
Dept: LAB | Facility: HOSPITAL | Age: 61
End: 2021-09-08
Payer: COMMERCIAL

## 2021-09-08 DIAGNOSIS — N95.1 MENOPAUSAL AND FEMALE CLIMACTERIC STATES: ICD-10-CM

## 2021-09-13 LAB
LAB AP GYN PRIMARY INTERPRETATION: NORMAL
Lab: NORMAL

## 2021-10-05 ENCOUNTER — HOSPITAL ENCOUNTER (OUTPATIENT)
Dept: MAMMOGRAPHY | Facility: HOSPITAL | Age: 61
Discharge: HOME | End: 2021-10-05
Attending: SPECIALIST
Payer: COMMERCIAL

## 2021-10-05 VITALS — BODY MASS INDEX: 29.62 KG/M2 | HEIGHT: 69 IN | WEIGHT: 200 LBS

## 2021-10-05 DIAGNOSIS — Z12.31 ENCOUNTER FOR SCREENING MAMMOGRAM FOR MALIGNANT NEOPLASM OF BREAST: ICD-10-CM

## 2021-10-05 PROCEDURE — 77063 BREAST TOMOSYNTHESIS BI: CPT

## 2021-10-05 PROCEDURE — 77067 SCR MAMMO BI INCL CAD: CPT

## 2021-12-27 PROCEDURE — 87636 SARSCOV2 & INF A&B AMP PRB: CPT | Performed by: NURSE PRACTITIONER

## 2021-12-30 PROCEDURE — U0003 INFECTIOUS AGENT DETECTION BY NUCLEIC ACID (DNA OR RNA); SEVERE ACUTE RESPIRATORY SYNDROME CORONAVIRUS 2 (SARS-COV-2) (CORONAVIRUS DISEASE [COVID-19]), AMPLIFIED PROBE TECHNIQUE, MAKING USE OF HIGH THROUGHPUT TECHNOLOGIES AS DESCRIBED BY CMS-2020-01-R: HCPCS | Performed by: NURSE PRACTITIONER

## 2022-01-18 ENCOUNTER — IMMUNIZATIONS (OUTPATIENT)
Dept: FAMILY MEDICINE CLINIC | Facility: HOSPITAL | Age: 62
End: 2022-01-18

## 2022-01-18 DIAGNOSIS — Z23 ENCOUNTER FOR IMMUNIZATION: Primary | ICD-10-CM

## 2022-01-18 PROCEDURE — 0064A COVID-19 MODERNA VACC 0.25 ML BOOSTER: CPT

## 2022-01-18 PROCEDURE — 91306 COVID-19 MODERNA VACC 0.25 ML BOOSTER: CPT

## 2022-03-17 ENCOUNTER — OFFICE VISIT (OUTPATIENT)
Dept: SURGERY | Facility: CLINIC | Age: 62
End: 2022-03-17
Payer: COMMERCIAL

## 2022-03-17 VITALS
BODY MASS INDEX: 29.62 KG/M2 | HEIGHT: 69 IN | RESPIRATION RATE: 16 BRPM | DIASTOLIC BLOOD PRESSURE: 80 MMHG | HEART RATE: 66 BPM | SYSTOLIC BLOOD PRESSURE: 118 MMHG | WEIGHT: 200 LBS | OXYGEN SATURATION: 99 % | TEMPERATURE: 98.3 F

## 2022-03-17 DIAGNOSIS — R22.41 LUMP OF RIGHT THIGH: ICD-10-CM

## 2022-03-17 DIAGNOSIS — M79.89 SOFT TISSUE MASS: Primary | ICD-10-CM

## 2022-03-17 PROCEDURE — 99243 OFF/OP CNSLTJ NEW/EST LOW 30: CPT | Performed by: SURGERY

## 2022-03-17 RX ORDER — SODIUM CHLORIDE, SODIUM LACTATE, POTASSIUM CHLORIDE, CALCIUM CHLORIDE 600; 310; 30; 20 MG/100ML; MG/100ML; MG/100ML; MG/100ML
125 INJECTION, SOLUTION INTRAVENOUS
Status: CANCELLED | OUTPATIENT
Start: 2022-03-17 | End: 2022-03-18

## 2022-03-17 NOTE — PROGRESS NOTES
Consult- General Surgery   Marianne Contreras 62 y o  female MRN: [id number]  Unit/Bed#:  Encounter: [id number]    Assessment/Plan     Assessment:  Soft tissue mass left chest wall  Soft tissue mass right posterior thigh    Plan:  The patient noted increasing in size of both lesions on the right posterior thigh and on the left posterior chest   I advised the patient to undergo excision of both lesions under IV sedation in the near future  I discussed the operative procedure, risks, benefits, alternatives and possible complications, she understood and agreed to proceed  History of Present Illness     HPI:  Marianne Contreras is a 62 y o  female who presents to my office for evaluation of soft tissue mass from the right posterior thigh and left chest wall  The patient noted discomfort and pain from the right posterior thigh mass, improves with increasing activity  Denies any drainage, redness or any other constitutional symptoms  In addition she noted left anterior chest wall mass which has been increasing in size, causing discomfort specially with moving her arm      Review of Systems:      Historical Information   Past Medical History:   Diagnosis Date    No pertinent past medical history      Past Surgical History:   Procedure Laterality Date    ANKLE SURGERY      ROTATOR CUFF REPAIR Right     THYROIDECTOMY, PARTIAL      TUBAL LIGATION      VARICOSE VEIN SURGERY       Social History   Social History     Substance and Sexual Activity   Alcohol Use Not Currently     Social History     Substance and Sexual Activity   Drug Use Not Currently     Social History     Tobacco Use   Smoking Status Never Smoker   Smokeless Tobacco Never Used     Family History: non-contributory    Meds/Allergies   all medications and allergies reviewed     Current Outpatient Medications:     aspirin (ECOTRIN LOW STRENGTH) 81 mg EC tablet, Take 81 mg by mouth daily (Patient not taking: Reported on 7/6/2021), Disp: , Rfl:    Naproxen Sodium 220 MG CAPS, Take 440 mg by mouth 2 (two) times a day as needed (Patient not taking: Reported on 7/6/2021), Disp: , Rfl:   No Known Allergies    Objective     Current Vitals:   Blood Pressure: 118/80 (03/17/22 0822)  Pulse: 66 (03/17/22 0822)  Temperature: 98 3 °F (36 8 °C) (03/17/22 0822)  Temp Source: Oral (03/17/22 8295)  Respirations: 16 (03/17/22 0822)  Height: 5' 9" (175 3 cm) (03/17/22 4074)  Weight - Scale: 90 7 kg (200 lb) (03/17/22 0822)  SpO2: 99 % (03/17/22 7704)    Physical Exam  Vitals and nursing note reviewed  Constitutional:       General: She is not in acute distress  Cardiovascular:      Rate and Rhythm: Normal rate and regular rhythm  Heart sounds: No murmur heard  Pulmonary:      Effort: No respiratory distress  Breath sounds: Normal breath sounds  Abdominal:      Palpations: Abdomen is soft  There is no mass  Tenderness: There is no abdominal tenderness  Skin:     General: Skin is warm  Coloration: Skin is not jaundiced  Findings: No erythema or rash  Comments: 3 x 4 cm soft tissue mass on the right posterior thigh, no skin color changes, freely mobile  2 5 cm soft tissue mass on the anterior left chest wall, suspicious for sebaceous cyst   No evidence of infection  Neurological:      Mental Status: She is alert and oriented to person, place, and time  Cranial Nerves: No cranial nerve deficit     Psychiatric:         Mood and Affect: Mood normal          Behavior: Behavior normal

## 2022-05-04 ENCOUNTER — ANESTHESIA EVENT (OUTPATIENT)
Dept: PERIOP | Facility: HOSPITAL | Age: 62
End: 2022-05-04
Payer: COMMERCIAL

## 2022-05-10 PROCEDURE — 88304 TISSUE EXAM BY PATHOLOGIST: CPT | Performed by: PATHOLOGY

## 2022-05-12 NOTE — ANESTHESIA PREPROCEDURE EVALUATION
Procedure:  EXCISION BIOPSY TISSUE LESION/MASS LOWER EXTREMITY RIGHT POSTERIOR THIGH (Right Thigh)  EXCISION  BIOPSY LEFT CHEST WALL SOFT TISSUE MASS (Left Chest)    Relevant Problems   ANESTHESIA (within normal limits)   (-) History of anesthesia complications      CARDIO   (-) Chest pain   (-) COHEN (dyspnea on exertion)      GI/HEPATIC   (-) Gastroesophageal reflux disease      PULMONARY   (-) Shortness of breath   (-) URI (upper respiratory infection)        Physical Exam    Airway    Mallampati score: I  TM Distance: >3 FB  Neck ROM: full     Dental   No notable dental hx     Cardiovascular      Pulmonary      Other Findings        Anesthesia Plan  ASA Score- 1     Anesthesia Type- IV sedation with anesthesia with ASA Monitors  Additional Monitors:   Airway Plan:           Plan Factors-Exercise tolerance (METS): >4 METS  Chart reviewed  Patient summary reviewed  Induction- intravenous  Postoperative Plan-     Informed Consent- Anesthetic plan and risks discussed with patient  I personally reviewed this patient with the CRNA  Discussed and agreed on the Anesthesia Plan with the CRNA  Anupama Marquez

## 2022-05-13 ENCOUNTER — HOSPITAL ENCOUNTER (OUTPATIENT)
Facility: HOSPITAL | Age: 62
Setting detail: OUTPATIENT SURGERY
Discharge: HOME | End: 2022-05-13
Attending: SURGERY | Admitting: SURGERY
Payer: COMMERCIAL

## 2022-05-13 ENCOUNTER — ANESTHESIA (OUTPATIENT)
Dept: PERIOP | Facility: HOSPITAL | Age: 62
End: 2022-05-13
Payer: COMMERCIAL

## 2022-05-13 VITALS
HEIGHT: 69 IN | BODY MASS INDEX: 29.03 KG/M2 | OXYGEN SATURATION: 98 % | RESPIRATION RATE: 20 BRPM | HEART RATE: 61 BPM | WEIGHT: 195.99 LBS | DIASTOLIC BLOOD PRESSURE: 74 MMHG | TEMPERATURE: 98.1 F | SYSTOLIC BLOOD PRESSURE: 107 MMHG

## 2022-05-13 DIAGNOSIS — R22.41 LUMP OF RIGHT THIGH: ICD-10-CM

## 2022-05-13 DIAGNOSIS — M79.89 SOFT TISSUE MASS: ICD-10-CM

## 2022-05-13 PROBLEM — L72.3 SEBACEOUS CYST: Chronic | Status: ACTIVE | Noted: 2022-05-13

## 2022-05-13 PROBLEM — D17.20 LIPOMA OF LOWER EXTREMITY: Chronic | Status: ACTIVE | Noted: 2022-05-13

## 2022-05-13 PROCEDURE — 88304 TISSUE EXAM BY PATHOLOGIST: CPT | Performed by: PATHOLOGY

## 2022-05-13 PROCEDURE — 27337 EXC THIGH/KNEE LES SC 3 CM/>: CPT | Performed by: SURGERY

## 2022-05-13 PROCEDURE — NC001 PR NO CHARGE: Performed by: SURGERY

## 2022-05-13 PROCEDURE — 27337 EXC THIGH/KNEE LES SC 3 CM/>: CPT | Performed by: PHYSICIAN ASSISTANT

## 2022-05-13 PROCEDURE — 11403 EXC TR-EXT B9+MARG 2.1-3CM: CPT | Performed by: SURGERY

## 2022-05-13 PROCEDURE — 11403 EXC TR-EXT B9+MARG 2.1-3CM: CPT | Performed by: PHYSICIAN ASSISTANT

## 2022-05-13 RX ORDER — LIDOCAINE HYDROCHLORIDE 10 MG/ML
INJECTION, SOLUTION EPIDURAL; INFILTRATION; INTRACAUDAL; PERINEURAL AS NEEDED
Status: DISCONTINUED | OUTPATIENT
Start: 2022-05-13 | End: 2022-05-13 | Stop reason: HOSPADM

## 2022-05-13 RX ORDER — ACETAMINOPHEN 325 MG/1
975 TABLET ORAL EVERY 8 HOURS PRN
Status: CANCELLED | OUTPATIENT
Start: 2022-05-13

## 2022-05-13 RX ORDER — PROPOFOL 10 MG/ML
INJECTION, EMULSION INTRAVENOUS CONTINUOUS PRN
Status: DISCONTINUED | OUTPATIENT
Start: 2022-05-13 | End: 2022-05-13

## 2022-05-13 RX ORDER — DEXAMETHASONE SODIUM PHOSPHATE 10 MG/ML
INJECTION, SOLUTION INTRAMUSCULAR; INTRAVENOUS AS NEEDED
Status: DISCONTINUED | OUTPATIENT
Start: 2022-05-13 | End: 2022-05-13

## 2022-05-13 RX ORDER — LIDOCAINE HYDROCHLORIDE 20 MG/ML
INJECTION, SOLUTION EPIDURAL; INFILTRATION; INTRACAUDAL; PERINEURAL AS NEEDED
Status: DISCONTINUED | OUTPATIENT
Start: 2022-05-13 | End: 2022-05-13

## 2022-05-13 RX ORDER — HYDROMORPHONE HCL/PF 1 MG/ML
0.2 SYRINGE (ML) INJECTION
Status: DISCONTINUED | OUTPATIENT
Start: 2022-05-13 | End: 2022-05-13 | Stop reason: HOSPADM

## 2022-05-13 RX ORDER — GLYCOPYRROLATE 0.2 MG/ML
INJECTION INTRAMUSCULAR; INTRAVENOUS AS NEEDED
Status: DISCONTINUED | OUTPATIENT
Start: 2022-05-13 | End: 2022-05-13

## 2022-05-13 RX ORDER — FENTANYL CITRATE 50 UG/ML
INJECTION, SOLUTION INTRAMUSCULAR; INTRAVENOUS AS NEEDED
Status: DISCONTINUED | OUTPATIENT
Start: 2022-05-13 | End: 2022-05-13

## 2022-05-13 RX ORDER — EPHEDRINE SULFATE 50 MG/ML
INJECTION INTRAVENOUS AS NEEDED
Status: DISCONTINUED | OUTPATIENT
Start: 2022-05-13 | End: 2022-05-13

## 2022-05-13 RX ORDER — MIDAZOLAM HYDROCHLORIDE 2 MG/2ML
INJECTION, SOLUTION INTRAMUSCULAR; INTRAVENOUS AS NEEDED
Status: DISCONTINUED | OUTPATIENT
Start: 2022-05-13 | End: 2022-05-13

## 2022-05-13 RX ORDER — ONDANSETRON 2 MG/ML
INJECTION INTRAMUSCULAR; INTRAVENOUS AS NEEDED
Status: DISCONTINUED | OUTPATIENT
Start: 2022-05-13 | End: 2022-05-13

## 2022-05-13 RX ORDER — CEFAZOLIN SODIUM 2 G/50ML
2000 SOLUTION INTRAVENOUS ONCE
Status: COMPLETED | OUTPATIENT
Start: 2022-05-13 | End: 2022-05-13

## 2022-05-13 RX ORDER — DEXMEDETOMIDINE HYDROCHLORIDE 100 UG/ML
INJECTION, SOLUTION INTRAVENOUS AS NEEDED
Status: DISCONTINUED | OUTPATIENT
Start: 2022-05-13 | End: 2022-05-13

## 2022-05-13 RX ORDER — LIDOCAINE HYDROCHLORIDE 10 MG/ML
0.5 INJECTION, SOLUTION EPIDURAL; INFILTRATION; INTRACAUDAL; PERINEURAL ONCE AS NEEDED
Status: DISCONTINUED | OUTPATIENT
Start: 2022-05-13 | End: 2022-05-13 | Stop reason: HOSPADM

## 2022-05-13 RX ORDER — SODIUM CHLORIDE, SODIUM LACTATE, POTASSIUM CHLORIDE, CALCIUM CHLORIDE 600; 310; 30; 20 MG/100ML; MG/100ML; MG/100ML; MG/100ML
125 INJECTION, SOLUTION INTRAVENOUS
Status: COMPLETED | OUTPATIENT
Start: 2022-05-13 | End: 2022-05-13

## 2022-05-13 RX ORDER — ONDANSETRON 2 MG/ML
4 INJECTION INTRAMUSCULAR; INTRAVENOUS ONCE AS NEEDED
Status: DISCONTINUED | OUTPATIENT
Start: 2022-05-13 | End: 2022-05-13 | Stop reason: HOSPADM

## 2022-05-13 RX ORDER — FENTANYL CITRATE/PF 50 MCG/ML
25 SYRINGE (ML) INJECTION
Status: DISCONTINUED | OUTPATIENT
Start: 2022-05-13 | End: 2022-05-13 | Stop reason: HOSPADM

## 2022-05-13 RX ORDER — MAGNESIUM HYDROXIDE 1200 MG/15ML
LIQUID ORAL AS NEEDED
Status: DISCONTINUED | OUTPATIENT
Start: 2022-05-13 | End: 2022-05-13 | Stop reason: HOSPADM

## 2022-05-13 RX ORDER — SODIUM CHLORIDE, SODIUM LACTATE, POTASSIUM CHLORIDE, CALCIUM CHLORIDE 600; 310; 30; 20 MG/100ML; MG/100ML; MG/100ML; MG/100ML
125 INJECTION, SOLUTION INTRAVENOUS CONTINUOUS
Status: DISCONTINUED | OUTPATIENT
Start: 2022-05-13 | End: 2022-05-13 | Stop reason: HOSPADM

## 2022-05-13 RX ADMIN — FENTANYL CITRATE 50 MCG: 50 INJECTION, SOLUTION INTRAMUSCULAR; INTRAVENOUS at 07:58

## 2022-05-13 RX ADMIN — MIDAZOLAM HYDROCHLORIDE 2 MG: 1 INJECTION, SOLUTION INTRAMUSCULAR; INTRAVENOUS at 07:50

## 2022-05-13 RX ADMIN — SODIUM CHLORIDE, SODIUM LACTATE, POTASSIUM CHLORIDE, AND CALCIUM CHLORIDE: .6; .31; .03; .02 INJECTION, SOLUTION INTRAVENOUS at 07:37

## 2022-05-13 RX ADMIN — DEXMEDETOMIDINE HCL 8 MCG: 100 INJECTION INTRAVENOUS at 07:58

## 2022-05-13 RX ADMIN — CEFAZOLIN SODIUM 2000 MG: 2 SOLUTION INTRAVENOUS at 07:49

## 2022-05-13 RX ADMIN — FENTANYL CITRATE 25 MCG: 50 INJECTION, SOLUTION INTRAMUSCULAR; INTRAVENOUS at 08:13

## 2022-05-13 RX ADMIN — SODIUM CHLORIDE, SODIUM LACTATE, POTASSIUM CHLORIDE, AND CALCIUM CHLORIDE 125 ML/HR: .6; .31; .03; .02 INJECTION, SOLUTION INTRAVENOUS at 07:49

## 2022-05-13 RX ADMIN — EPHEDRINE SULFATE 5 MG: 50 INJECTION, SOLUTION INTRAVENOUS at 08:20

## 2022-05-13 RX ADMIN — DEXAMETHASONE SODIUM PHOSPHATE 8 MG: 10 INJECTION INTRAMUSCULAR; INTRAVENOUS at 08:00

## 2022-05-13 RX ADMIN — DEXMEDETOMIDINE HCL 4 MCG: 100 INJECTION INTRAVENOUS at 08:09

## 2022-05-13 RX ADMIN — PROPOFOL 120 MCG/KG/MIN: 10 INJECTION, EMULSION INTRAVENOUS at 08:00

## 2022-05-13 RX ADMIN — FENTANYL CITRATE 25 MCG: 50 INJECTION, SOLUTION INTRAMUSCULAR; INTRAVENOUS at 08:21

## 2022-05-13 RX ADMIN — LIDOCAINE HYDROCHLORIDE 80 MG: 20 INJECTION, SOLUTION EPIDURAL; INFILTRATION; INTRACAUDAL; PERINEURAL at 08:00

## 2022-05-13 RX ADMIN — ONDANSETRON 4 MG: 2 INJECTION INTRAMUSCULAR; INTRAVENOUS at 07:50

## 2022-05-13 RX ADMIN — GLYCOPYRROLATE 0.1 MCG: 0.2 INJECTION, SOLUTION INTRAMUSCULAR; INTRAVENOUS at 07:57

## 2022-05-13 NOTE — DISCHARGE INSTRUCTIONS
SURGERY INSTRUCTIONS    No diet restriction for this surgery  May shower every day starting tomorrow  Remove plastic dressings in 3 days  Remove strips in 7 days  Nothing should be covering incisions 7 days after surgery  Call office to make an appointment in 2 weeks [phone]  Call office with any issues regarding the surgery  No heavy lifting or strenuous exercise for one week  Resume home medications starting today  Resume blood thinners starting tomorrow  (Aspirin, Coumadin, Eliquis, Xarelto)  Apply ice to the incisions  10 minutes every hour for 2 days  May take regular Tylenol or ibuprofen for pain   Please let us know how we did, fill out survey that you may receive via email or regular mail, thank you!!

## 2022-05-13 NOTE — H&P
History of physical- General Surgery   Alison Farah 62 y o  female MRN: [id number]  Unit/Bed#: OR CUATE Encounter: [id number]    Assessment/Plan     Assessment:  Soft tissue mass left chest wall  Soft tissue mass right posterior thigh  Plan:  Patient is here at the hospital for elective excision of soft tissue mass from the left chest wall and soft tissue mass from right posterior thigh  I discussed the operative procedure, risks, benefits, alternatives and possible complications, she understood and agreed to proceed  History of Present Illness      HPI:  Alison Farah is a 62 y o  female who presents to the hospital for elective excision of soft tissue mass from the left chest wall and right posterior thigh  She still complains of discomfort and pain from the right posterior mass  She was originally seen in my office on March 17  See consultation for details  Consults    Review of Systems: The rest of the review of system total of 10 were negative except for the HPI      Historical Information   Past Medical History:   Diagnosis Date    No pertinent past medical history      Past Surgical History:   Procedure Laterality Date    ANKLE SURGERY      ROTATOR CUFF REPAIR Right     THYROIDECTOMY, PARTIAL      TUBAL LIGATION      VARICOSE VEIN SURGERY       Social History   Social History     Substance and Sexual Activity   Alcohol Use Not Currently     Social History     Substance and Sexual Activity   Drug Use Not Currently     E-Cigarette/Vaping    E-Cigarette Use Never User      E-Cigarette/Vaping Substances    Nicotine No     THC No     CBD No     Flavoring No     Other No     Unknown No      Social History     Tobacco Use   Smoking Status Never Smoker   Smokeless Tobacco Never Used     Family History: non-contributory    Meds/Allergies   all current active meds have been reviewed, current meds:   Current Facility-Administered Medications   Medication Dose Route Frequency    ceFAZolin (ANCEF) IVPB (premix in dextrose) 2,000 mg 50 mL  2,000 mg Intravenous Once    lactated ringers infusion  125 mL/hr Intravenous Continuous    lactated ringers infusion  125 mL/hr Intravenous On Call To OR    lidocaine (PF) (XYLOCAINE-MPF) 1 % injection 0 5 mL  0 5 mL Infiltration Once PRN    and PTA meds:   Prior to Admission Medications   Prescriptions Last Dose Informant Patient Reported? Taking? Naproxen Sodium 220 MG CAPS  Self Yes No   Sig: Take 440 mg by mouth 2 (two) times a day as needed   Patient not taking: Reported on 7/6/2021   aspirin (ECOTRIN LOW STRENGTH) 81 mg EC tablet  Self Yes No   Sig: Take 81 mg by mouth daily   Patient not taking: Reported on 7/6/2021      Facility-Administered Medications: None     No Known Allergies    Objective   First Vitals:   Blood Pressure: 114/79 (05/13/22 0704)  Pulse: 66 (05/13/22 0704)  Temperature: 97 7 °F (36 5 °C) (05/13/22 0704)  Temp Source: Oral (05/13/22 0704)  Respirations: 16 (05/13/22 0704)  Height: 5' 9" (175 3 cm) (05/13/22 0704)  Weight - Scale: 88 9 kg (195 lb 15 8 oz) (05/13/22 0704)  SpO2: 100 % (05/13/22 0704)    Current Vitals:   Blood Pressure: 114/79 (05/13/22 0704)  Pulse: 66 (05/13/22 0704)  Temperature: 97 7 °F (36 5 °C) (05/13/22 0704)  Temp Source: Oral (05/13/22 0704)  Respirations: 16 (05/13/22 0704)  Height: 5' 9" (175 3 cm) (05/13/22 0704)  Weight - Scale: 88 9 kg (195 lb 15 8 oz) (05/13/22 0704)  SpO2: 100 % (05/13/22 0704)    No intake or output data in the 24 hours ending 05/13/22 0746    Invasive Devices  Report    Peripheral Intravenous Line  Duration           Peripheral IV 05/13/22 Dorsal (posterior); Right Hand <1 day                Physical Exam  Vitals and nursing note reviewed  Constitutional:       General: She is not in acute distress  Cardiovascular:      Rate and Rhythm: Normal rate and regular rhythm  Heart sounds: No murmur heard  Pulmonary:      Effort: No respiratory distress        Breath sounds: Normal breath sounds  Abdominal:      Palpations: Abdomen is soft  There is no mass  Tenderness: There is no abdominal tenderness  Skin:     General: Skin is warm  Coloration: Skin is not jaundiced  Findings: No erythema or rash  Comments: 3 x 4 cm soft tissue mass on the right posterior thigh  2 5 cm soft tissue mass on the left chest wall  Neurological:      Mental Status: She is alert and oriented to person, place, and time  Cranial Nerves: No cranial nerve deficit     Psychiatric:         Mood and Affect: Mood normal          Behavior: Behavior normal

## 2022-05-13 NOTE — OP NOTE
OPERATIVE REPORT  PATIENT NAME: Elisabeth Porter    :  1960  MRN: [id number]  Pt Location: [facility] OR ROOM 03    SURGERY DATE: 2022    Surgeon(s) and Role:     * Rashi Pandey MD - Primary     * Ashely Pérez PA-C - Assisting    Preop Diagnosis:  Soft tissue mass [M79 89]  Lump of right thigh [R22 41]    Post-Op Diagnosis Codes:     Lipoma 4 x 2 5 cm right posterior thigh  2 5 cm sebaceous cyst left chest wall    Procedure(s) (LRB):  EXCISION BIOPSY TISSUE LESION/MASS LOWER EXTREMITY RIGHT POSTERIOR THIGH (Right)  EXCISION  BIOPSY LEFT CHEST WALL SOFT TISSUE MASS (Left)    Specimen(s):  ID Type Source Tests Collected by Time Destination   1 : lipoma right posterior thigh  Tissue Soft Tissue, Lipoma TISSUE EXAM Rashi Pandey MD 2022 0815    2 : sebaceous cyst of left chest wall Tissue Cyst TISSUE EXAM Rashi Pandey MD 2022 0643        Estimated Blood Loss:   Minimal    Drains:  None    Anesthesia Type:   IV Sedation with Anesthesia    Operative Indications:  Soft tissue mass [M79 89]  Lump of right thigh [R22 41]    Operative Findings:  The patient had a multilobulated lipoma on the right posterior thigh measuring 4 x 2 5 cm, closest margin 2 mm  2 5 cm sebaceous cyst left chest wall, closest margin 2 mm  Complications:   None    Procedure and Technique:  The patient was identified in the patient was placed in the operating table on the left lateral decubitus  After adequate IV sedation the right posterior thigh was prepped and draped in sterile usual fashion with ChloraPrep  Time-out was called the patient was identified as was surgical site  1% lidocaine was infiltrated over the lump on the right posterior thigh  Longitudinal incision was made with a scalpel, taken down through the subcutaneous tissue with scalpel dissection, with blunt hemostat dissection the lobules of the lipoma were carefully dissected and removed  Hemostasis was accomplished with cautery    Wound was copiously irrigated with saline solution  Subcutaneous tissue was approximated with 3-0 Vicryl in an interrupted fashion  Skin was closed with 4-0 Vicryl in a continuous subcuticular fashion  Sterile dressing was applied  Our attention was directed to the left chest wall  The patient was placed in the supine position  The left chest wall was prepped and draped in sterile usual fashion with ChloraPrep  Time-out was called the patient was identified as was surgical site  1% lidocaine was infiltrated  An elliptical incision was made with a scalpel over the left chest wall lesion, this was taken down through the subcutaneous tissue with scalpel dissection until the cyst was completely excised  Hemostasis was accomplished with cautery  Subcutaneous tissue was approximated with 3-0 Vicryl in an interrupted fashion and skin was closed with 4-0 Vicryl in a continuous subcuticular fashion  Sterile dressing were applied  At the end of the case instrument, needles, sponges counts were correct  Patient tolerated the procedure well       I was present for the entire procedure, A qualified resident physician was not available and A physician assistant was required during the procedure for retraction tissue handling,dissection and suturing    Patient Disposition:  PACU  and hemodynamically stable      SIGNATURE: Fátima Pop MD  DATE: May 13, 2022  TIME: 8:42 AM

## 2022-05-13 NOTE — ANESTHESIA POSTPROCEDURE EVALUATION
Post-Op Assessment Note    CV Status:  Stable    Pain management: adequate     Mental Status:  Alert and awake   Hydration Status:  Euvolemic   PONV Controlled:  Controlled   Airway Patency:  Patent      Post Op Vitals Reviewed: Yes      Staff: Anesthesiologist, CRNA         No complications documented      BP 99/60 (05/13/22 0857)    Temp (!) 97 °F (36 1 °C) (05/13/22 0857)    Pulse 67 (05/13/22 0857)   Resp 19 (05/13/22 0857)    SpO2 98 % (05/13/22 0857)

## 2022-05-16 ENCOUNTER — HOSPITAL ENCOUNTER (EMERGENCY)
Facility: HOSPITAL | Age: 62
Discharge: HOME | End: 2022-05-16
Attending: EMERGENCY MEDICINE | Admitting: EMERGENCY MEDICINE
Payer: COMMERCIAL

## 2022-05-16 ENCOUNTER — OFFICE VISIT (OUTPATIENT)
Dept: URGENT CARE | Facility: CLINIC | Age: 62
End: 2022-05-16
Payer: COMMERCIAL

## 2022-05-16 ENCOUNTER — APPOINTMENT (EMERGENCY)
Dept: RADIOLOGY | Facility: HOSPITAL | Age: 62
End: 2022-05-16
Payer: COMMERCIAL

## 2022-05-16 VITALS
HEART RATE: 57 BPM | TEMPERATURE: 98.5 F | RESPIRATION RATE: 18 BRPM | SYSTOLIC BLOOD PRESSURE: 109 MMHG | OXYGEN SATURATION: 99 % | DIASTOLIC BLOOD PRESSURE: 70 MMHG

## 2022-05-16 VITALS
WEIGHT: 194 LBS | OXYGEN SATURATION: 100 % | DIASTOLIC BLOOD PRESSURE: 70 MMHG | SYSTOLIC BLOOD PRESSURE: 114 MMHG | HEART RATE: 63 BPM | TEMPERATURE: 97.7 F | BODY MASS INDEX: 28.73 KG/M2 | HEIGHT: 69 IN | RESPIRATION RATE: 18 BRPM

## 2022-05-16 DIAGNOSIS — M54.9 UPPER BACK PAIN ON RIGHT SIDE: Primary | ICD-10-CM

## 2022-05-16 DIAGNOSIS — M25.511 ACUTE PAIN OF RIGHT SHOULDER: Primary | ICD-10-CM

## 2022-05-16 LAB
ALBUMIN SERPL BCP-MCNC: 4 G/DL (ref 3.5–5)
ALP SERPL-CCNC: 92 U/L (ref 46–116)
ALT SERPL W P-5'-P-CCNC: 15 U/L (ref 12–78)
ANION GAP SERPL CALCULATED.3IONS-SCNC: 8 MMOL/L (ref 4–13)
AST SERPL W P-5'-P-CCNC: 11 U/L (ref 5–45)
ATRIAL RATE: 61 BPM
BASOPHILS # BLD AUTO: 0.12 THOUSANDS/ΜL (ref 0–0.1)
BASOPHILS NFR BLD AUTO: 2 % (ref 0–1)
BILIRUB DIRECT SERPL-MCNC: 0.17 MG/DL (ref 0–0.2)
BILIRUB SERPL-MCNC: 1.08 MG/DL (ref 0.2–1)
BUN SERPL-MCNC: 20 MG/DL (ref 5–25)
CALCIUM SERPL-MCNC: 9.2 MG/DL (ref 8.3–10.1)
CARDIAC TROPONIN I PNL SERPL HS: <2 NG/L
CHLORIDE SERPL-SCNC: 103 MMOL/L (ref 100–108)
CO2 SERPL-SCNC: 29 MMOL/L (ref 21–32)
CREAT SERPL-MCNC: 0.78 MG/DL (ref 0.6–1.3)
D DIMER PPP FEU-MCNC: 0.35 UG/ML FEU
EOSINOPHIL # BLD AUTO: 0.09 THOUSAND/ΜL (ref 0–0.61)
EOSINOPHIL NFR BLD AUTO: 1 % (ref 0–6)
ERYTHROCYTE [DISTWIDTH] IN BLOOD BY AUTOMATED COUNT: 12.9 % (ref 11.6–15.1)
GFR SERPL CREATININE-BSD FRML MDRD: 82 ML/MIN/1.73SQ M
GLUCOSE SERPL-MCNC: 83 MG/DL (ref 65–140)
HCT VFR BLD AUTO: 41.9 % (ref 34.8–46.1)
HGB BLD-MCNC: 13.2 G/DL (ref 11.5–15.4)
IMM GRANULOCYTES # BLD AUTO: 0.02 THOUSAND/UL (ref 0–0.2)
IMM GRANULOCYTES NFR BLD AUTO: 0 % (ref 0–2)
LYMPHOCYTES # BLD AUTO: 2.34 THOUSANDS/ΜL (ref 0.6–4.47)
LYMPHOCYTES NFR BLD AUTO: 32 % (ref 14–44)
MCH RBC QN AUTO: 29.8 PG (ref 26.8–34.3)
MCHC RBC AUTO-ENTMCNC: 31.5 G/DL (ref 31.4–37.4)
MCV RBC AUTO: 95 FL (ref 82–98)
MONOCYTES # BLD AUTO: 0.55 THOUSAND/ΜL (ref 0.17–1.22)
MONOCYTES NFR BLD AUTO: 8 % (ref 4–12)
NEUTROPHILS # BLD AUTO: 4.2 THOUSANDS/ΜL (ref 1.85–7.62)
NEUTS SEG NFR BLD AUTO: 57 % (ref 43–75)
NRBC BLD AUTO-RTO: 0 /100 WBCS
P AXIS: 77 DEGREES
PLATELET # BLD AUTO: 255 THOUSANDS/UL (ref 149–390)
PMV BLD AUTO: 11.5 FL (ref 8.9–12.7)
POTASSIUM SERPL-SCNC: 3.9 MMOL/L (ref 3.5–5.3)
PR INTERVAL: 176 MS
PROT SERPL-MCNC: 7.5 G/DL (ref 6.4–8.2)
QRS AXIS: 80 DEGREES
QRSD INTERVAL: 88 MS
QT INTERVAL: 430 MS
QTC INTERVAL: 432 MS
RBC # BLD AUTO: 4.43 MILLION/UL (ref 3.81–5.12)
SODIUM SERPL-SCNC: 140 MMOL/L (ref 136–145)
T WAVE AXIS: 76 DEGREES
VENTRICULAR RATE: 61 BPM
WBC # BLD AUTO: 7.32 THOUSAND/UL (ref 4.31–10.16)

## 2022-05-16 PROCEDURE — 85025 COMPLETE CBC W/AUTO DIFF WBC: CPT | Performed by: EMERGENCY MEDICINE

## 2022-05-16 PROCEDURE — 84484 ASSAY OF TROPONIN QUANT: CPT | Performed by: EMERGENCY MEDICINE

## 2022-05-16 PROCEDURE — 85379 FIBRIN DEGRADATION QUANT: CPT | Performed by: EMERGENCY MEDICINE

## 2022-05-16 PROCEDURE — 99284 EMERGENCY DEPT VISIT MOD MDM: CPT

## 2022-05-16 PROCEDURE — 80048 BASIC METABOLIC PNL TOTAL CA: CPT | Performed by: EMERGENCY MEDICINE

## 2022-05-16 PROCEDURE — 93005 ELECTROCARDIOGRAM TRACING: CPT

## 2022-05-16 PROCEDURE — 93010 ELECTROCARDIOGRAM REPORT: CPT | Performed by: INTERNAL MEDICINE

## 2022-05-16 PROCEDURE — 99213 OFFICE O/P EST LOW 20 MIN: CPT

## 2022-05-16 PROCEDURE — 36415 COLL VENOUS BLD VENIPUNCTURE: CPT | Performed by: EMERGENCY MEDICINE

## 2022-05-16 PROCEDURE — 71046 X-RAY EXAM CHEST 2 VIEWS: CPT

## 2022-05-16 PROCEDURE — 99285 EMERGENCY DEPT VISIT HI MDM: CPT | Performed by: EMERGENCY MEDICINE

## 2022-05-16 PROCEDURE — 80076 HEPATIC FUNCTION PANEL: CPT | Performed by: EMERGENCY MEDICINE

## 2022-05-16 RX ORDER — KETOROLAC TROMETHAMINE 30 MG/ML
15 INJECTION, SOLUTION INTRAMUSCULAR; INTRAVENOUS ONCE
Status: DISCONTINUED | OUTPATIENT
Start: 2022-05-16 | End: 2022-05-16 | Stop reason: HOSPADM

## 2022-05-16 RX ORDER — LIDOCAINE 50 MG/G
1 PATCH TOPICAL ONCE
Status: DISCONTINUED | OUTPATIENT
Start: 2022-05-16 | End: 2022-05-16 | Stop reason: HOSPADM

## 2022-05-16 RX ADMIN — LIDOCAINE 5% 1 PATCH: 700 PATCH TOPICAL at 16:17

## 2022-05-16 NOTE — ED PROVIDER NOTES
History  Chief Complaint   Patient presents with    Shoulder Pain     Pt presents with c/o shoulder pain with breathing, sx to remove lypoma this past Friday      Patient is a 59-year-old female with no significant past medical other than history of right DVT 30+ years ago when she was postpartum status post treatment with Lovenox, presents to the emergency department complaining of right upper back/shoulder blade pain which is pleuritic in nature that started this morning upon awakening  Patient states that she woke up with pain to the right upper back/shoulder region which has been constant and getting progressively worse throughout the day  Pain is sharp, worse with deep inhalation and coughing  She also reports just speaking seems to exacerbate the pain and takes her breath away but denies feeling short of breath  She is concerned about possible PE as she had surgery for a lipoma and cyst removal on Friday, proximally 4 days ago  She states she was not under general anesthesia but had sedation to where she was in "twilight "  Patient denies any significant coughing, production of phlegm, hemoptysis, recent URI symptoms, chest pain, palpitations, headache, dizziness or near syncope, diaphoresis, extremity swelling or pain, abdominal pain, nausea, vomiting, diarrhea, constipation, urinary symptoms, flank pain, low back pain, skin rash or color change, extremity weakness or paresthesia or other focal neurologic deficits  Denies any recent heavy lifting or strenuous activity  Patient is a [employer] employee  History provided by:  Patient   used: No    Shoulder Pain  Associated symptoms: back pain    Associated symptoms: no fever and no neck pain        Prior to Admission Medications   Prescriptions Last Dose Informant Patient Reported? Taking?    Naproxen Sodium 220 MG CAPS  Self Yes No   Sig: Take 440 mg by mouth 2 (two) times a day as needed   Patient not taking: Reported on 7/6/2021   aspirin (ECOTRIN LOW STRENGTH) 81 mg EC tablet  Self Yes No   Sig: Take 81 mg by mouth daily   Patient not taking: Reported on 7/6/2021      Facility-Administered Medications: None       Past Medical History:   Diagnosis Date    No pertinent past medical history        Past Surgical History:   Procedure Laterality Date    ANKLE SURGERY      CHEST WALL BIOPSY Left 5/13/2022    Procedure: EXCISION  BIOPSY LEFT CHEST WALL SOFT TISSUE MASS;  Surgeon: Mariely Fraga MD;  [facility];  Service: General    MASS EXCISION Right 5/13/2022    Procedure: EXCISION BIOPSY TISSUE LESION/MASS LOWER EXTREMITY RIGHT POSTERIOR THIGH;  Surgeon: Mariely Fraga MD;  [facility];  Service: General    ROTATOR CUFF REPAIR Right     THYROIDECTOMY, PARTIAL      TUBAL LIGATION      VARICOSE VEIN SURGERY         Family History   Problem Relation Age of Onset    No Known Problems Mother     Diabetes Father     Lymphoma Sister     No Known Problems Daughter     No Known Problems Sister     Lymphoma Daughter     No Known Problems Daughter     No Known Problems Maternal Aunt      I have reviewed and agree with the history as documented  E-Cigarette/Vaping    E-Cigarette Use Never User      E-Cigarette/Vaping Substances    Nicotine No     THC No     CBD No     Flavoring No     Other No     Unknown No      Social History     Tobacco Use    Smoking status: Never Smoker    Smokeless tobacco: Never Used   Vaping Use    Vaping Use: Never used   Substance Use Topics    Alcohol use: Not Currently    Drug use: Not Currently       Review of Systems   Constitutional: Negative for chills, diaphoresis and fever  HENT: Negative for congestion, ear pain, rhinorrhea and sore throat  Respiratory: Negative for cough, chest tightness, shortness of breath and wheezing  Cardiovascular: Negative for chest pain, palpitations and leg swelling     Gastrointestinal: Negative for abdominal pain, constipation, diarrhea, nausea and vomiting  Genitourinary: Negative for dysuria, flank pain, frequency and hematuria  Musculoskeletal: Positive for back pain  Negative for neck pain and neck stiffness  Skin: Negative for color change, pallor, rash and wound  Allergic/Immunologic: Negative for immunocompromised state  Neurological: Negative for dizziness, syncope, weakness, light-headedness, numbness and headaches  Hematological: Negative for adenopathy  Does not bruise/bleed easily  Psychiatric/Behavioral: Negative for confusion and decreased concentration  All other systems reviewed and are negative  Physical Exam  Physical Exam  Vitals and nursing note reviewed  Constitutional:       General: She is not in acute distress  Appearance: Normal appearance  She is well-developed  She is not ill-appearing, toxic-appearing or diaphoretic  HENT:      Head: Normocephalic and atraumatic  Right Ear: External ear normal       Left Ear: External ear normal       Mouth/Throat:      Comments: Orpharyngeal exam deferred at this time due to risk of exposure to COVID-19 during current pandemic  Patient has no oropharyngeal complaints  Eyes:      Extraocular Movements: Extraocular movements intact  Conjunctiva/sclera: Conjunctivae normal    Neck:      Vascular: No JVD  Cardiovascular:      Rate and Rhythm: Normal rate and regular rhythm  Pulses: Normal pulses  Heart sounds: Normal heart sounds  No murmur heard  No friction rub  No gallop  Pulmonary:      Effort: Pulmonary effort is normal  No respiratory distress  Breath sounds: Normal breath sounds  No wheezing, rhonchi or rales  Abdominal:      General: There is no distension  Palpations: Abdomen is soft  Tenderness: There is no abdominal tenderness  There is no guarding or rebound  Musculoskeletal:         General: No swelling or tenderness  Normal range of motion        Cervical back: Normal range of motion and neck supple  No rigidity  Right lower leg: No edema  Left lower leg: No edema  Comments: No midline spine tenderness  No reproducible tenderness in the right upper back, shoulder blade or paravertebral region  Skin:     General: Skin is warm and dry  Coloration: Skin is not pale  Findings: No erythema or rash  Neurological:      General: No focal deficit present  Mental Status: She is alert and oriented to person, place, and time  Sensory: No sensory deficit  Motor: No weakness     Psychiatric:         Mood and Affect: Mood normal          Behavior: Behavior normal          Vital Signs  ED Triage Vitals   Temperature Pulse Respirations Blood Pressure SpO2   05/16/22 1446 05/16/22 1446 05/16/22 1446 05/16/22 1446 05/16/22 1446   98 5 °F (36 9 °C) 74 18 117/62 99 %      Temp Source Heart Rate Source Patient Position - Orthostatic VS BP Location FiO2 (%)   05/16/22 1446 05/16/22 1446 05/16/22 1446 05/16/22 1446 --   Oral Monitor Sitting Left arm       Pain Score       05/16/22 1618       No Pain         Vitals:    05/16/22 1446   BP: 117/62   BP Location: Left arm   Pulse: 74   Resp: 18   Temp: 98 5 °F (36 9 °C)   TempSrc: Oral   SpO2: 99%       Visual Acuity      ED Medications  Medications   lidocaine (LIDODERM) 5 % patch 1 patch (1 patch Topical Medication Applied 5/16/22 1617)   ketorolac (TORADOL) injection 15 mg (15 mg Intravenous Not Given 5/16/22 1618)       Diagnostic Studies  Results Reviewed     Procedure Component Value Units Date/Time    HS Troponin 0hr (reflex protocol) [[id number]]  (Normal) Collected: 05/16/22 1617    Lab Status: Final result Specimen: Blood from Arm, Right Updated: 05/16/22 1651     hs TnI 0hr <2 ng/L     Basic metabolic panel [[id number]] Collected: 05/16/22 1617    Lab Status: Final result Specimen: Blood from Arm, Right Updated: 05/16/22 1642     Sodium 140 mmol/L      Potassium 3 9 mmol/L      Chloride 103 mmol/L      CO2 29 mmol/L      ANION GAP 8 mmol/L      BUN 20 mg/dL      Creatinine 0 78 mg/dL      Glucose 83 mg/dL      Calcium 9 2 mg/dL      eGFR 82 ml/min/1 73sq m     Narrative:      [place] guidelines for Chronic Kidney Disease (CKD):     Stage 1 with normal or high GFR (GFR > 90 mL/min/1 73 square meters)    Stage 2 Mild CKD (GFR = 60-89 mL/min/1 73 square meters)    Stage 3A Moderate CKD (GFR = 45-59 mL/min/1 73 square meters)    Stage 3B Moderate CKD (GFR = 30-44 mL/min/1 73 square meters)    Stage 4 Severe CKD (GFR = 15-29 mL/min/1 73 square meters)    Stage 5 End Stage CKD (GFR <15 mL/min/1 73 square meters)  Note: GFR calculation is accurate only with a steady state creatinine    Hepatic function panel [[id number]]  (Abnormal) Collected: 05/16/22 1617    Lab Status: Final result Specimen: Blood from Arm, Right Updated: 05/16/22 1642     Total Bilirubin 1 08 mg/dL      Bilirubin, Direct 0 17 mg/dL      Alkaline Phosphatase 92 U/L      AST 11 U/L      ALT 15 U/L      Total Protein 7 5 g/dL      Albumin 4 0 g/dL     D-Dimer [[id number]]  (Normal) Collected: 05/16/22 1617    Lab Status: Final result Specimen: Blood from Arm, Right Updated: 05/16/22 1638     D-Dimer, Quant 0 35 ug/ml FEU     Narrative: In the evaluation for possible pulmonary embolism, in the appropriate (Well's Score of 4 or less) patient, the age adjusted d-dimer cutoff for this patient can be calculated as:    Age x 0 01 (in ug/mL) for Age-adjusted D-dimer exclusion threshold for a patient over 50 years      CBC and differential [[id number]]  (Abnormal) Collected: 05/16/22 1617    Lab Status: Final result Specimen: Blood from Arm, Right Updated: 05/16/22 1625     WBC 7 32 Thousand/uL      RBC 4 43 Million/uL      Hemoglobin 13 2 g/dL      Hematocrit 41 9 %      MCV 95 fL      MCH 29 8 pg      MCHC 31 5 g/dL      RDW 12 9 %      MPV 11 5 fL      Platelets 022 Thousands/uL      nRBC 0 /100 WBCs      Neutrophils Relative 57 % Immat GRANS % 0 %      Lymphocytes Relative 32 %      Monocytes Relative 8 %      Eosinophils Relative 1 %      Basophils Relative 2 %      Neutrophils Absolute 4 20 Thousands/µL      Immature Grans Absolute 0 02 Thousand/uL      Lymphocytes Absolute 2 34 Thousands/µL      Monocytes Absolute 0 55 Thousand/µL      Eosinophils Absolute 0 09 Thousand/µL      Basophils Absolute 0 12 Thousands/µL                  XR chest 2 views   ED Interpretation by Natalia Wray DO (05/16 3239)   No acute abnormality in the chest                  Procedures  ECG 12 Lead Documentation Only    Date/Time: 5/16/2022 4:09 PM  Performed by: Natalia Wray DO  Authorized by: Natalia Wray DO     ECG reviewed by me, the ED Provider: yes    Patient location:  ED  Previous ECG:     Previous ECG:  Unavailable  Interpretation:     Interpretation: normal    Rate:     ECG rate:  61    ECG rate assessment: normal    Rhythm:     Rhythm: sinus rhythm    Ectopy:     Ectopy: none    QRS:     QRS axis:  Normal    QRS intervals:  Normal  Conduction:     Conduction: normal    ST segments:     ST segments:  Normal  T waves:     T waves: normal               ED Course  ED Course as of 05/16/22 1713   Mon May 16, 2022   1656 D-Dimer, Quant: 0 35   1656 hs TnI 0hr: <2   2802 Patient's pain is been constant since awaking this morning, well over 3 hours ago therefore 1 negative troponin sufficient to rule out ACS  1657 D-dimer is negative, making acute PE much less likely  Most likely patient has musculoskeletal pain and will recommend ibuprofen, over-the-counter lidocaine patches and heating pad as needed  Discussed ED return parameters                         [address]  Rule for PE    Flowsheet Row Most Recent Value   PERC Rule for PE    Age >=50 1 Filed at: 05/16/2022 1548   HR >=100 0 Filed at: 05/16/2022 1548   O2 Sat on room air < 95% 0 Filed at: 05/16/2022 1548   History of PE or DVT 1 Filed at: 05/16/2022 1548   Recent trauma or surgery 1 Filed at: 05/16/2022 1548   Hemoptysis 0 Filed at: 05/16/2022 1548   Exogenous estrogen 0 Filed at: 05/16/2022 1548   Unilateral leg swelling 0 Filed at: 05/16/2022 1548   PERC Rule for PE Results 3 Filed at: 05/16/2022 1548                  Wells' Criteria for PE    Flowsheet Row Most Recent Value   Wells' Criteria for PE    Clinical signs and symptoms of DVT 0 Filed at: 05/16/2022 1548   PE is primary diagnosis or equally likely 0 Filed at: 05/16/2022 1548   HR >100 0 Filed at: 05/16/2022 1548   Immobilization at least 3 days or Surgery in the previous 4 weeks 1 5 Filed at: 05/16/2022 1548   Previous, objectively diagnosed PE or DVT 1 5 Filed at: 05/16/2022 1548   Hemoptysis 0 Filed at: 05/16/2022 1548   Malignancy with treatment within 6 months or palliative 0 Filed at: 05/16/2022 1548   Kenyatta Morgan Criteria Total 3 Filed at: 05/16/2022 1548                [facility]  Number of Diagnoses or Management Options  Diagnosis management comments: 27-year-old female presents to the emergency department for pleuritic pain in her right upper back/shoulder blade region  Differential includes musculoskeletal pain or strain, pinched nerve, pleurisy, pneumonia or bronchitis, PE, ACS  Patient particularly concerned about PE given recent surgery  Will work up with d-dimer and cardiac labs, EKG, CXR  Will provide Toradol and Lidocaine patch for suspected MSK pain         Amount and/or Complexity of Data Reviewed  Clinical lab tests: ordered and reviewed  Tests in the radiology section of CPT®: ordered and reviewed  Tests in the medicine section of CPT®: reviewed and ordered  Independent visualization of images, tracings, or specimens: yes        Disposition  Final diagnoses:   Upper back pain on right side     Time reflects when diagnosis was documented in both MDM as applicable and the Disposition within this note     Time User Action Codes Description Comment    5/16/2022  5:12 PM Danny RUSSO Add [M54 9] Upper back pain on right side       ED Disposition     ED Disposition   Discharge    Condition   Stable    Date/Time   Mon May 16, 2022  5:12 PM    Comment   Samantha Mendoza discharge to home/self care  Follow-up Information     Follow up With Specialties Details Why Contact Info Additional Luz Maria Mao 12, DO Internal Medicine Schedule an appointment as soon as possible for a visit   [address] Emergency Department Emergency Medicine Go to  If symptoms worsen [address] Emergency Department, [address] [place] [address], [facility]          Patient's Medications   Discharge Prescriptions    No medications on file       No discharge procedures on file      PDMP Review     None          ED Provider  Electronically Signed by           Rachna Bourgeois DO  05/16/22 4852

## 2022-05-16 NOTE — LETTER
May 16, 2022     Patient: Dave Camargo   YOB: 1960   Date of Visit: 5/16/2022       To Whom it May Concern:    Dave Camargo was seen in my clinic on 5/16/2022  She may return to work on 5/17/2022  If you have any questions or concerns, please don't hesitate to call           Sincerely,          TOMÁS Ortiz        CC: No Recipients

## 2022-05-16 NOTE — Clinical Note
Timbo Gee was seen and treated in our emergency department on 5/16/2022  No restrictions            Diagnosis:     Chavo Garnett  may return to work on return date  She may return on this date: 05/17/2022         If you have any questions or concerns, please don't hesitate to call        Steve Fregoso DO    ______________________________           _______________          _______________  Hospital Representative                              Date                                Time

## 2022-05-16 NOTE — PROGRESS NOTES
[facility]        NAME: Romie Ribera is a 62 y o  female  : 1960    MRN: [id number]  DATE: May 16, 2022  TIME: 1:43 PM    Assessment and Plan   Acute pain of right shoulder [M25 511]  1  Acute pain of right shoulder           Patient Instructions     Call surgeon to make aware of your symptoms  Unable to rule out PT/DVT here  Proceed to ER to rule out DVT/PE if that is what the surgeon recommends  Chief Complaint     Chief Complaint   Patient presents with    Back Pain     Started today, pt states she had recent surgery (cyst removal) and is concerned for a blood clot, has been having sharp pain in her back behind the right shoulder          History of Present Illness       Patient presents today with sudden sharp pain to R shoulder blade area that started this morning  She states the pain comes on suddenly when she talks, coughs, or takes deep breaths  She denies SOB, chest pain  Did not take anything for the pain  She recently had surgery for removal of lipomas on her thigh and neck area, and is concerned for DVT/PE  She did not call her surgeon to make aware of symptoms  Review of Systems   Review of Systems   Constitutional: Negative for activity change, appetite change, chills and fever  Respiratory: Negative for cough and shortness of breath  Cardiovascular: Negative for chest pain and palpitations  Gastrointestinal: Negative for abdominal pain, anal bleeding, blood in stool, constipation, diarrhea and nausea  Musculoskeletal: Positive for arthralgias (sudden sharp R scapular pain with talking, coughing, taking deep breaths  )  Negative for back pain, joint swelling and myalgias  Skin: Negative for color change, rash and wound  Neurological: Negative for weakness, light-headedness, numbness and headaches  All other systems reviewed and are negative          Current Medications       Current Outpatient Medications:     aspirin (ECOTRIN LOW STRENGTH) 81 mg EC tablet, Take 81 mg by mouth daily (Patient not taking: Reported on 7/6/2021), Disp: , Rfl:     Naproxen Sodium 220 MG CAPS, Take 440 mg by mouth 2 (two) times a day as needed (Patient not taking: Reported on 7/6/2021), Disp: , Rfl:     Current Allergies     Allergies as of 05/16/2022    (No Known Allergies)            The following portions of the patient's history were reviewed and updated as appropriate: allergies, current medications, past family history, past medical history, past social history, past surgical history and problem list      Past Medical History:   Diagnosis Date    No pertinent past medical history        Past Surgical History:   Procedure Laterality Date    ANKLE SURGERY      CHEST WALL BIOPSY Left 5/13/2022    Procedure: EXCISION  BIOPSY LEFT CHEST WALL SOFT TISSUE MASS;  Surgeon: Carson Deluca MD;  [facility];  Service: General    MASS EXCISION Right 5/13/2022    Procedure: EXCISION BIOPSY TISSUE LESION/MASS LOWER EXTREMITY RIGHT POSTERIOR THIGH;  Surgeon: Carson Deluca MD;  [facility];  Service: General    ROTATOR CUFF REPAIR Right     THYROIDECTOMY, PARTIAL      TUBAL LIGATION      VARICOSE VEIN SURGERY         Family History   Problem Relation Age of Onset    No Known Problems Mother     Diabetes Father     Lymphoma Sister     No Known Problems Daughter     No Known Problems Sister     Lymphoma Daughter     No Known Problems Daughter     No Known Problems Maternal Aunt          Medications have been verified  Objective   /70   Pulse 63   Temp 97 7 °F (36 5 °C) (Temporal)   Resp 18   Ht 5' 9" (1 753 m)   Wt 88 kg (194 lb)   SpO2 100%   BMI 28 65 kg/m²        Physical Exam     Physical Exam  Vitals and nursing note reviewed  Constitutional:       General: She is not in acute distress  Appearance: Normal appearance  She is not ill-appearing     HENT:      Right Ear: Tympanic membrane and ear canal normal       Left Ear: Tympanic membrane and ear canal normal       Nose: Nose normal       Mouth/Throat:      Mouth: Mucous membranes are moist    Eyes:      Conjunctiva/sclera: Conjunctivae normal       Pupils: Pupils are equal, round, and reactive to light  Cardiovascular:      Rate and Rhythm: Normal rate and regular rhythm  Pulses: Normal pulses  Heart sounds: Normal heart sounds  No murmur heard  Pulmonary:      Effort: Pulmonary effort is normal  No tachypnea, accessory muscle usage, prolonged expiration, respiratory distress or retractions  Breath sounds: Normal breath sounds  No decreased air movement  No decreased breath sounds, wheezing, rhonchi or rales  Abdominal:      General: Bowel sounds are normal    Musculoskeletal:         General: Normal range of motion  Right shoulder: No swelling, deformity, tenderness or bony tenderness  Normal range of motion  Normal strength  Normal pulse  Left shoulder: Normal       Cervical back: Normal range of motion  Comments: Sudden sharp R shoulder pain when coughing, talking, taking deep breaths  Pain is not reproducible and does not radiate anywhere  5/5  strength  Able to perform ROM without pain  Skin:     General: Skin is warm  Neurological:      Mental Status: She is alert and oriented to person, place, and time  Comments: Equal sensation to BL arms, denies numbness, tingling  Psychiatric:         Mood and Affect: Mood normal          Behavior: Behavior normal          Thought Content:  Thought content normal          Judgment: Judgment normal

## 2022-05-16 NOTE — PATIENT INSTRUCTIONS
Call surgeon to make aware of your symptoms  Unable to rule out PT/DVT here  Proceed to ER to rule out DVT/PE if that is what the surgeon recommends

## 2022-05-17 ENCOUNTER — TELEPHONE (OUTPATIENT)
Dept: OTHER | Facility: OTHER | Age: 62
End: 2022-05-17

## 2022-05-17 NOTE — TELEPHONE ENCOUNTER
Is not able to make her 8am appt on 5/27   Calling to see if she is able to move it later she drops her granddaughter to the bus stop at 8am

## 2022-05-18 NOTE — TELEPHONE ENCOUNTER
Pt called and detailed message was left informing pt the time of appointment was changed and if the time was not good for her to call us back to r/s

## 2022-05-27 ENCOUNTER — OFFICE VISIT (OUTPATIENT)
Dept: SURGERY | Facility: CLINIC | Age: 62
End: 2022-05-27

## 2022-05-27 VITALS
SYSTOLIC BLOOD PRESSURE: 118 MMHG | BODY MASS INDEX: 28.73 KG/M2 | DIASTOLIC BLOOD PRESSURE: 78 MMHG | OXYGEN SATURATION: 99 % | HEART RATE: 66 BPM | WEIGHT: 194 LBS | HEIGHT: 69 IN

## 2022-05-27 DIAGNOSIS — L72.3 SEBACEOUS CYST: Primary | Chronic | ICD-10-CM

## 2022-05-27 DIAGNOSIS — D17.23 LIPOMA OF RIGHT LOWER EXTREMITY: Chronic | ICD-10-CM

## 2022-05-27 PROCEDURE — 99024 POSTOP FOLLOW-UP VISIT: CPT | Performed by: SURGERY

## 2022-05-27 NOTE — PROGRESS NOTES
Post-Op Follow Up- General Surgery   Ed Bacilio 62 y o  female MRN: [id number]  Unit/Bed#:  Encounter: [id number]    Assessment/Plan     Assessment:  Status post excision sebaceous cyst from left shoulder, improved  Status post excision lipoma right posterior thigh, improved  Plan:  Patient did well after surgery, no complaints at this time  She is discharged from my care and I will be glad to see her if any problem arises in the future  History of Present Illness     HPI:  Ed Bacilio is a 62 y o  female who presents to my office for 1st postop follow-up after excision of sebaceous cyst from the left shoulder and excision of lipoma from the right posterior thigh from May 13  She offers no complaints at this time  Pathology discussed with patient        Historical Information   Past Medical History:   Diagnosis Date    No pertinent past medical history      Past Surgical History:   Procedure Laterality Date    ANKLE SURGERY      CHEST WALL BIOPSY Left 5/13/2022    Procedure: EXCISION  BIOPSY LEFT CHEST WALL SOFT TISSUE MASS;  Surgeon: Jacques Pierre MD;  [facility];  Service: General    MASS EXCISION Right 5/13/2022    Procedure: EXCISION BIOPSY TISSUE LESION/MASS LOWER EXTREMITY RIGHT POSTERIOR THIGH;  Surgeon: Jacques Pierre MD;  [facility];  Service: General    ROTATOR CUFF REPAIR Right     THYROIDECTOMY, PARTIAL      TUBAL LIGATION      VARICOSE VEIN SURGERY       Social History   Social History     Substance and Sexual Activity   Alcohol Use Not Currently     Social History     Substance and Sexual Activity   Drug Use Not Currently     Social History     Tobacco Use   Smoking Status Never Smoker   Smokeless Tobacco Never Used     Family History: non-contributory    Meds/Allergies   all medications and allergies reviewed     Current Outpatient Medications:     aspirin (ECOTRIN LOW STRENGTH) 81 mg EC tablet, Take 81 mg by mouth daily (Patient not taking: No sig reported), Disp: , Rfl:     Naproxen Sodium 220 MG CAPS, Take 440 mg by mouth 2 (two) times a day as needed (Patient not taking: No sig reported), Disp: , Rfl:   No Known Allergies    Objective     Current Vitals:   Blood Pressure: 118/78 (05/27/22 1420)  Pulse: 66 (05/27/22 1420)  Height: 5' 9" (175 3 cm) (05/27/22 1420)  Weight - Scale: 88 kg (194 lb) (05/27/22 1420)  SpO2: 99 % (05/27/22 1420)    Physical Exam  Vitals and nursing note reviewed  Skin:     Comments: Incision from the left shoulder is well-healed without evidence of infection  Incision from the right posterior thigh it is well-healed without evidence of infection

## 2022-07-06 ENCOUNTER — APPOINTMENT (OUTPATIENT)
Dept: LAB | Facility: CLINIC | Age: 62
End: 2022-07-06
Payer: COMMERCIAL

## 2022-07-06 DIAGNOSIS — Z13.6 SCREENING FOR ISCHEMIC HEART DISEASE: ICD-10-CM

## 2022-07-06 DIAGNOSIS — R53.83 FATIGUE, UNSPECIFIED TYPE: ICD-10-CM

## 2022-07-06 DIAGNOSIS — R94.6 NONSPECIFIC ABNORMAL RESULTS OF THYROID FUNCTION STUDY: ICD-10-CM

## 2022-07-06 LAB
ALBUMIN SERPL BCP-MCNC: 3.5 G/DL (ref 3.5–5)
ALP SERPL-CCNC: 83 U/L (ref 46–116)
ALT SERPL W P-5'-P-CCNC: 18 U/L (ref 12–78)
ANION GAP SERPL CALCULATED.3IONS-SCNC: 4 MMOL/L (ref 4–13)
AST SERPL W P-5'-P-CCNC: 13 U/L (ref 5–45)
BILIRUB SERPL-MCNC: 0.79 MG/DL (ref 0.2–1)
BUN SERPL-MCNC: 14 MG/DL (ref 5–25)
CALCIUM SERPL-MCNC: 8.8 MG/DL (ref 8.3–10.1)
CHLORIDE SERPL-SCNC: 107 MMOL/L (ref 100–108)
CHOLEST SERPL-MCNC: 150 MG/DL
CO2 SERPL-SCNC: 27 MMOL/L (ref 21–32)
CREAT SERPL-MCNC: 0.68 MG/DL (ref 0.6–1.3)
ERYTHROCYTE [DISTWIDTH] IN BLOOD BY AUTOMATED COUNT: 12.9 % (ref 11.6–15.1)
GFR SERPL CREATININE-BSD FRML MDRD: 95 ML/MIN/1.73SQ M
GLUCOSE P FAST SERPL-MCNC: 91 MG/DL (ref 65–99)
HCT VFR BLD AUTO: 40.1 % (ref 34.8–46.1)
HDLC SERPL-MCNC: 57 MG/DL
HGB BLD-MCNC: 12.8 G/DL (ref 11.5–15.4)
LDLC SERPL CALC-MCNC: 84 MG/DL (ref 0–100)
MCH RBC QN AUTO: 30.3 PG (ref 26.8–34.3)
MCHC RBC AUTO-ENTMCNC: 31.9 G/DL (ref 31.4–37.4)
MCV RBC AUTO: 95 FL (ref 82–98)
NONHDLC SERPL-MCNC: 93 MG/DL
PLATELET # BLD AUTO: 232 THOUSANDS/UL (ref 149–390)
PMV BLD AUTO: 11.8 FL (ref 8.9–12.7)
POTASSIUM SERPL-SCNC: 4.2 MMOL/L (ref 3.5–5.3)
PROT SERPL-MCNC: 7.1 G/DL (ref 6.4–8.2)
RBC # BLD AUTO: 4.22 MILLION/UL (ref 3.81–5.12)
SODIUM SERPL-SCNC: 138 MMOL/L (ref 136–145)
T4 SERPL-MCNC: 9.4 UG/DL (ref 4.7–13.3)
TRIGL SERPL-MCNC: 43 MG/DL
TSH SERPL DL<=0.05 MIU/L-ACNC: 0.47 UIU/ML (ref 0.45–4.5)
WBC # BLD AUTO: 6.41 THOUSAND/UL (ref 4.31–10.16)

## 2022-07-06 PROCEDURE — 80053 COMPREHEN METABOLIC PANEL: CPT

## 2022-07-06 PROCEDURE — 36415 COLL VENOUS BLD VENIPUNCTURE: CPT

## 2022-07-06 PROCEDURE — 85027 COMPLETE CBC AUTOMATED: CPT

## 2022-07-06 PROCEDURE — 80061 LIPID PANEL: CPT

## 2022-07-06 PROCEDURE — 84443 ASSAY THYROID STIM HORMONE: CPT

## 2022-07-06 PROCEDURE — 84436 ASSAY OF TOTAL THYROXINE: CPT

## 2022-07-15 ENCOUNTER — APPOINTMENT (OUTPATIENT)
Dept: LAB | Facility: HOSPITAL | Age: 62
End: 2022-07-15
Attending: INTERNAL MEDICINE
Payer: COMMERCIAL

## 2022-07-15 DIAGNOSIS — N39.0 URINARY TRACT INFECTION WITHOUT HEMATURIA, SITE UNSPECIFIED: ICD-10-CM

## 2022-07-15 PROCEDURE — 87086 URINE CULTURE/COLONY COUNT: CPT

## 2022-07-15 PROCEDURE — 87186 SC STD MICRODIL/AGAR DIL: CPT

## 2022-07-15 PROCEDURE — 87077 CULTURE AEROBIC IDENTIFY: CPT

## 2022-07-17 LAB — BACTERIA UR CULT: ABNORMAL

## 2022-09-13 ENCOUNTER — APPOINTMENT (OUTPATIENT)
Dept: LAB | Facility: CLINIC | Age: 62
End: 2022-09-13

## 2022-09-13 DIAGNOSIS — Z00.8 HEALTH EXAMINATION IN POPULATION SURVEY: ICD-10-CM

## 2022-09-13 LAB
CHOLEST SERPL-MCNC: 156 MG/DL
EST. AVERAGE GLUCOSE BLD GHB EST-MCNC: 114 MG/DL
HBA1C MFR BLD: 5.6 %
HDLC SERPL-MCNC: 58 MG/DL
LDLC SERPL CALC-MCNC: 83 MG/DL (ref 0–100)
NONHDLC SERPL-MCNC: 98 MG/DL
TRIGL SERPL-MCNC: 73 MG/DL

## 2022-09-13 PROCEDURE — 80061 LIPID PANEL: CPT

## 2022-09-13 PROCEDURE — 36415 COLL VENOUS BLD VENIPUNCTURE: CPT

## 2022-09-13 PROCEDURE — 83036 HEMOGLOBIN GLYCOSYLATED A1C: CPT

## 2022-10-03 ENCOUNTER — APPOINTMENT (OUTPATIENT)
Dept: LAB | Facility: CLINIC | Age: 62
End: 2022-10-03
Payer: COMMERCIAL

## 2022-10-03 DIAGNOSIS — M54.50 LOW BACK PAIN, UNSPECIFIED BACK PAIN LATERALITY, UNSPECIFIED CHRONICITY, UNSPECIFIED WHETHER SCIATICA PRESENT: ICD-10-CM

## 2022-10-03 LAB
ANION GAP SERPL CALCULATED.3IONS-SCNC: 2 MMOL/L (ref 4–13)
BILIRUB UR QL STRIP: NEGATIVE
BUN SERPL-MCNC: 16 MG/DL (ref 5–25)
CALCIUM SERPL-MCNC: 8.7 MG/DL (ref 8.3–10.1)
CHLORIDE SERPL-SCNC: 106 MMOL/L (ref 96–108)
CLARITY UR: CLEAR
CO2 SERPL-SCNC: 28 MMOL/L (ref 21–32)
COLOR UR: COLORLESS
CREAT SERPL-MCNC: 0.72 MG/DL (ref 0.6–1.3)
GFR SERPL CREATININE-BSD FRML MDRD: 91 ML/MIN/1.73SQ M
GLUCOSE SERPL-MCNC: 87 MG/DL (ref 65–140)
GLUCOSE UR STRIP-MCNC: NEGATIVE MG/DL
HGB UR QL STRIP.AUTO: NEGATIVE
KETONES UR STRIP-MCNC: NEGATIVE MG/DL
LEUKOCYTE ESTERASE UR QL STRIP: NEGATIVE
NITRITE UR QL STRIP: NEGATIVE
PH UR STRIP.AUTO: 6 [PH]
POTASSIUM SERPL-SCNC: 3.9 MMOL/L (ref 3.5–5.3)
PROT UR STRIP-MCNC: NEGATIVE MG/DL
SODIUM SERPL-SCNC: 136 MMOL/L (ref 135–147)
SP GR UR STRIP.AUTO: 1.01 (ref 1–1.03)
UROBILINOGEN UR STRIP-ACNC: <2 MG/DL

## 2022-10-03 PROCEDURE — 81003 URINALYSIS AUTO W/O SCOPE: CPT

## 2022-10-03 PROCEDURE — 36415 COLL VENOUS BLD VENIPUNCTURE: CPT

## 2022-10-03 PROCEDURE — 80048 BASIC METABOLIC PNL TOTAL CA: CPT

## 2022-11-08 ENCOUNTER — APPOINTMENT (OUTPATIENT)
Dept: LAB | Facility: CLINIC | Age: 62
End: 2022-11-08

## 2022-11-08 DIAGNOSIS — Z01.419 ROUTINE GYNECOLOGICAL EXAMINATION: ICD-10-CM

## 2022-11-13 LAB
LAB AP GYN PRIMARY INTERPRETATION: NORMAL
Lab: NORMAL

## 2022-11-18 ENCOUNTER — HOSPITAL ENCOUNTER (OUTPATIENT)
Dept: MAMMOGRAPHY | Facility: HOSPITAL | Age: 62
End: 2022-11-18
Attending: SPECIALIST

## 2022-11-18 VITALS — HEIGHT: 69 IN | BODY MASS INDEX: 28.73 KG/M2 | WEIGHT: 194 LBS

## 2022-11-18 DIAGNOSIS — Z12.31 ENCOUNTER FOR SCREENING MAMMOGRAM FOR MALIGNANT NEOPLASM OF BREAST: ICD-10-CM

## 2023-03-16 ENCOUNTER — APPOINTMENT (OUTPATIENT)
Dept: LAB | Facility: CLINIC | Age: 63
End: 2023-03-16

## 2023-03-16 ENCOUNTER — APPOINTMENT (OUTPATIENT)
Dept: RADIOLOGY | Facility: CLINIC | Age: 63
End: 2023-03-16

## 2023-03-16 DIAGNOSIS — R53.81 DEBILITY: ICD-10-CM

## 2023-03-16 DIAGNOSIS — R05.1 ACUTE COUGH: ICD-10-CM

## 2023-03-16 LAB
ALBUMIN SERPL BCP-MCNC: 3.9 G/DL (ref 3.5–5)
ALP SERPL-CCNC: 74 U/L (ref 46–116)
ALT SERPL W P-5'-P-CCNC: 22 U/L (ref 12–78)
ANION GAP SERPL CALCULATED.3IONS-SCNC: 3 MMOL/L (ref 4–13)
AST SERPL W P-5'-P-CCNC: 15 U/L (ref 5–45)
BASOPHILS # BLD AUTO: 0.08 THOUSANDS/ÂΜL (ref 0–0.1)
BASOPHILS NFR BLD AUTO: 1 % (ref 0–1)
BILIRUB SERPL-MCNC: 1.58 MG/DL (ref 0.2–1)
BUN SERPL-MCNC: 13 MG/DL (ref 5–25)
CALCIUM SERPL-MCNC: 9.2 MG/DL (ref 8.3–10.1)
CHLORIDE SERPL-SCNC: 105 MMOL/L (ref 96–108)
CO2 SERPL-SCNC: 29 MMOL/L (ref 21–32)
CREAT SERPL-MCNC: 0.72 MG/DL (ref 0.6–1.3)
EOSINOPHIL # BLD AUTO: 0.02 THOUSAND/ÂΜL (ref 0–0.61)
EOSINOPHIL NFR BLD AUTO: 0 % (ref 0–6)
ERYTHROCYTE [DISTWIDTH] IN BLOOD BY AUTOMATED COUNT: 12.6 % (ref 11.6–15.1)
GFR SERPL CREATININE-BSD FRML MDRD: 90 ML/MIN/1.73SQ M
GLUCOSE SERPL-MCNC: 99 MG/DL (ref 65–140)
HCT VFR BLD AUTO: 40.4 % (ref 34.8–46.1)
HGB BLD-MCNC: 12.7 G/DL (ref 11.5–15.4)
IMM GRANULOCYTES # BLD AUTO: 0.03 THOUSAND/UL (ref 0–0.2)
IMM GRANULOCYTES NFR BLD AUTO: 0 % (ref 0–2)
LYMPHOCYTES # BLD AUTO: 1.37 THOUSANDS/ÂΜL (ref 0.6–4.47)
LYMPHOCYTES NFR BLD AUTO: 18 % (ref 14–44)
MCH RBC QN AUTO: 29.2 PG (ref 26.8–34.3)
MCHC RBC AUTO-ENTMCNC: 31.4 G/DL (ref 31.4–37.4)
MCV RBC AUTO: 93 FL (ref 82–98)
MONOCYTES # BLD AUTO: 0.75 THOUSAND/ÂΜL (ref 0.17–1.22)
MONOCYTES NFR BLD AUTO: 10 % (ref 4–12)
NEUTROPHILS # BLD AUTO: 5.56 THOUSANDS/ÂΜL (ref 1.85–7.62)
NEUTS SEG NFR BLD AUTO: 71 % (ref 43–75)
NRBC BLD AUTO-RTO: 0 /100 WBCS
PLATELET # BLD AUTO: 233 THOUSANDS/UL (ref 149–390)
PMV BLD AUTO: 11.7 FL (ref 8.9–12.7)
POTASSIUM SERPL-SCNC: 3.4 MMOL/L (ref 3.5–5.3)
PROT SERPL-MCNC: 7.3 G/DL (ref 6.4–8.4)
RBC # BLD AUTO: 4.35 MILLION/UL (ref 3.81–5.12)
SODIUM SERPL-SCNC: 137 MMOL/L (ref 135–147)
TSH SERPL DL<=0.05 MIU/L-ACNC: 0.13 UIU/ML (ref 0.45–4.5)
WBC # BLD AUTO: 7.81 THOUSAND/UL (ref 4.31–10.16)

## 2023-03-17 ENCOUNTER — HOSPITAL ENCOUNTER (OUTPATIENT)
Dept: ULTRASOUND IMAGING | Facility: HOSPITAL | Age: 63
End: 2023-03-17

## 2023-03-17 DIAGNOSIS — R94.5 NONSPECIFIC ABNORMAL RESULTS OF LIVER FUNCTION STUDY: ICD-10-CM

## 2023-03-17 DIAGNOSIS — R94.6 NONSPECIFIC ABNORMAL RESULTS OF THYROID FUNCTION STUDY: ICD-10-CM

## 2023-03-17 DIAGNOSIS — R10.9 ABDOMINAL PAIN, UNSPECIFIED ABDOMINAL LOCATION: ICD-10-CM

## 2023-03-21 ENCOUNTER — APPOINTMENT (OUTPATIENT)
Dept: LAB | Facility: CLINIC | Age: 63
End: 2023-03-21

## 2023-03-21 DIAGNOSIS — R06.02 SHORTNESS OF BREATH: ICD-10-CM

## 2023-03-21 DIAGNOSIS — R94.6 NONSPECIFIC ABNORMAL RESULTS OF THYROID FUNCTION STUDY: ICD-10-CM

## 2023-03-21 DIAGNOSIS — E87.6 HYPOPOTASSEMIA: ICD-10-CM

## 2023-03-21 LAB
ANION GAP SERPL CALCULATED.3IONS-SCNC: 3 MMOL/L (ref 4–13)
BUN SERPL-MCNC: 17 MG/DL (ref 5–25)
CALCIUM SERPL-MCNC: 9.7 MG/DL (ref 8.3–10.1)
CARDIAC TROPONIN I PNL SERPL HS: <2 NG/L (ref 8–18)
CHLORIDE SERPL-SCNC: 107 MMOL/L (ref 96–108)
CO2 SERPL-SCNC: 28 MMOL/L (ref 21–32)
CREAT SERPL-MCNC: 0.88 MG/DL (ref 0.6–1.3)
GFR SERPL CREATININE-BSD FRML MDRD: 71 ML/MIN/1.73SQ M
GLUCOSE SERPL-MCNC: 126 MG/DL (ref 65–140)
POTASSIUM SERPL-SCNC: 3.8 MMOL/L (ref 3.5–5.3)
SODIUM SERPL-SCNC: 138 MMOL/L (ref 135–147)
T4 SERPL-MCNC: 10.4 UG/DL (ref 4.7–13.3)
TSH SERPL DL<=0.05 MIU/L-ACNC: 0.43 UIU/ML (ref 0.45–4.5)

## 2023-03-22 LAB
THYROGLOB AB SERPL-ACNC: <1 IU/ML (ref 0–0.9)
THYROPEROXIDASE AB SERPL-ACNC: 17 IU/ML (ref 0–34)

## 2023-04-24 ENCOUNTER — APPOINTMENT (OUTPATIENT)
Dept: LAB | Facility: CLINIC | Age: 63
End: 2023-04-24

## 2023-04-24 DIAGNOSIS — R79.89 LOW TSH LEVEL: ICD-10-CM

## 2023-04-24 DIAGNOSIS — Z00.8 HEALTH EXAMINATION IN POPULATION SURVEYS: ICD-10-CM

## 2023-04-24 LAB — T3 SERPL-MCNC: 1 NG/ML (ref 0.6–1.8)

## 2023-04-25 LAB
CHOLEST SERPL-MCNC: 173 MG/DL
HDLC SERPL-MCNC: 56 MG/DL
LDLC SERPL CALC-MCNC: 106 MG/DL (ref 0–100)
NONHDLC SERPL-MCNC: 117 MG/DL
T4 FREE SERPL-MCNC: 1.14 NG/DL (ref 0.76–1.46)
TRIGL SERPL-MCNC: 55 MG/DL
TSH SERPL DL<=0.05 MIU/L-ACNC: 0.98 UIU/ML (ref 0.45–4.5)

## 2023-04-26 LAB — TSI SER-ACNC: <0.1 IU/L (ref 0–0.55)

## 2023-04-29 LAB
EST. AVERAGE GLUCOSE BLD GHB EST-MCNC: 111 MG/DL
HBA1C MFR BLD: 5.5 %

## 2023-08-14 ENCOUNTER — OFFICE VISIT (OUTPATIENT)
Dept: ENDOCRINOLOGY | Facility: CLINIC | Age: 63
End: 2023-08-14
Payer: COMMERCIAL

## 2023-08-14 VITALS
SYSTOLIC BLOOD PRESSURE: 110 MMHG | HEART RATE: 56 BPM | HEIGHT: 69 IN | OXYGEN SATURATION: 97 % | WEIGHT: 197.5 LBS | TEMPERATURE: 97.8 F | DIASTOLIC BLOOD PRESSURE: 80 MMHG | RESPIRATION RATE: 16 BRPM | BODY MASS INDEX: 29.25 KG/M2

## 2023-08-14 DIAGNOSIS — R79.89 LOW TSH LEVEL: ICD-10-CM

## 2023-08-14 DIAGNOSIS — E04.1 THYROID NODULE: Primary | ICD-10-CM

## 2023-08-14 PROCEDURE — 99213 OFFICE O/P EST LOW 20 MIN: CPT | Performed by: STUDENT IN AN ORGANIZED HEALTH CARE EDUCATION/TRAINING PROGRAM

## 2023-08-14 NOTE — PROGRESS NOTES
Riki Damian 65 y.o. female MRN: [id number]    Encounter: [id number]      Assessment/Plan     1. Thyroid nodule  LEFT lower pole nodule measuring 1.6 x 1.2 x 1.7 cm with TR 3. She has history of right-sided thyroid nodule, status post partial right thyroidectomy years ago,  She has no compressive symptoms, no risk factors for thyroid cancer. She will need to repeat ultrasound in 1 year or sooner if clinically indicated otherwise. 2. history of low TSH level    History of low TSH, with normal free T4 and total T3, negative TSI. I repeated TSH after discontinuing high-dose biotin came back normal.  I do recommend to monitor TFT over time, no further management needed for now. She will follow-up with primary physician and return back if clinically indicated. CC:   Abnormal thyroid function tests    History of Present Illness     HPI:    Riki Damian is a 65year old female who presented for follow up,  She was seen and evaluated in April 2023 for thyroid nodules and low TSH,  As she was on high dose biotin, TFT repeated with biotin few days on hold for the test which showed:    Component      Latest Ref Rng 3/21/2023 4/24/2023   Cholesterol      See Comment mg/dL  173    Triglycerides      See Comment mg/dL  55    HDL      >=50 mg/dL  56    LDL Calculated      0 - 100 mg/dL  106 (H)    Non-HDL Cholesterol      mg/dl  117    TSH 3RD GENERATON      0.450 - 4.500 uIU/mL 0.432 (L)  0.981    T4 TOTAL      4.7 - 13.3 ug/dL 10.4     THYROID MICROSOMAL ANTIBODY      0 - 34 IU/mL 17     THYROGLOBULIN AB      0.0 - 0.9 IU/mL <1.0     Free T4      0.76 - 1.46 ng/dL  1.14    T3, Total      0.60 - 1.80 ng/mL  1.00    THYROID STIMULATING IMMUNOGLOBULIN      0.00 - 0.55 IU/L  <0.10         Thyroid ultrasound showed:  LEFT lower pole nodule measuring 1.6 x 1.2 x 1.7 cm with TR 3. Review of Systems   Constitutional: Negative for appetite change, fatigue and unexpected weight change.    HENT: Negative for trouble swallowing and voice change. Eyes: Negative for visual disturbance. Respiratory: Negative for cough, shortness of breath and wheezing. Cardiovascular: Negative for palpitations and leg swelling. Gastrointestinal: Negative for abdominal pain, constipation, diarrhea, nausea and vomiting. Endocrine: Negative for cold intolerance, heat intolerance, polyphagia and polyuria. Musculoskeletal: Negative for arthralgias. Skin: Negative for color change, rash and wound. Neurological: Negative for dizziness, tremors, weakness, light-headedness, numbness and headaches. Psychiatric/Behavioral: Negative for agitation and sleep disturbance. The patient is not nervous/anxious.         Historical Information   Past Medical History:   Diagnosis Date   • Fatigue    • No pertinent past medical history    • SOB (shortness of breath)      Past Surgical History:   Procedure Laterality Date   • ANKLE SURGERY     • CHEST WALL BIOPSY Left 5/13/2022    Procedure: EXCISION  BIOPSY LEFT CHEST WALL SOFT TISSUE MASS;  Surgeon: Verona Butterfield MD;  Location: [facility];  Service: General   • MASS EXCISION Right 5/13/2022    Procedure: EXCISION BIOPSY TISSUE LESION/MASS LOWER EXTREMITY RIGHT POSTERIOR THIGH;  Surgeon: Verona Butterfield MD;  Location: [facility];  Service: General   • ROTATOR CUFF REPAIR Right    • THYROIDECTOMY, PARTIAL     • TUBAL LIGATION     • VARICOSE VEIN SURGERY       Social History   Social History     Substance and Sexual Activity   Alcohol Use Not Currently     Social History     Substance and Sexual Activity   Drug Use Not Currently     Social History     Tobacco Use   Smoking Status Never   Smokeless Tobacco Never     Family History:   Family History   Problem Relation Age of Onset   • No Known Problems Mother    • Diabetes Father    • Lymphoma Sister    • No Known Problems Sister    • No Known Problems Daughter    • Lymphoma Daughter    • No Known Problems Daughter    • No Known Problems Maternal Grandmother    • No Known Problems Paternal Grandmother    • No Known Problems Maternal Aunt        Meds/Allergies   No current outpatient medications on file. No current facility-administered medications for this visit. No Known Allergies    Objective   Vitals: not currently breastfeeding. Physical Exam  Constitutional:       Appearance: She is well-developed. HENT:      Head: Normocephalic and atraumatic. Nose: Nose normal.   Eyes:      Pupils: Pupils are equal, round, and reactive to light. Neck:      Thyroid: No thyromegaly. Vascular: No JVD. Comments: Partial thyroidectomy scar, well-healed. Cardiovascular:      Rate and Rhythm: Normal rate and regular rhythm. Heart sounds: Normal heart sounds. No murmur heard. No friction rub. No gallop. Pulmonary:      Effort: Pulmonary effort is normal. No respiratory distress. Breath sounds: Normal breath sounds. No stridor. No wheezing or rales. Chest:      Chest wall: No tenderness. Abdominal:      General: Bowel sounds are normal. There is no distension. Palpations: Abdomen is soft. There is no mass. Tenderness: There is no abdominal tenderness. There is no guarding. Musculoskeletal:         General: No deformity. Normal range of motion. Cervical back: Normal range of motion. Skin:     General: Skin is warm. Neurological:      Mental Status: She is alert and oriented to person, place, and time. The history was obtained from the review of the chart, patient.     Lab Results:   Lab Results   Component Value Date/Time    TSH 3RD GENERATON 0.981 04/24/2023 08:09 AM    TSH 3RD GENERATON 0.432 (L) 03/21/2023 04:51 PM    TSH 3RD GENERATON 0.128 (L) 03/16/2023 10:01 AM    Free T4 1.14 04/24/2023 08:09 AM       Imaging Studies:   Results for orders placed during the hospital encounter of 03/17/23    US thyroid    Impression  No nodule meets current ACR criteria for requiring biopsy but followup ultrasound is recommended in 1 year. Reference: ACR Thyroid Imaging, Reporting and Data System (TI-RADS): White Paper of the [employer]. J AM Cj Radiol 0409;84:710-562. (additional recommendations based on American Thyroid Association 2015 guidelines.)    The study was marked in EPIC for significant notification. Workstation performed: [id number]      I have personally reviewed pertinent reports. Portions of the record may have been created with voice recognition software. Occasional wrong word or "sound a like" substitutions may have occurred due to the inherent limitations of voice recognition software. Read the chart carefully and recognize, using context, where substitutions have occurred.

## 2023-08-29 ENCOUNTER — APPOINTMENT (OUTPATIENT)
Dept: LAB | Facility: CLINIC | Age: 63
End: 2023-08-29
Payer: COMMERCIAL

## 2023-08-29 DIAGNOSIS — R35.0 URINARY FREQUENCY: ICD-10-CM

## 2023-08-29 PROCEDURE — 87086 URINE CULTURE/COLONY COUNT: CPT

## 2023-08-29 PROCEDURE — 87147 CULTURE TYPE IMMUNOLOGIC: CPT

## 2023-08-31 LAB
BACTERIA UR CULT: ABNORMAL
BACTERIA UR CULT: ABNORMAL

## 2023-11-14 ENCOUNTER — APPOINTMENT (OUTPATIENT)
Dept: LAB | Facility: CLINIC | Age: 63
End: 2023-11-14
Payer: COMMERCIAL

## 2023-11-14 DIAGNOSIS — Z01.419 ENCOUNTER FOR GYNECOLOGICAL EXAMINATION WITHOUT ABNORMAL FINDING: ICD-10-CM

## 2023-11-14 PROCEDURE — G0145 SCR C/V CYTO,THINLAYER,RESCR: HCPCS

## 2023-11-20 LAB
LAB AP GYN PRIMARY INTERPRETATION: NORMAL
Lab: NORMAL

## 2024-01-24 ENCOUNTER — HOSPITAL ENCOUNTER (OUTPATIENT)
Dept: MAMMOGRAPHY | Facility: HOSPITAL | Age: 64
Discharge: HOME | End: 2024-01-24
Attending: SPECIALIST
Payer: COMMERCIAL

## 2024-01-24 VITALS — WEIGHT: 197.5 LBS | BODY MASS INDEX: 29.25 KG/M2 | HEIGHT: 69 IN

## 2024-01-24 DIAGNOSIS — Z12.31 ENCOUNTER FOR SCREENING MAMMOGRAM FOR MALIGNANT NEOPLASM OF BREAST: ICD-10-CM

## 2024-01-24 PROCEDURE — 77067 SCR MAMMO BI INCL CAD: CPT

## 2024-01-24 PROCEDURE — 77063 BREAST TOMOSYNTHESIS BI: CPT

## 2024-02-21 PROBLEM — Z00.00 HEALTHCARE MAINTENANCE: Status: RESOLVED | Noted: 2020-09-03 | Resolved: 2024-02-21

## 2024-02-25 ENCOUNTER — HOSPITAL ENCOUNTER (OUTPATIENT)
Facility: HOSPITAL | Age: 64
Setting detail: OBSERVATION
Discharge: HOME | End: 2024-02-27
Attending: EMERGENCY MEDICINE | Admitting: FAMILY MEDICINE
Payer: COMMERCIAL

## 2024-02-25 ENCOUNTER — APPOINTMENT (OUTPATIENT)
Dept: CT IMAGING | Facility: HOSPITAL | Age: 64
End: 2024-02-25
Payer: COMMERCIAL

## 2024-02-25 ENCOUNTER — APPOINTMENT (EMERGENCY)
Dept: RADIOLOGY | Facility: HOSPITAL | Age: 64
End: 2024-02-25
Payer: COMMERCIAL

## 2024-02-25 DIAGNOSIS — J20.8 VIRAL BRONCHITIS: ICD-10-CM

## 2024-02-25 DIAGNOSIS — J18.9 CAP (COMMUNITY ACQUIRED PNEUMONIA): Primary | ICD-10-CM

## 2024-02-25 PROBLEM — R17 SERUM TOTAL BILIRUBIN ELEVATED: Status: ACTIVE | Noted: 2024-02-25

## 2024-02-25 PROBLEM — J96.01 ACUTE RESPIRATORY FAILURE WITH HYPOXIA (HCC): Status: ACTIVE | Noted: 2024-02-25

## 2024-02-25 PROBLEM — R65.10 SIRS (SYSTEMIC INFLAMMATORY RESPONSE SYNDROME) (HCC): Status: ACTIVE | Noted: 2024-02-25

## 2024-02-25 LAB
ALBUMIN SERPL BCP-MCNC: 4.5 G/DL (ref 3.5–5)
ALP SERPL-CCNC: 71 U/L (ref 34–104)
ALT SERPL W P-5'-P-CCNC: 15 U/L (ref 7–52)
ANION GAP SERPL CALCULATED.3IONS-SCNC: 11 MMOL/L
APTT PPP: 30 SECONDS (ref 23–37)
AST SERPL W P-5'-P-CCNC: 21 U/L (ref 13–39)
BASOPHILS # BLD AUTO: 0.03 THOUSANDS/ÂΜL (ref 0–0.1)
BASOPHILS NFR BLD AUTO: 0 % (ref 0–1)
BILIRUB SERPL-MCNC: 1.34 MG/DL (ref 0.2–1)
BUN SERPL-MCNC: 15 MG/DL (ref 5–25)
CALCIUM SERPL-MCNC: 9.6 MG/DL (ref 8.4–10.2)
CHLORIDE SERPL-SCNC: 101 MMOL/L (ref 96–108)
CO2 SERPL-SCNC: 24 MMOL/L (ref 21–32)
CREAT SERPL-MCNC: 0.77 MG/DL (ref 0.6–1.3)
EOSINOPHIL # BLD AUTO: 0 THOUSAND/ÂΜL (ref 0–0.61)
EOSINOPHIL NFR BLD AUTO: 0 % (ref 0–6)
ERYTHROCYTE [DISTWIDTH] IN BLOOD BY AUTOMATED COUNT: 12.7 % (ref 11.6–15.1)
FLUAV RNA RESP QL NAA+PROBE: NEGATIVE
FLUBV RNA RESP QL NAA+PROBE: NEGATIVE
GFR SERPL CREATININE-BSD FRML MDRD: 83 ML/MIN/1.73SQ M
GLUCOSE SERPL-MCNC: 113 MG/DL (ref 65–140)
HCT VFR BLD AUTO: 41.1 % (ref 34.8–46.1)
HGB BLD-MCNC: 13.7 G/DL (ref 11.5–15.4)
IMM GRANULOCYTES # BLD AUTO: 0.04 THOUSAND/UL (ref 0–0.2)
IMM GRANULOCYTES NFR BLD AUTO: 0 % (ref 0–2)
INR PPP: 1.05 (ref 0.84–1.19)
LACTATE SERPL-SCNC: 1 MMOL/L (ref 0.5–2)
LYMPHOCYTES # BLD AUTO: 0.43 THOUSANDS/ÂΜL (ref 0.6–4.47)
LYMPHOCYTES NFR BLD AUTO: 5 % (ref 14–44)
MCH RBC QN AUTO: 30 PG (ref 26.8–34.3)
MCHC RBC AUTO-ENTMCNC: 33.3 G/DL (ref 31.4–37.4)
MCV RBC AUTO: 90 FL (ref 82–98)
MONOCYTES # BLD AUTO: 0.32 THOUSAND/ÂΜL (ref 0.17–1.22)
MONOCYTES NFR BLD AUTO: 4 % (ref 4–12)
NEUTROPHILS # BLD AUTO: 8.35 THOUSANDS/ÂΜL (ref 1.85–7.62)
NEUTS SEG NFR BLD AUTO: 91 % (ref 43–75)
NRBC BLD AUTO-RTO: 0 /100 WBCS
PLATELET # BLD AUTO: 205 THOUSANDS/UL (ref 149–390)
PMV BLD AUTO: 11.5 FL (ref 8.9–12.7)
POTASSIUM SERPL-SCNC: 3.7 MMOL/L (ref 3.5–5.3)
PROCALCITONIN SERPL-MCNC: 0.12 NG/ML
PROT SERPL-MCNC: 7.7 G/DL (ref 6.4–8.4)
PROTHROMBIN TIME: 14.3 SECONDS (ref 11.6–14.5)
RBC # BLD AUTO: 4.57 MILLION/UL (ref 3.81–5.12)
RSV RNA RESP QL NAA+PROBE: NEGATIVE
SARS-COV-2 RNA RESP QL NAA+PROBE: NEGATIVE
SODIUM SERPL-SCNC: 136 MMOL/L (ref 135–147)
WBC # BLD AUTO: 9.17 THOUSAND/UL (ref 4.31–10.16)

## 2024-02-25 PROCEDURE — 94760 N-INVAS EAR/PLS OXIMETRY 1: CPT

## 2024-02-25 PROCEDURE — 87040 BLOOD CULTURE FOR BACTERIA: CPT | Performed by: PHYSICIAN ASSISTANT

## 2024-02-25 PROCEDURE — 93005 ELECTROCARDIOGRAM TRACING: CPT

## 2024-02-25 PROCEDURE — 96365 THER/PROPH/DIAG IV INF INIT: CPT

## 2024-02-25 PROCEDURE — 71250 CT THORAX DX C-: CPT

## 2024-02-25 PROCEDURE — 99285 EMERGENCY DEPT VISIT HI MDM: CPT | Performed by: PHYSICIAN ASSISTANT

## 2024-02-25 PROCEDURE — 0241U HB NFCT DS VIR RESP RNA 4 TRGT: CPT | Performed by: PHYSICIAN ASSISTANT

## 2024-02-25 PROCEDURE — 83605 ASSAY OF LACTIC ACID: CPT | Performed by: PHYSICIAN ASSISTANT

## 2024-02-25 PROCEDURE — 99223 1ST HOSP IP/OBS HIGH 75: CPT | Performed by: FAMILY MEDICINE

## 2024-02-25 PROCEDURE — 99285 EMERGENCY DEPT VISIT HI MDM: CPT

## 2024-02-25 PROCEDURE — 96361 HYDRATE IV INFUSION ADD-ON: CPT

## 2024-02-25 PROCEDURE — 85025 COMPLETE CBC W/AUTO DIFF WBC: CPT | Performed by: PHYSICIAN ASSISTANT

## 2024-02-25 PROCEDURE — 84145 PROCALCITONIN (PCT): CPT | Performed by: PHYSICIAN ASSISTANT

## 2024-02-25 PROCEDURE — 85730 THROMBOPLASTIN TIME PARTIAL: CPT | Performed by: PHYSICIAN ASSISTANT

## 2024-02-25 PROCEDURE — 80053 COMPREHEN METABOLIC PANEL: CPT | Performed by: PHYSICIAN ASSISTANT

## 2024-02-25 PROCEDURE — 96375 TX/PRO/DX INJ NEW DRUG ADDON: CPT

## 2024-02-25 PROCEDURE — 36415 COLL VENOUS BLD VENIPUNCTURE: CPT | Performed by: PHYSICIAN ASSISTANT

## 2024-02-25 PROCEDURE — 71046 X-RAY EXAM CHEST 2 VIEWS: CPT

## 2024-02-25 PROCEDURE — 96367 TX/PROPH/DG ADDL SEQ IV INF: CPT

## 2024-02-25 PROCEDURE — 85610 PROTHROMBIN TIME: CPT | Performed by: PHYSICIAN ASSISTANT

## 2024-02-25 PROCEDURE — 94640 AIRWAY INHALATION TREATMENT: CPT

## 2024-02-25 RX ORDER — LEVALBUTEROL INHALATION SOLUTION 1.25 MG/3ML
SOLUTION RESPIRATORY (INHALATION)
Status: COMPLETED
Start: 2024-02-25 | End: 2024-02-25

## 2024-02-25 RX ORDER — GUAIFENESIN/DEXTROMETHORPHAN 100-10MG/5
10 SYRUP ORAL EVERY 4 HOURS PRN
Status: DISCONTINUED | OUTPATIENT
Start: 2024-02-25 | End: 2024-02-27 | Stop reason: HOSPADM

## 2024-02-25 RX ORDER — ALBUTEROL SULFATE 2.5 MG/3ML
5 SOLUTION RESPIRATORY (INHALATION) ONCE
Status: COMPLETED | OUTPATIENT
Start: 2024-02-25 | End: 2024-02-25

## 2024-02-25 RX ORDER — ALBUTEROL SULFATE 90 UG/1
2 AEROSOL, METERED RESPIRATORY (INHALATION) EVERY 6 HOURS
COMMUNITY
Start: 2024-02-23

## 2024-02-25 RX ORDER — ACETAMINOPHEN 10 MG/ML
1000 INJECTION, SOLUTION INTRAVENOUS ONCE
Status: COMPLETED | OUTPATIENT
Start: 2024-02-25 | End: 2024-02-25

## 2024-02-25 RX ORDER — LEVALBUTEROL INHALATION SOLUTION 1.25 MG/3ML
1.25 SOLUTION RESPIRATORY (INHALATION)
Status: DISCONTINUED | OUTPATIENT
Start: 2024-02-25 | End: 2024-02-27 | Stop reason: HOSPADM

## 2024-02-25 RX ORDER — CEFTRIAXONE 1 G/50ML
1000 INJECTION, SOLUTION INTRAVENOUS ONCE
Status: COMPLETED | OUTPATIENT
Start: 2024-02-25 | End: 2024-02-25

## 2024-02-25 RX ORDER — ONDANSETRON 2 MG/ML
4 INJECTION INTRAMUSCULAR; INTRAVENOUS ONCE
Status: DISCONTINUED | OUTPATIENT
Start: 2024-02-25 | End: 2024-02-25

## 2024-02-25 RX ORDER — METOCLOPRAMIDE HYDROCHLORIDE 5 MG/ML
10 INJECTION INTRAMUSCULAR; INTRAVENOUS ONCE
Status: COMPLETED | OUTPATIENT
Start: 2024-02-25 | End: 2024-02-25

## 2024-02-25 RX ORDER — KETOROLAC TROMETHAMINE 30 MG/ML
15 INJECTION, SOLUTION INTRAMUSCULAR; INTRAVENOUS ONCE
Status: COMPLETED | OUTPATIENT
Start: 2024-02-25 | End: 2024-02-25

## 2024-02-25 RX ORDER — ALBUTEROL SULFATE 2.5 MG/3ML
2.5 SOLUTION RESPIRATORY (INHALATION) EVERY 4 HOURS PRN
Status: DISCONTINUED | OUTPATIENT
Start: 2024-02-25 | End: 2024-02-27 | Stop reason: HOSPADM

## 2024-02-25 RX ORDER — ONDANSETRON 2 MG/ML
4 INJECTION INTRAMUSCULAR; INTRAVENOUS ONCE
Status: COMPLETED | OUTPATIENT
Start: 2024-02-25 | End: 2024-02-25

## 2024-02-25 RX ADMIN — ONDANSETRON 4 MG: 2 INJECTION INTRAMUSCULAR; INTRAVENOUS at 17:29

## 2024-02-25 RX ADMIN — IPRATROPIUM BROMIDE 0.5 MG: 0.5 SOLUTION RESPIRATORY (INHALATION) at 22:04

## 2024-02-25 RX ADMIN — METOCLOPRAMIDE 10 MG: 5 INJECTION, SOLUTION INTRAMUSCULAR; INTRAVENOUS at 18:57

## 2024-02-25 RX ADMIN — KETOROLAC TROMETHAMINE 15 MG: 30 INJECTION, SOLUTION INTRAMUSCULAR; INTRAVENOUS at 16:53

## 2024-02-25 RX ADMIN — GUAIFENESIN AND DEXTROMETHORPHAN 10 ML: 100; 10 SYRUP ORAL at 19:36

## 2024-02-25 RX ADMIN — IPRATROPIUM BROMIDE 0.5 MG: 0.5 SOLUTION RESPIRATORY (INHALATION) at 22:03

## 2024-02-25 RX ADMIN — AZITHROMYCIN MONOHYDRATE 500 MG: 500 INJECTION, POWDER, LYOPHILIZED, FOR SOLUTION INTRAVENOUS at 18:32

## 2024-02-25 RX ADMIN — LEVALBUTEROL HYDROCHLORIDE 1.25 MG: 1.25 SOLUTION RESPIRATORY (INHALATION) at 22:02

## 2024-02-25 RX ADMIN — ACETAMINOPHEN 1000 MG: 10 INJECTION INTRAVENOUS at 16:53

## 2024-02-25 RX ADMIN — GUAIFENESIN AND DEXTROMETHORPHAN 10 ML: 100; 10 SYRUP ORAL at 23:22

## 2024-02-25 RX ADMIN — LEVALBUTEROL HYDROCHLORIDE 1.25 MG: 1.25 SOLUTION RESPIRATORY (INHALATION) at 22:04

## 2024-02-25 RX ADMIN — SODIUM CHLORIDE 1000 ML: 0.9 INJECTION, SOLUTION INTRAVENOUS at 16:53

## 2024-02-25 RX ADMIN — ALBUTEROL SULFATE 5 MG: 2.5 SOLUTION RESPIRATORY (INHALATION) at 16:36

## 2024-02-25 RX ADMIN — IPRATROPIUM BROMIDE 0.5 MG: 0.5 SOLUTION RESPIRATORY (INHALATION) at 16:36

## 2024-02-25 RX ADMIN — CEFTRIAXONE 1000 MG: 1 INJECTION, SOLUTION INTRAVENOUS at 17:09

## 2024-02-25 RX ADMIN — SODIUM CHLORIDE 1000 ML: 0.9 INJECTION, SOLUTION INTRAVENOUS at 17:15

## 2024-02-25 NOTE — ED PROVIDER NOTES
History  Chief Complaint   Patient presents with    Cough     Cough, fevers, chills since Thurs      62 yo with fever, cough, sob, congestion. Started Thursday. Diagnosed with bronchitis at PCP on Friday. Had flu/covid swab which was negative. Worsened today. Fever 103. Feels dehydrated. No hemoptysis. Has chronic leg pain which is unchanged from baseline. She has h/o VTE provoked by surgery. No h/o ACS/CAD       History provided by:  Patient   used: No    Cough  Associated symptoms: fever and myalgias    Associated symptoms: no chest pain, no chills, no ear pain, no rash, no shortness of breath and no sore throat        None       Past Medical History:   Diagnosis Date    Fatigue     No pertinent past medical history     SOB (shortness of breath)        Past Surgical History:   Procedure Laterality Date    ANKLE SURGERY      CHEST WALL BIOPSY Left 5/13/2022    Procedure: EXCISION  BIOPSY LEFT CHEST WALL SOFT TISSUE MASS;  Surgeon: Rishi Kessler MD;  Location: [facility];  Service: General    MASS EXCISION Right 5/13/2022    Procedure: EXCISION BIOPSY TISSUE LESION/MASS LOWER EXTREMITY RIGHT POSTERIOR THIGH;  Surgeon: Rishi Kessler MD;  Location: [facility];  Service: General    ROTATOR CUFF REPAIR Right     THYROIDECTOMY, PARTIAL      TUBAL LIGATION      VARICOSE VEIN SURGERY         Family History   Problem Relation Age of Onset    No Known Problems Mother     Diabetes Father     Lymphoma Sister     No Known Problems Sister     No Known Problems Daughter     Lymphoma Daughter     No Known Problems Daughter     No Known Problems Maternal Grandmother     No Known Problems Maternal Grandfather     No Known Problems Paternal Grandmother     No Known Problems Paternal Grandfather     No Known Problems Maternal Aunt     Breast cancer Neg Hx     BRCA2 Positive Neg Hx     BRCA2 Negative Neg Hx     BRCA1 Positive Neg Hx     BRCA1 Negative Neg Hx     BRCA 1/2 Neg Hx     Ovarian cancer Neg Hx      Endometrial cancer Neg Hx     Colon cancer Neg Hx     Breast cancer additional onset Neg Hx      I have reviewed and agree with the history as documented.    E-Cigarette/Vaping    E-Cigarette Use Never User      E-Cigarette/Vaping Substances    Nicotine No     THC No     CBD No     Flavoring No     Other No     Unknown No      Social History     Tobacco Use    Smoking status: Never    Smokeless tobacco: Never   Vaping Use    Vaping status: Never Used   Substance Use Topics    Alcohol use: Not Currently    Drug use: Not Currently       Review of Systems   Constitutional:  Positive for fatigue and fever. Negative for chills.   HENT:  Positive for congestion. Negative for ear pain and sore throat.    Eyes:  Negative for pain and visual disturbance.   Respiratory:  Positive for cough. Negative for shortness of breath.    Cardiovascular:  Negative for chest pain and palpitations.   Gastrointestinal:  Negative for abdominal pain and vomiting.   Genitourinary:  Negative for dysuria and hematuria.   Musculoskeletal:  Positive for myalgias. Negative for arthralgias and back pain.   Skin:  Negative for color change and rash.   Neurological:  Negative for seizures and syncope.   All other systems reviewed and are negative.      Physical Exam  Physical Exam  Vitals and nursing note reviewed.   Constitutional:       General: She is not in acute distress.     Appearance: She is well-developed.   HENT:      Head: Normocephalic and atraumatic.   Eyes:      Conjunctiva/sclera: Conjunctivae normal.   Cardiovascular:      Rate and Rhythm: Regular rhythm. Tachycardia present.      Heart sounds: No murmur heard.  Pulmonary:      Effort: Pulmonary effort is normal. No respiratory distress.      Breath sounds: Normal breath sounds.   Abdominal:      Palpations: Abdomen is soft.      Tenderness: There is no abdominal tenderness.   Musculoskeletal:         General: No swelling.      Cervical back: Neck supple.   Skin:     General: Skin is  warm and dry.      Capillary Refill: Capillary refill takes less than 2 seconds.   Neurological:      Mental Status: She is alert.   Psychiatric:         Mood and Affect: Mood normal.         Vital Signs  ED Triage Vitals   Temperature Pulse Respirations Blood Pressure SpO2   02/25/24 1524 02/25/24 1524 02/25/24 1524 02/25/24 1524 02/25/24 1524   100.1 °F (37.8 °C) (!) 135 20 130/93 96 %      Temp Source Heart Rate Source Patient Position - Orthostatic VS BP Location FiO2 (%)   02/25/24 1524 02/25/24 1524 02/25/24 1524 02/25/24 1524 --   Temporal Monitor Sitting Left arm       Pain Score       02/25/24 1653       10 - Worst Possible Pain           Vitals:    02/25/24 1524 02/25/24 1745   BP: 130/93 136/53   Pulse: (!) 135 99   Patient Position - Orthostatic VS: Sitting          Visual Acuity      ED Medications  Medications   azithromycin (ZITHROMAX) 500 mg in sodium chloride 0.9% 250mL IVPB 500 mg (500 mg Intravenous New Bag 2/25/24 1832)   sodium chloride 0.9 % bolus 1,000 mL (1,000 mL Intravenous New Bag 2/25/24 1653)     Followed by   sodium chloride 0.9 % bolus 1,000 mL (has no administration in time range)   ketorolac (TORADOL) injection 15 mg (15 mg Intravenous Given 2/25/24 1653)   acetaminophen (Ofirmev) injection 1,000 mg (0 mg Intravenous Stopped 2/25/24 1708)   cefTRIAXone (ROCEPHIN) IVPB (premix in dextrose) 1,000 mg 50 mL (0 mg Intravenous Stopped 2/25/24 1739)   albuterol inhalation solution 5 mg (5 mg Nebulization Given 2/25/24 1636)   ipratropium (ATROVENT) 0.02 % inhalation solution 0.5 mg (0.5 mg Nebulization Given 2/25/24 1636)   ondansetron (ZOFRAN) injection 4 mg (4 mg Intravenous Given 2/25/24 1729)   metoclopramide (REGLAN) injection 10 mg (10 mg Intravenous Given 2/25/24 1857)       Diagnostic Studies  Results Reviewed       Procedure Component Value Units Date/Time    Blood culture #1 [[id number]] Collected: 02/25/24 0771    Lab Status: In process Specimen: Blood from Arm, Left Updated:  02/25/24 1843    FLU/RSV/COVID - if FLU/RSV clinically relevant [[id number]]  (Normal) Collected: 02/25/24 1650    Lab Status: Final result Specimen: Nares from Nose Updated: 02/25/24 1747     SARS-CoV-2 Negative     INFLUENZA A PCR Negative     INFLUENZA B PCR Negative     RSV PCR Negative    Narrative:      FOR PEDIATRIC PATIENTS - copy/paste COVID Guidelines URL to browser: [email or web address]    SARS-CoV-2 assay is a Nucleic Acid Amplification assay intended for the  qualitative detection of nucleic acid from SARS-CoV-2 in nasopharyngeal  swabs. Results are for the presumptive identification of SARS-CoV-2 RNA.    Positive results are indicative of infection with SARS-CoV-2, the virus  causing COVID-19, but do not rule out bacterial infection or co-infection  with other viruses. Laboratories within the [place] and its  territories are required to report all positive results to the appropriate  public health authorities. Negative results do not preclude SARS-CoV-2  infection and should not be used as the sole basis for treatment or other  patient management decisions. Negative results must be combined with  clinical observations, patient history, and epidemiological information.  This test has not been FDA cleared or approved.    This test has been authorized by FDA under an Emergency Use Authorization  (EUA). This test is only authorized for the duration of time the  declaration that circumstances exist justifying the authorization of the  emergency use of an in vitro diagnostic tests for detection of SARS-CoV-2  virus and/or diagnosis of COVID-19 infection under section 564(b)(1) of  the Act, 21 U.S.C. 360bbb-3(b)(1), unless the authorization is terminated  or revoked sooner. The test has been validated but independent review by FDA  and CLIA is pending.    Test performed using [employer]: This RT-PCR assay targets N2,  a region unique to  SARS-CoV-2. A conserved region in the E-gene was chosen  for pan-Sarbecovirus detection which includes SARS-CoV-2.    According to CMS-2020-01-R, this platform meets the definition of high-throughput technology.    Procalcitonin [[id number]]  (Normal) Collected: 02/25/24 1650    Lab Status: Final result Specimen: Blood from Arm, Right Updated: 02/25/24 1738     Procalcitonin 0.12 ng/ml     CBC and differential [[id number]]  (Abnormal) Collected: 02/25/24 1650    Lab Status: Final result Specimen: Blood from Arm, Right Updated: 02/25/24 1724     WBC 9.17 Thousand/uL      RBC 4.57 Million/uL      Hemoglobin 13.7 g/dL      Hematocrit 41.1 %      MCV 90 fL      MCH 30.0 pg      MCHC 33.3 g/dL      RDW 12.7 %      MPV 11.5 fL      Platelets 205 Thousands/uL      nRBC 0 /100 WBCs      Neutrophils Relative 91 %      Immat GRANS % 0 %      Lymphocytes Relative 5 %      Monocytes Relative 4 %      Eosinophils Relative 0 %      Basophils Relative 0 %      Neutrophils Absolute 8.35 Thousands/µL      Immature Grans Absolute 0.04 Thousand/uL      Lymphocytes Absolute 0.43 Thousands/µL      Monocytes Absolute 0.32 Thousand/µL      Eosinophils Absolute 0.00 Thousand/µL      Basophils Absolute 0.03 Thousands/µL     Narrative:      This is an appended report.  These results have been appended to a previously verified report.    Comprehensive metabolic panel [[id number]]  (Abnormal) Collected: 02/25/24 1650    Lab Status: Final result Specimen: Blood from Arm, Right Updated: 02/25/24 1724     Sodium 136 mmol/L      Potassium 3.7 mmol/L      Chloride 101 mmol/L      CO2 24 mmol/L      ANION GAP 11 mmol/L      BUN 15 mg/dL      Creatinine 0.77 mg/dL      Glucose 113 mg/dL      Calcium 9.6 mg/dL      AST 21 U/L      ALT 15 U/L      Alkaline Phosphatase 71 U/L      Total Protein 7.7 g/dL      Albumin 4.5 g/dL      Total Bilirubin 1.34 mg/dL      eGFR 83 ml/min/1.73sq m     Narrative:      National Kidney Disease Foundation guidelines for  Chronic Kidney Disease (CKD):     Stage 1 with normal or high GFR (GFR > 90 mL/min/1.73 square meters)    Stage 2 Mild CKD (GFR = 60-89 mL/min/1.73 square meters)    Stage 3A Moderate CKD (GFR = 45-59 mL/min/1.73 square meters)    Stage 3B Moderate CKD (GFR = 30-44 mL/min/1.73 square meters)    Stage 4 Severe CKD (GFR = 15-29 mL/min/1.73 square meters)    Stage 5 End Stage CKD (GFR <15 mL/min/1.73 square meters)  Note: GFR calculation is accurate only with a steady state creatinine    Lactic acid [[id number]]  (Normal) Collected: 02/25/24 1650    Lab Status: Final result Specimen: Blood from Arm, Right Updated: 02/25/24 1724     LACTIC ACID 1.0 mmol/L     Narrative:      Result may be elevated if tourniquet was used during collection.    Protime-INR [[id number]]  (Normal) Collected: 02/25/24 1650    Lab Status: Final result Specimen: Blood from Arm, Right Updated: 02/25/24 1717     Protime 14.3 seconds      INR 1.05    APTT [[id number]]  (Normal) Collected: 02/25/24 1650    Lab Status: Final result Specimen: Blood from Arm, Right Updated: 02/25/24 1717     PTT 30 seconds     Blood culture #2 [[id number]] Collected: 02/25/24 1650    Lab Status: In process Specimen: Blood from Arm, Right Updated: 02/25/24 1700                   XR chest 2 views   ED Interpretation by Joey Hernandez PA-C (02/25 1720)   RLL infiltrate                 Procedures  Procedures         ED Course                               SBIRT 22yo+      Flowsheet Row Most Recent Value   Initial Alcohol Screen: US AUDIT-C     1. How often do you have a drink containing alcohol? 0 Filed at: 02/25/2024 1622   2. How many drinks containing alcohol do you have on a typical day you are drinking?  0 Filed at: 02/25/2024 1622   3b. FEMALE Any Age, or MALE 65+: How often do you have 4 or more drinks on one occassion? 0 Filed at: 02/25/2024 1622   Audit-C Score 0 Filed at: 02/25/2024 1622   [place]: How many times in the past year have you...    Used an illegal drug  or used a prescription medication for non-medical reasons? Never Filed at: 02/25/2024 1622                      Medical Decision Making  Ddx includes but is not limited to flu, covid, rsv, pneumonia, bronchitis, doubt CHF, PE, ACS. Plan: labs. EKG. XR chest. Neb. Abx. Fluids.     MDM: 64 yo with fever, sob, cough. RLL pneumonia on XR. Hypoxic 88% on room air. 94-96% on 2L. Labs overall unremarkable. Based on vital signs meets sepsis criteria. Plan to admit for further workup and respiratory support. Pt in agreement. Stable at time of admission.     Amount and/or Complexity of Data Reviewed  Labs: ordered.  Radiology: ordered and independent interpretation performed.    Risk  Prescription drug management.  Decision regarding hospitalization.             Disposition  Final diagnoses:   CAP (community acquired pneumonia)     Time reflects when diagnosis was documented in both MDM as applicable and the Disposition within this note       Time User Action Codes Description Comment    2/25/2024  7:00 PM Joey Hernandez Add [J18.9] CAP (community acquired pneumonia)           ED Disposition       ED Disposition   Admit    Condition   Stable    Date/Time   Sun Feb 25, 2024 1900    Comment   Case was discussed with Dr. Wagner Darnell and the patient's admission status was agreed to be Admission Status: observation status to the service of Dr. Darnell .               Follow-up Information    None         Patient's Medications    No medications on file       No discharge procedures on file.    PDMP Review       None            ED Provider  Electronically Signed by             Joey Hernandez PA-C  02/25/24 1900

## 2024-02-26 LAB — PROCALCITONIN SERPL-MCNC: 0.53 NG/ML

## 2024-02-26 PROCEDURE — 94664 DEMO&/EVAL PT USE INHALER: CPT

## 2024-02-26 PROCEDURE — 94760 N-INVAS EAR/PLS OXIMETRY 1: CPT

## 2024-02-26 PROCEDURE — 94640 AIRWAY INHALATION TREATMENT: CPT

## 2024-02-26 PROCEDURE — 84145 PROCALCITONIN (PCT): CPT | Performed by: FAMILY MEDICINE

## 2024-02-26 PROCEDURE — 99232 SBSQ HOSP IP/OBS MODERATE 35: CPT

## 2024-02-26 RX ORDER — IBUPROFEN 400 MG/1
400 TABLET ORAL EVERY 6 HOURS PRN
Status: DISCONTINUED | OUTPATIENT
Start: 2024-02-26 | End: 2024-02-27 | Stop reason: HOSPADM

## 2024-02-26 RX ORDER — CEFTRIAXONE 2 G/50ML
2000 INJECTION, SOLUTION INTRAVENOUS EVERY 24 HOURS
Status: DISCONTINUED | OUTPATIENT
Start: 2024-02-26 | End: 2024-02-27 | Stop reason: HOSPADM

## 2024-02-26 RX ORDER — IBUPROFEN 400 MG/1
400 TABLET ORAL EVERY 6 HOURS PRN
Status: DISCONTINUED | OUTPATIENT
Start: 2024-02-26 | End: 2024-02-26

## 2024-02-26 RX ORDER — ACETAMINOPHEN 325 MG/1
650 TABLET ORAL EVERY 6 HOURS PRN
Status: DISCONTINUED | OUTPATIENT
Start: 2024-02-26 | End: 2024-02-27 | Stop reason: HOSPADM

## 2024-02-26 RX ADMIN — IPRATROPIUM BROMIDE 0.5 MG: 0.5 SOLUTION RESPIRATORY (INHALATION) at 20:03

## 2024-02-26 RX ADMIN — IBUPROFEN 400 MG: 400 TABLET, FILM COATED ORAL at 18:02

## 2024-02-26 RX ADMIN — ACETAMINOPHEN 650 MG: 325 TABLET, FILM COATED ORAL at 06:43

## 2024-02-26 RX ADMIN — LEVALBUTEROL HYDROCHLORIDE 1.25 MG: 1.25 SOLUTION RESPIRATORY (INHALATION) at 07:10

## 2024-02-26 RX ADMIN — GUAIFENESIN AND DEXTROMETHORPHAN 10 ML: 100; 10 SYRUP ORAL at 05:37

## 2024-02-26 RX ADMIN — LEVALBUTEROL HYDROCHLORIDE 1.25 MG: 1.25 SOLUTION RESPIRATORY (INHALATION) at 20:03

## 2024-02-26 RX ADMIN — GUAIFENESIN AND DEXTROMETHORPHAN 10 ML: 100; 10 SYRUP ORAL at 19:16

## 2024-02-26 RX ADMIN — CEFTRIAXONE 2000 MG: 2 INJECTION, SOLUTION INTRAVENOUS at 12:38

## 2024-02-26 RX ADMIN — GUAIFENESIN AND DEXTROMETHORPHAN 10 ML: 100; 10 SYRUP ORAL at 12:38

## 2024-02-26 RX ADMIN — IPRATROPIUM BROMIDE 0.5 MG: 0.5 SOLUTION RESPIRATORY (INHALATION) at 07:10

## 2024-02-26 NOTE — ASSESSMENT & PLAN NOTE
Patient presented to the ED with cough with fever and chills.  Subjective fever of 103 degrees at home.  Concern for severe viral bronchitis versus viral pneumonia.  Less likely to be bacterial-procalcitonin and lactic normal  Requiring 2 L of supplemental oxygen for a oxygen saturation level of 88%, continue 2 L to keep sats greater than 88%  Flu/Covid/RSV Swab negative.  Respiratory protocol  Incentive spirometer

## 2024-02-26 NOTE — H&P
Formerly [facility]  H&P  Name: Abbi Claudio 63 y.o. female I MRN: [id number]  Unit/Bed#: -01 I Date of Admission: 2/25/2024   Date of Service: 2/25/2024 I Hospital Day: 0      Assessment/Plan   * Acute respiratory failure with hypoxia (HCC)  Assessment & Plan  Patient presented to the ED with cough with fever and chills.  Subjective fever of 103 degrees at home.  Concern for severe viral bronchitis versus viral pneumonia.  Less likely to be bacterial-procalcitonin and lactic normal  Requiring 2 L of supplemental oxygen for a oxygen saturation level of 88%, continue 2 L to keep sats greater than 88%  Flu/Covid/RSV Swab negative.  Respiratory protocol  Incentive spirometer    Viral bronchitis  Assessment & Plan  Likely represents a viral process  Holding off on antibiotics  Check CT chest without contrast  Holding off further antibiotics until CT and a.m. procalcitonin    Serum total bilirubin elevated  Assessment & Plan  Chronically elevated, 1.34 on admission  No abdominal pain complaints  Monitor in a.m.    SIRS (systemic inflammatory response syndrome) (HCC)  Assessment & Plan  Present on admission, evidenced by tachycardia and tachypnea  Procalcitonin, lactic acid levels within normal limits.  Hold off on further antibiotics.  Recheck Pro-Luke in a.m.  Received 1 x dose of Azithromycin/Ceftriaxone in the ER  Suspicious of viral process as all bacterial work-up thus far appears negative  CT Chest ordered, not yet completed.  Blood cultures ordered, Urine for strep pneumoniae/legionella ordered.           VTE Pharmacologic Prophylaxis:   Low Risk (Score 0-2) - Encourage Ambulation.  Code Status: Level 1 - Full Code   Discussion with family: Updated  () at bedside.    Anticipated Length of Stay: Patient will be admitted on an observation basis with an anticipated length of stay of less than 2 midnights secondary to concern for pneumonia for which CT chest has been  ordered.  Acute hypoxic respiratory failure which will require further oxygen titration and home oxygen evaluation which will require hospital stay.    Total Time Spent on Date of Encounter in care of patient: 56 mins. This time was spent on one or more of the following: performing physical exam; counseling and coordination of care; obtaining or reviewing history; documenting in the medical record; reviewing/ordering tests, medications or procedures; communicating with other healthcare professionals and discussing with patient's family/caregivers.    Chief Complaint: Worsening shortness of breath and cough with expectoration    History of Present Illness:  Abbi Claudio is a 63 y.o. female with no significant past medical history presents with worsening shortness of breath and cough.  This has been ongoing for about a 4 days.  Patient saw her primary care physician who prescribed her albuterol.  Since then her symptoms have worsened and she had subjective fever at home greater than 102 °F.  She also has worsening cough with light yellow sputum.  She denies chest pain, diarrhea, abdominal pain, lightheadedness, dizziness or syncope.  In the ER patient was found to be hypoxic and was requiring about 2 L of supplemental oxygen.  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    Review of Systems:  Review of Systems   Constitutional:  Positive for fever. Negative for chills.   HENT:  Negative for ear pain and sore throat.    Eyes:  Negative for pain and visual disturbance.   Respiratory:  Positive for cough, shortness of breath and wheezing.    Cardiovascular:  Negative for chest pain and palpitations.   Gastrointestinal:  Negative for abdominal pain and vomiting.   Genitourinary:  Negative for dysuria and hematuria.   Musculoskeletal:  Negative for arthralgias and back pain.   Skin:  Negative for color change and rash.   Neurological:  Negative for seizures and syncope.   All other systems  reviewed and are negative.      Past Medical and Surgical History:   Past Medical History:   Diagnosis Date    Fatigue     No pertinent past medical history     SOB (shortness of breath)        Past Surgical History:   Procedure Laterality Date    ANKLE SURGERY      CHEST WALL BIOPSY Left 5/13/2022    Procedure: EXCISION  BIOPSY LEFT CHEST WALL SOFT TISSUE MASS;  Surgeon: Rishi Kessler MD;  Location: [facility];  Service: General    MASS EXCISION Right 5/13/2022    Procedure: EXCISION BIOPSY TISSUE LESION/MASS LOWER EXTREMITY RIGHT POSTERIOR THIGH;  Surgeon: Rishi Kessler MD;  Location: [facility];  Service: General    ROTATOR CUFF REPAIR Right     THYROIDECTOMY, PARTIAL      TUBAL LIGATION      VARICOSE VEIN SURGERY         Meds/Allergies:  Prior to Admission medications    Medication Sig Start Date End Date Taking? Authorizing Provider   albuterol (Ventolin HFA) 90 mcg/act inhaler Inhale 2 puffs every 6 (six) hours 2/23/24  Yes Historical Provider, MD     I have reviewed home medications with patient personally.    Allergies: No Known Allergies    Social History:  Marital Status: /Civil Union   Occupation: Nurse  Patient Pre-hospital Living Situation: Home  Patient Pre-hospital Level of Mobility: walks  Patient Pre-hospital Diet Restrictions: None  Substance Use History:   Social History     Substance and Sexual Activity   Alcohol Use Not Currently     Social History     Tobacco Use   Smoking Status Never   Smokeless Tobacco Never     Social History     Substance and Sexual Activity   Drug Use Not Currently       Family History:  Family History   Problem Relation Age of Onset    No Known Problems Mother     Diabetes Father     Lymphoma Sister     No Known Problems Sister     No Known Problems Daughter     Lymphoma Daughter     No Known Problems Daughter     No Known Problems Maternal Grandmother     No Known Problems Maternal Grandfather     No Known Problems Paternal Grandmother     No Known Problems  Paternal Grandfather     No Known Problems Maternal Aunt     Breast cancer Neg Hx     BRCA2 Positive Neg Hx     BRCA2 Negative Neg Hx     BRCA1 Positive Neg Hx     BRCA1 Negative Neg Hx     BRCA 1/2 Neg Hx     Ovarian cancer Neg Hx     Endometrial cancer Neg Hx     Colon cancer Neg Hx     Breast cancer additional onset Neg Hx        Physical Exam:     Vitals:   Blood Pressure: 91/56 (02/25/24 2000)  Pulse: 88 (02/25/24 2000)  Temperature: 100.1 °F (37.8 °C) (02/25/24 1524)  Temp Source: Temporal (02/25/24 1524)  Respirations: (!) 28 (02/25/24 2000)  SpO2: 93 % (02/25/24 2000)    Physical Exam General- Awake, alert and oriented x 3, looks comfortable  HEENT- Normocephalic, atraumatic, oral mucosa- moist  Neck- Supple, No carotid bruit, no JVD  CVS- Normal S1/ S2, Regular rate and rhythm, No murmur, No edema  Respiratory system-bilateral wheezing audible with some crackles  Abdomen- Soft, Non distended, no tenderness, Bowel sound- present 4 quads  Genitourinary- No suprapubic tenderness, No CVA tenderness  Skin- No new bruise or rash  Musculoskeletal- No gross deformity  Psych- No acute psychosis  CNS- CN II- XII grossly intact, No acute focal neurologic deficit noted      Additional Data:     Lab Results:  Results from last 7 days   Lab Units 02/25/24  1650   WBC Thousand/uL 9.17   HEMOGLOBIN g/dL 13.7   HEMATOCRIT % 41.1   PLATELETS Thousands/uL 205   NEUTROS PCT % 91*   LYMPHS PCT % 5*   MONOS PCT % 4   EOS PCT % 0     Results from last 7 days   Lab Units 02/25/24  1650   SODIUM mmol/L 136   POTASSIUM mmol/L 3.7   CHLORIDE mmol/L 101   CO2 mmol/L 24   BUN mg/dL 15   CREATININE mg/dL 0.77   ANION GAP mmol/L 11   CALCIUM mg/dL 9.6   ALBUMIN g/dL 4.5   TOTAL BILIRUBIN mg/dL 1.34*   ALK PHOS U/L 71   ALT U/L 15   AST U/L 21   GLUCOSE RANDOM mg/dL 113     Results from last 7 days   Lab Units 02/25/24  1650   INR  1.05             Results from last 7 days   Lab Units 02/25/24  1650   LACTIC ACID mmol/L 1.0    PROCALCITONIN ng/ml 0.12       Lines/Drains:  Invasive Devices       Peripheral Intravenous Line  Duration             Peripheral IV 02/25/24 Left Antecubital <1 day    Peripheral IV 02/25/24 Right Antecubital <1 day    Peripheral IV 02/25/24 Right;Ventral (anterior) Hand <1 day                        Imaging: Reviewed radiology reports from this admission including: chest xray  XR chest 2 views   ED Interpretation by Joey Hernandez PA-C (02/25 1720)   RLL infiltrate      CT chest wo contrast    (Results Pending)       EKG and Other Studies Reviewed on Admission:   EKG:  Sinus tachycardia, nonspecific T wave abnormalities, no acute ST-T wave elevations or depressions.    ** Please Note: This note has been constructed using a voice recognition system. **

## 2024-02-26 NOTE — PROGRESS NOTES
[facility]  Progress Note  Name: Abbi Claudio I  MRN: [id number]  Unit/Bed#: -01 I Date of Admission: 2/25/2024   Date of Service: 2/26/2024 I Hospital Day: 0    Assessment/Plan   * Acute respiratory failure with hypoxia (HCC)  Assessment & Plan  Patient presented to the ED with cough with fever and chills.  Subjective fever of 103 degrees at home.  Concern for severe viral bronchitis versus viral pneumonia, versus bacterial etiology  Procalcitonin elevated 0.53 today  Initially requiring 2 L of supplemental O2, however weaned off, currently satting well on room air  Flu/Covid/RSV Swab negative.  Respiratory protocol  Incentive spirometer    Viral bronchitis  Assessment & Plan  Likely represents a viral process  Possible bacterial etiology, will start IV antibiotics  CT chest redemonstrated right lower lobe bronchiolitis  Trend procalcitonin    Serum total bilirubin elevated  Assessment & Plan  Chronically elevated, 1.34 on admission  No abdominal pain complaints  Monitor in a.m.    SIRS (systemic inflammatory response syndrome) (HCC)  Assessment & Plan  Present on admission, evidenced by tachycardia and tachypnea  Lactic acid within normal limits, procalcitonin within normal limits on admission, however up trended to 0.53 in a.m.  Received 1 x dose of Azithromycin/Ceftriaxone in the ER and was then monitored off IV antibiotics however given Pro-Luke IV antibiotics initiated  Suspicious of viral process, however procalcitonin elevated to 0.53 and antibiotics were started  CT Chest demonstrated right lower lobe bronchiolitis  Blood cultures pending  Strep and Legionella urine studies negative           VTE Pharmacologic Prophylaxis:   Low Risk (Score 0-2) - Encourage Ambulation.    Mobility:   Basic Mobility Inpatient Raw Score: 24  -HLM Goal: 8: Walk 250 feet or more  HLM Goal achieved. Continue to encourage appropriate mobility.    Patient Centered Rounds: I performed bedside  rounds with nursing staff today.   Discussions with Specialists or Other Care Team Provider: JIN    Education and Discussions with Family / Patient: Patient declined call to .     Total Time Spent on Date of Encounter in care of patient: 35 mins. This time was spent on one or more of the following: performing physical exam; counseling and coordination of care; obtaining or reviewing history; documenting in the medical record; reviewing/ordering tests, medications or procedures; communicating with other healthcare professionals and discussing with patient's family/caregivers.    Current Length of Stay: 0 day(s)  Current Patient Status: Observation   Certification Statement: The patient will continue to require additional inpatient hospital stay due to continued IV antibiotics in setting of possible bacterial pneumonia  Discharge Plan: Anticipate discharge tomorrow to home.    Code Status: Level 1 - Full Code    Subjective:   Patient reports still having a significant cough and shortness of breath as well as intermittent chills.  She currently denies any chest pain/pressure, palpitations, lightheadedness, or nausea.    Objective:     Vitals:   Temp (24hrs), Av.5 °F (36.9 °C), Min:97.8 °F (36.6 °C), Max:100.1 °F (37.8 °C)    Temp:  [97.8 °F (36.6 °C)-100.1 °F (37.8 °C)] 98.2 °F (36.8 °C)  HR:  [] 81  Resp:  [17-28] 17  BP: ()/(52-93) 101/60  SpO2:  [88 %-97 %] 94 %  Body mass index is 29.56 kg/m².     Input and Output Summary (last 24 hours):     Intake/Output Summary (Last 24 hours) at 2024 1314  Last data filed at 2024 1933  Gross per 24 hour   Intake 400 ml   Output --   Net 400 ml       Physical Exam:   Physical Exam  Vitals and nursing note reviewed.   Constitutional:       Appearance: She is normal weight.   HENT:      Head: Normocephalic.      Nose: Nose normal.      Mouth/Throat:      Mouth: Mucous membranes are moist.      Pharynx: Oropharynx is clear.   Eyes:       General: No scleral icterus.     Conjunctiva/sclera: Conjunctivae normal.      Pupils: Pupils are equal, round, and reactive to light.   Cardiovascular:      Rate and Rhythm: Normal rate and regular rhythm.      Heart sounds: No murmur heard.     No friction rub. No gallop.   Pulmonary:      Effort: Pulmonary effort is normal. No respiratory distress.      Breath sounds: No stridor. Rhonchi present. No wheezing or rales.   Abdominal:      General: Abdomen is flat.      Palpations: Abdomen is soft.   Musculoskeletal:         General: Normal range of motion.      Cervical back: Normal range of motion and neck supple.      Right lower leg: No edema.      Left lower leg: No edema.   Lymphadenopathy:      Cervical: No cervical adenopathy.   Skin:     General: Skin is warm.      Coloration: Skin is not jaundiced or pale.      Findings: No bruising, erythema or lesion.   Neurological:      General: No focal deficit present.      Mental Status: She is alert and oriented to person, place, and time. Mental status is at baseline.      Cranial Nerves: No cranial nerve deficit.      Motor: No weakness.   Psychiatric:         Mood and Affect: Mood normal.         Behavior: Behavior normal.         Thought Content: Thought content normal.          Additional Data:     Labs:  Results from last 7 days   Lab Units 02/25/24  1650   WBC Thousand/uL 9.17   HEMOGLOBIN g/dL 13.7   HEMATOCRIT % 41.1   PLATELETS Thousands/uL 205   NEUTROS PCT % 91*   LYMPHS PCT % 5*   MONOS PCT % 4   EOS PCT % 0     Results from last 7 days   Lab Units 02/25/24  1650   SODIUM mmol/L 136   POTASSIUM mmol/L 3.7   CHLORIDE mmol/L 101   CO2 mmol/L 24   BUN mg/dL 15   CREATININE mg/dL 0.77   ANION GAP mmol/L 11   CALCIUM mg/dL 9.6   ALBUMIN g/dL 4.5   TOTAL BILIRUBIN mg/dL 1.34*   ALK PHOS U/L 71   ALT U/L 15   AST U/L 21   GLUCOSE RANDOM mg/dL 113     Results from last 7 days   Lab Units 02/25/24  1650   INR  1.05             Results from last 7 days   Lab  Units 02/26/24  0430 02/25/24  1650   LACTIC ACID mmol/L  --  1.0   PROCALCITONIN ng/ml 0.53* 0.12       Lines/Drains:  Invasive Devices       Peripheral Intravenous Line  Duration             Peripheral IV 02/25/24 Left Antecubital <1 day    Peripheral IV 02/25/24 Right;Ventral (anterior) Hand <1 day                          Imaging: Reviewed radiology reports from this admission including: chest CT scan    Recent Cultures (last 7 days):   Results from last 7 days   Lab Units 02/25/24  1704 02/25/24  1650   BLOOD CULTURE  Received in Microbiology Lab. Culture in Progress. Received in Microbiology Lab. Culture in Progress.       Last 24 Hours Medication List:   Current Facility-Administered Medications   Medication Dose Route Frequency Provider Last Rate    acetaminophen  650 mg Oral Q6H PRN Blaise Nugent MD      albuterol  2.5 mg Nebulization Q4H PRN Wagner Darnell MD      cefTRIAXone  2,000 mg Intravenous Q24H Beau Myers PA-C 2,000 mg (02/26/24 1238)    dextromethorphan-guaiFENesin  10 mL Oral Q4H PRN Wagner Darnell MD      ipratropium  0.5 mg Nebulization BID Wagner Darnell MD      levalbuterol  1.25 mg Nebulization BID Wagner Darnell MD          Today, Patient Was Seen By: Beau Myers PA-C    **Please Note: This note may have been constructed using a voice recognition system.**

## 2024-02-26 NOTE — UTILIZATION REVIEW
Initial Clinical Review    Admission: Date/Time/Statement:   Admission Orders (From admission, onward)       Ordered        02/25/24 1900  Place in Observation  Once                          Orders Placed This Encounter   Procedures    Place in Observation     Standing Status:   Standing     Number of Occurrences:   1     Order Specific Question:   Level of Care     Answer:   Med Surg [16]     ED Arrival Information       Expected   -    Arrival   2/25/2024 15:19    Acuity   Urgent              Means of arrival   Walk-In    Escorted by   Spouse    Service   Hospitalist    Admission type   Emergency              Arrival complaint   FLU SYMPTOMS             Chief Complaint   Patient presents with    Cough     Cough, fevers, chills since Thurs        Initial Presentation: 63 y.o. female , presented to the ED @ [facility], from home via walk in with Spouse.    Admitted as Observation due to Acute Respiratory Failure with Hypoxia.    No significant past medical history   Date: 02/25/2024   Presents with worsening shortness of breath and cough. This has been ongoing for about a 4 days. Patient saw her primary care physician who prescribed her albuterol. Since then her symptoms have worsened and she had subjective fever at home greater than 102 °F. She also has worsening cough with light yellow sputum. She denies chest pain, diarrhea, abdominal pain, lightheadedness, dizziness or syncope. In the ER patient was found to be hypoxic and was requiring about 2 L of supplemental oxygen.   Pulse Ox 88% Room Air.  Flu/Covid/RSV Swab negative.  IS.  Obtain CT Chest.  Follow up Blood Cultures.      Day 2: 02/26/2024   Patient reports still having a significant cough and shortness of breath as well as intermittent chills.  Continue IV Abx.  Currently on Room Air.  Atrovent Nebulization BID.  Follow up blood cultures.        ED Triage Vitals   Temperature Pulse Respirations Blood Pressure SpO2   02/25/24 1524 02/25/24 1524 02/25/24  1524 02/25/24 1524 02/25/24 1524   100.1 °F (37.8 °C) (!) 135 20 130/93 96 %      Temp Source Heart Rate Source Patient Position - Orthostatic VS BP Location FiO2 (%)   02/25/24 1524 02/25/24 1524 02/25/24 1524 02/25/24 1524 --   Temporal Monitor Sitting Left arm       Pain Score       02/25/24 1653       10 - Worst Possible Pain          Wt Readings from Last 1 Encounters:   02/25/24 90.8 kg (200 lb 2.8 oz)     Additional Vital Signs:  Date/Time Temp Pulse Resp BP MAP (mmHg) SpO2 O2 Device Patient Position - Orthostatic VS   02/26/24 08:44:42 98.2 °F (36.8 °C) 81 17 101/60 74 94 % -- --   02/26/24 0710 -- -- -- -- -- 90 % None (Room air) --   02/25/24 2208 -- 89 18 -- -- 95 % None (Room air) --   02/25/24 2204 -- -- -- -- -- 95 % -- --   02/25/24 22:00:17 98 °F (36.7 °C) 87 17 107/60 76 94 % -- --   02/25/24 20:28:19 97.8 °F (36.6 °C) 87 -- 102/57 72 92 % -- --   02/25/24 2000 -- 88 28 Abnormal  91/56 68 93 % None (Room air) Sitting   02/25/24 1930 -- 94 22 96/56 71 97 % None (Room air) Sitting   02/25/24 1915 -- 96 22 83/52 Abnormal  62 Abnormal  91 % None (Room air) Sitting   02/25/24 1859 -- -- -- -- -- 88 % Abnormal   -- --   SpO2: placed on 2L at 02/25/24 1859 02/25/24 1745 -- 99 22 136/53 74 94 % None (Room air) --     Date and Time Eye Opening Best Verbal Response Best Motor Response Danita Coma Scale Score   02/25/24 2028 4 5 6 15   02/25/24 1621 4 5 6 15         Pertinent Labs/Diagnostic Test Results:   CT chest wo contrast   Final Result by Andreas Perez MD (02/26 0847)   Right lower lobe bronchiolitis.      XR chest 2 views   ED Interpretation by Joey Hernandez PA-C (02/25 1720)   RLL infiltrate      Final Result by Andreas Perez MD (02/26 0847)   Right lower lobe bronchiolitis.        Results from last 7 days   Lab Units 02/25/24  1650   SARS-COV-2  Negative     Results from last 7 days   Lab Units 02/25/24  1650   WBC Thousand/uL 9.17   HEMOGLOBIN g/dL 13.7   HEMATOCRIT % 41.1   PLATELETS  Thousands/uL 205   NEUTROS ABS Thousands/µL 8.35*     Results from last 7 days   Lab Units 02/25/24  1650   SODIUM mmol/L 136   POTASSIUM mmol/L 3.7   CHLORIDE mmol/L 101   CO2 mmol/L 24   ANION GAP mmol/L 11   BUN mg/dL 15   CREATININE mg/dL 0.77   EGFR ml/min/1.73sq m 83   CALCIUM mg/dL 9.6     Results from last 7 days   Lab Units 02/25/24  1650   AST U/L 21   ALT U/L 15   ALK PHOS U/L 71   TOTAL PROTEIN g/dL 7.7   ALBUMIN g/dL 4.5   TOTAL BILIRUBIN mg/dL 1.34*     Results from last 7 days   Lab Units 02/25/24  1650   GLUCOSE RANDOM mg/dL 113     Results from last 7 days   Lab Units 02/25/24  1650   PROTIME seconds 14.3   INR  1.05   PTT seconds 30     Results from last 7 days   Lab Units 02/26/24  0430 02/25/24  1650   PROCALCITONIN ng/ml 0.53* 0.12     Results from last 7 days   Lab Units 02/25/24  1650   LACTIC ACID mmol/L 1.0       Results from last 7 days   Lab Units 02/25/24  1650   INFLUENZA A PCR  Negative   INFLUENZA B PCR  Negative   RSV PCR  Negative     Results from last 7 days   Lab Units 02/25/24  1704 02/25/24  1650   BLOOD CULTURE  Received in Microbiology Lab. Culture in Progress. Received in Microbiology Lab. Culture in Progress.     ED Treatment:   Medication Administration from 02/25/2024 1519 to 02/25/2024 2017         Date/Time Order Dose Route Action     02/25/2024 1653 EST ketorolac (TORADOL) injection 15 mg 15 mg Intravenous Given     02/25/2024 1653 EST acetaminophen (Ofirmev) injection 1,000 mg 1,000 mg Intravenous New Bag     02/25/2024 1709 EST cefTRIAXone (ROCEPHIN) IVPB (premix in dextrose) 1,000 mg 50 mL 1,000 mg Intravenous New Bag     02/25/2024 1832 EST azithromycin (ZITHROMAX) 500 mg in sodium chloride 0.9% 250mL IVPB 500 mg 500 mg Intravenous New Bag     02/25/2024 1653 EST sodium chloride 0.9 % bolus 1,000 mL 1,000 mL Intravenous New Bag     02/25/2024 1715 EST sodium chloride 0.9 % bolus 1,000 mL 1,000 mL Intravenous New Bag     02/25/2024 1636 EST albuterol inhalation  solution 5 mg 5 mg Nebulization Given     02/25/2024 1636 EST ipratropium (ATROVENT) 0.02 % inhalation solution 0.5 mg 0.5 mg Nebulization Given     02/25/2024 1729 EST ondansetron (ZOFRAN) injection 4 mg 4 mg Intravenous Given     02/25/2024 1857 EST metoclopramide (REGLAN) injection 10 mg 10 mg Intravenous Given     02/25/2024 1936 EST dextromethorphan-guaiFENesin (ROBITUSSIN DM) oral syrup 10 mL 10 mL Oral Given          Past Medical History:   Diagnosis Date    Fatigue     No pertinent past medical history     SOB (shortness of breath)      Present on Admission:   Acute respiratory failure with hypoxia (HCC)   SIRS (systemic inflammatory response syndrome) (HCC)   Serum total bilirubin elevated   Viral bronchitis      Admitting Diagnosis: Cough [R05.9]  CAP (community acquired pneumonia) [J18.9]  Age/Sex: 63 y.o. female  Admission Orders:  Regular Diet / Aspiration Precautions / Oral Care  Up & OOB as tolerated  IS q4h while awake  Elevate HOB 30 degrees  Shakir SCDs        Scheduled Medications:  cefTRIAXone, 2,000 mg, Intravenous, Q24H  ipratropium, 0.5 mg, Nebulization, BID  levalbuterol, 1.25 mg, Nebulization, BID      Continuous IV Infusions:     PRN Meds:  acetaminophen, 650 mg, Oral, Q6H PRN   x 1 dose 2/26  albuterol, 2.5 mg, Nebulization, Q4H PRN  dextromethorphan-guaiFENesin, 10 mL, Oral, Q4H PRN   x 2 doses 2/25;  x 1 dose 2/26            Network Utilization Review Department  ATTENTION: Please call with any questions or concerns to [phone] and carefully listen to the prompts so that you are directed to the right person. All voicemails are confidential.   For Discharge needs, contact Care Management DC Support Team at [phone] opt. 2  Send all requests for admission clinical reviews, approved or denied determinations and any other requests to dedicated fax number below belonging to the campus where the patient is receiving treatment. List of dedicated fax numbers for the Facilities:  FACILITY  NAME UR FAX NUMBER   ADMISSION DENIALS (Administrative/Medical Necessity) [phone]   DISCHARGE SUPPORT TEAM (NETWORK) [phone]   PARENT CHILD HEALTH (Maternity/NICU/Pediatrics) [phone]   [facility] [phone]   [facility] [phone]   [facility] [phone]   [facility] [phone]   [facility] [phone]   [facility] [phone]   [facility] [phone]   [facility] [phone]   [facility] [phone]   [facility] [phone]   [facility] [phone]   [facility] [phone]

## 2024-02-26 NOTE — ASSESSMENT & PLAN NOTE
Present on admission, evidenced by tachycardia and tachypnea  Lactic acid within normal limits, procalcitonin within normal limits on admission, however up trended to 0.53 in a.m.  Received 1 x dose of Azithromycin/Ceftriaxone in the ER and was then monitored off IV antibiotics however given Pro-Luke IV antibiotics initiated  Suspicious of viral process, however procalcitonin elevated to 0.53 and antibiotics were started  CT Chest demonstrated right lower lobe bronchiolitis  Blood cultures pending  Strep and Legionella urine studies negative

## 2024-02-26 NOTE — PLAN OF CARE
Problem: PAIN - ADULT  Goal: Verbalizes/displays adequate comfort level or baseline comfort level  Description: Interventions:  - Encourage patient to monitor pain and request assistance  - Assess pain using appropriate pain scale  - Administer analgesics based on type and severity of pain and evaluate response  - Implement non-pharmacological measures as appropriate and evaluate response  - Consider cultural and social influences on pain and pain management  - Notify physician/advanced practitioner if interventions unsuccessful or patient reports new pain  Outcome: Progressing     Problem: SAFETY ADULT  Goal: Patient will remain free of falls  Description: INTERVENTIONS:  - Educate patient/family on patient safety including physical limitations  - Instruct patient to call for assistance with activity   - Consult OT/PT to assist with strengthening/mobility   - Keep Call bell within reach  - Keep bed low and locked with side rails adjusted as appropriate  - Keep care items and personal belongings within reach  - Initiate and maintain comfort rounds  - Make Fall Risk Sign visible to staff  - Offer Toileting every 2 Hours, in advance of need  - Initiate/Maintain bed alarm  - Obtain necessary fall risk management equipment: call bell within reach   - Apply yellow socks and bracelet for high fall risk patients  - Consider moving patient to room near nurses station  Outcome: Progressing     Problem: SAFETY ADULT  Goal: Maintains/Returns to pre admission functional level  Description: INTERVENTIONS:  - Perform AM-PAC 6 Click Basic Mobility/ Daily Activity assessment daily.  - Set and communicate daily mobility goal to care team and patient/family/caregiver.   - Collaborate with rehabilitation services on mobility goals if consulted  - Perform Range of Motion 3 times a day.  - Reposition patient every 3 hours.  - Dangle patient 3 times a day  - Stand patient 3 times a day  - Ambulate patient 3 times a day  - Out of bed to  chair 3 times a day   - Out of bed for meals 3 times a day  - Out of bed for toileting  - Record patient progress and toleration of activity level   Outcome: Progressing

## 2024-02-26 NOTE — ASSESSMENT & PLAN NOTE
Likely represents a viral process  Possible bacterial etiology, will start IV antibiotics  CT chest redemonstrated right lower lobe bronchiolitis  Trend procalcitonin

## 2024-02-26 NOTE — ASSESSMENT & PLAN NOTE
Likely represents a viral process  Holding off on antibiotics  Check CT chest without contrast  Holding off further antibiotics until CT and a.m. procalcitonin

## 2024-02-26 NOTE — RESPIRATORY THERAPY NOTE
RT Protocol Note  Abbi Claudio 63 y.o. female MRN: [id number]  Unit/Bed#: -01 Encounter: [id number]    Assessment    Principal Problem:    Acute respiratory failure with hypoxia (HCC)  Active Problems:    SIRS (systemic inflammatory response syndrome) (HCC)    Serum total bilirubin elevated    Viral bronchitis      Home Pulmonary Medications:  inhalers       Past Medical History:   Diagnosis Date    Fatigue     No pertinent past medical history     SOB (shortness of breath)      Social History     Socioeconomic History    Marital status: /Civil Union     Spouse name: None    Number of children: None    Years of education: None    Highest education level: None   Occupational History    None   Tobacco Use    Smoking status: Never    Smokeless tobacco: Never   Vaping Use    Vaping status: Never Used   Substance and Sexual Activity    Alcohol use: Not Currently    Drug use: Not Currently    Sexual activity: None   Other Topics Concern    None   Social History Narrative    Lives with      Nurse with [employer]      Social Determinants of Health     Financial Resource Strain: Not on file   Food Insecurity: Not on file   Transportation Needs: Not on file   Physical Activity: Not on file   Stress: Not on file   Social Connections: Not on file   Intimate Partner Violence: Not on file   Housing Stability: Not on file       Subjective    Subjective Data: awake and alert    Objective    Physical Exam:   Assessment Type: Assess only  General Appearance: Alert, Awake  Respiratory Pattern: Shallow, Spontaneous  Chest Assessment: Chest expansion symmetrical  Bilateral Breath Sounds: Diminished, Scattered, Coarse, Crackles, Expiratory wheezes  Cough: Non-productive, Moist, Strong  O2 Device: room air    Vitals:  Blood pressure 107/60, pulse 89, temperature 98 °F (36.7 °C), resp. rate 18, weight 90.8 kg (200 lb 2.8 oz), SpO2 95%, not currently breastfeeding.          Imaging and other studies: I  have personally reviewed pertinent reports.      O2 Device: room air     Plan    Respiratory Plan: Mild Distress pathway        Resp Comments: respiratory protocol completed patient being admitted for CAP without significant pulmonary PMH pateint had been ordered MDI at home without improvement and came to ED patient will be ordered on aerosol therapy BID with PRN in attempt to imporve pulmonary status

## 2024-02-26 NOTE — ASSESSMENT & PLAN NOTE
Present on admission, evidenced by tachycardia and tachypnea  Procalcitonin, lactic acid levels within normal limits.  Hold off on further antibiotics.  Recheck Pro-Luke in a.m.  Received 1 x dose of Azithromycin/Ceftriaxone in the ER  Suspicious of viral process as all bacterial work-up thus far appears negative  CT Chest ordered, not yet completed.  Blood cultures ordered, Urine for strep pneumoniae/legionella ordered.

## 2024-02-26 NOTE — ASSESSMENT & PLAN NOTE
Patient presented to the ED with cough with fever and chills.  Subjective fever of 103 degrees at home.  Concern for severe viral bronchitis versus viral pneumonia, versus bacterial etiology  Procalcitonin elevated 0.53 today  Initially requiring 2 L of supplemental O2, however weaned off, currently satting well on room air  Flu/Covid/RSV Swab negative.  Respiratory protocol  Incentive spirometer

## 2024-02-27 VITALS
RESPIRATION RATE: 18 BRPM | HEIGHT: 69 IN | DIASTOLIC BLOOD PRESSURE: 70 MMHG | TEMPERATURE: 97.2 F | SYSTOLIC BLOOD PRESSURE: 105 MMHG | WEIGHT: 200.18 LBS | OXYGEN SATURATION: 94 % | HEART RATE: 89 BPM | BODY MASS INDEX: 29.65 KG/M2

## 2024-02-27 LAB
ANION GAP SERPL CALCULATED.3IONS-SCNC: 7 MMOL/L
BUN SERPL-MCNC: 13 MG/DL (ref 5–25)
CALCIUM SERPL-MCNC: 8.7 MG/DL (ref 8.4–10.2)
CHLORIDE SERPL-SCNC: 107 MMOL/L (ref 96–108)
CO2 SERPL-SCNC: 25 MMOL/L (ref 21–32)
CREAT SERPL-MCNC: 0.53 MG/DL (ref 0.6–1.3)
ERYTHROCYTE [DISTWIDTH] IN BLOOD BY AUTOMATED COUNT: 12.9 % (ref 11.6–15.1)
GFR SERPL CREATININE-BSD FRML MDRD: 102 ML/MIN/1.73SQ M
GLUCOSE P FAST SERPL-MCNC: 94 MG/DL (ref 65–99)
GLUCOSE SERPL-MCNC: 94 MG/DL (ref 65–140)
HCT VFR BLD AUTO: 33.5 % (ref 34.8–46.1)
HGB BLD-MCNC: 11.2 G/DL (ref 11.5–15.4)
MCH RBC QN AUTO: 30.4 PG (ref 26.8–34.3)
MCHC RBC AUTO-ENTMCNC: 33.4 G/DL (ref 31.4–37.4)
MCV RBC AUTO: 91 FL (ref 82–98)
PLATELET # BLD AUTO: 138 THOUSANDS/UL (ref 149–390)
PMV BLD AUTO: 11.1 FL (ref 8.9–12.7)
POTASSIUM SERPL-SCNC: 3.6 MMOL/L (ref 3.5–5.3)
RBC # BLD AUTO: 3.69 MILLION/UL (ref 3.81–5.12)
SODIUM SERPL-SCNC: 139 MMOL/L (ref 135–147)
WBC # BLD AUTO: 6.31 THOUSAND/UL (ref 4.31–10.16)

## 2024-02-27 PROCEDURE — 80048 BASIC METABOLIC PNL TOTAL CA: CPT

## 2024-02-27 PROCEDURE — 94664 DEMO&/EVAL PT USE INHALER: CPT

## 2024-02-27 PROCEDURE — 85027 COMPLETE CBC AUTOMATED: CPT

## 2024-02-27 PROCEDURE — 94640 AIRWAY INHALATION TREATMENT: CPT

## 2024-02-27 PROCEDURE — 94760 N-INVAS EAR/PLS OXIMETRY 1: CPT

## 2024-02-27 PROCEDURE — 99239 HOSP IP/OBS DSCHRG MGMT >30: CPT

## 2024-02-27 RX ORDER — DOXYCYCLINE 100 MG/1
100 TABLET ORAL 2 TIMES DAILY
Qty: 8 TABLET | Refills: 0 | Status: SHIPPED | OUTPATIENT
Start: 2024-02-27 | End: 2024-03-02

## 2024-02-27 RX ORDER — PREDNISONE 10 MG/1
TABLET ORAL DAILY
Qty: 30 TABLET | Refills: 0 | Status: SHIPPED | OUTPATIENT
Start: 2024-02-28 | End: 2024-03-11

## 2024-02-27 RX ADMIN — LEVALBUTEROL HYDROCHLORIDE 1.25 MG: 1.25 SOLUTION RESPIRATORY (INHALATION) at 07:19

## 2024-02-27 RX ADMIN — IPRATROPIUM BROMIDE 0.5 MG: 0.5 SOLUTION RESPIRATORY (INHALATION) at 07:19

## 2024-02-27 RX ADMIN — CEFTRIAXONE 2000 MG: 2 INJECTION, SOLUTION INTRAVENOUS at 12:21

## 2024-02-27 RX ADMIN — IBUPROFEN 400 MG: 400 TABLET, FILM COATED ORAL at 00:37

## 2024-02-27 RX ADMIN — IBUPROFEN 400 MG: 400 TABLET, FILM COATED ORAL at 10:58

## 2024-02-27 RX ADMIN — GUAIFENESIN AND DEXTROMETHORPHAN 10 ML: 100; 10 SYRUP ORAL at 00:36

## 2024-02-27 NOTE — RESPIRATORY THERAPY NOTE
RT Protocol Note  Abbi Claudio 63 y.o. female MRN: [id number]  Unit/Bed#: -01 Encounter: [id number]    Assessment    Principal Problem:    Acute respiratory failure with hypoxia (HCC)  Active Problems:    SIRS (systemic inflammatory response syndrome) (HCC)    Serum total bilirubin elevated    Viral bronchitis      Home Pulmonary Medications:     02/27/24 0722   Respiratory Protocol   Protocol Initiated? No   Protocol Selection Respiratory   Language Barrier? No   Medical & Social History Reviewed? Yes   Diagnostic Studies Reviewed? Yes   Physical Assessment Performed? Yes   Respiratory Plan Mild Distress pathway   Respiratory Assessment   Assessment Type Pre-treatment   General Appearance Alert;Awake   Respiratory Pattern Normal   Chest Assessment Chest expansion symmetrical   Bilateral Breath Sounds Diminished   Resp Comments continue current therapy   O2 Device ra   Additional Assessments   SpO2 98 %            Past Medical History:   Diagnosis Date    Fatigue     No pertinent past medical history     SOB (shortness of breath)      Social History     Socioeconomic History    Marital status: /Civil Union     Spouse name: None    Number of children: None    Years of education: None    Highest education level: None   Occupational History    None   Tobacco Use    Smoking status: Never    Smokeless tobacco: Never   Vaping Use    Vaping status: Never Used   Substance and Sexual Activity    Alcohol use: Not Currently    Drug use: Not Currently    Sexual activity: None   Other Topics Concern    None   Social History Narrative    Lives with      Nurse with [employer]      Social Determinants of Health     Financial Resource Strain: Not on file   Food Insecurity: No Food Insecurity (2/25/2024)    Hunger Vital Sign     Worried About Running Out of Food in the Last Year: Never true     Ran Out of Food in the Last Year: Never true   Transportation Needs: No Transportation Needs (2/25/2024)  "   PRAPARE - Transportation     Lack of Transportation (Medical): No     Lack of Transportation (Non-Medical): No   Physical Activity: Not on file   Stress: Not on file   Social Connections: Not on file   Intimate Partner Violence: Not on file   Housing Stability: Unknown (2/25/2024)    Housing Stability Vital Sign     Unable to Pay for Housing in the Last Year: Not on file     Number of Places Lived in the Last Year: 1     Unstable Housing in the Last Year: No       Subjective    Subjective Data: awake and alert    Objective    Physical Exam:   Assessment Type: Pre-treatment  General Appearance: Alert, Awake  Respiratory Pattern: Normal  Chest Assessment: Chest expansion symmetrical  Bilateral Breath Sounds: Diminished  O2 Device: ra    Vitals:  Blood pressure 105/70, pulse 89, temperature (!) 97.2 °F (36.2 °C), resp. rate 18, height 5' 9\" (1.753 m), weight 90.8 kg (200 lb 2.8 oz), SpO2 93%, not currently breastfeeding.          Imaging and other studies: I have personally reviewed pertinent reports.      O2 Device: ra     Plan    Respiratory Plan: Mild Distress pathway        Resp Comments: continue current therapy   "

## 2024-02-27 NOTE — DISCHARGE INSTR - AVS FIRST PAGE
Please be aware I have added to medications to your daily medication regimen.  1 medication is a prednisone taper.  Please starting tomorrow take 4 tablets daily for the first 3 days, followed by 3 tablets daily for the next 3 days, followed by 2 tablets daily for the next 3 days, and lastly 1 tablet daily for the final 3 days.  I have also added an antibiotic.  This antibiotic is doxycycline.  Please take doxycycline with food twice a day or roughly every 12 hours.  Please take this for the next 4 days starting tomorrow.    Please follow with your PCP within 1 week.

## 2024-02-27 NOTE — PLAN OF CARE
Problem: INFECTION - ADULT  Goal: Absence or prevention of progression during hospitalization  Description: INTERVENTIONS:  - Assess and monitor for signs and symptoms of infection  - Monitor lab/diagnostic results  - Monitor all insertion sites, i.e. indwelling lines, tubes, and drains  - Monitor endotracheal if appropriate and nasal secretions for changes in amount and color  - Hugo appropriate cooling/warming therapies per order  - Administer medications as ordered  - Instruct and encourage patient and family to use good hand hygiene technique  - Identify and instruct in appropriate isolation precautions for identified infection/condition  Outcome: Progressing

## 2024-02-27 NOTE — ASSESSMENT & PLAN NOTE
Patient presented to the ED with cough with fever and chills.  Subjective fever of 103 degrees at home.  Concern for severe viral bronchitis versus viral pneumonia, versus bacterial etiology  Procalcitonin elevated 0.53 continue with doxycycline  Initially requiring 2 L of supplemental O2, however weaned off, currently satting well on room air  Flu/Covid/RSV Swab negative.  Respiratory protocol  Incentive spirometer

## 2024-02-27 NOTE — DISCHARGE SUMMARY
[facility]  Discharge- Abbi Claudio 1960, 63 y.o. female MRN: [id number]  Unit/Bed#: -01 Encounter: [id number]  Primary Care Provider: Allan Hartley DO   Date and time admitted to hospital: 2/25/2024  4:17 PM    * Acute respiratory failure with hypoxia (HCC)  Assessment & Plan  Patient presented to the ED with cough with fever and chills.  Subjective fever of 103 degrees at home.  Concern for severe viral bronchitis versus viral pneumonia, versus bacterial etiology  Procalcitonin elevated 0.53 continue with doxycycline  Initially requiring 2 L of supplemental O2, however weaned off, currently satting well on room air  Flu/Covid/RSV Swab negative.  Respiratory protocol  Incentive spirometer    Viral bronchitis  Assessment & Plan  Likely represents a viral process  Possible bacterial etiology, will start IV antibiotics  CT chest redemonstrated right lower lobe bronchiolitis  Trend procalcitonin    Serum total bilirubin elevated  Assessment & Plan  Chronically elevated, 1.34 on admission  No abdominal pain complaints  Monitor in a.m.    SIRS (systemic inflammatory response syndrome) (HCC)  Assessment & Plan  Present on admission, evidenced by tachycardia and tachypnea  Lactic acid within normal limits, procalcitonin within normal limits on admission, however up trended to 0.53 in a.m.  Received 1 x dose of Azithromycin/Ceftriaxone in the ER and was then monitored off IV antibiotics however given Pro-Luke IV antibiotics initiated  Suspicious of viral process, however procalcitonin elevated to 0.53 and antibiotics were started  CT Chest demonstrated right lower lobe bronchiolitis  Blood cultures no growth after 24 hours  Strep and Legionella urine studies negative      Medical Problems       Resolved Problems  Date Reviewed: 2/25/2024   None       Discharging Physician / Practitioner: Beau Myers PA-C  PCP: Allan Hartley,   Admission Date:   Admission Orders  (From admission, onward)       Ordered        02/25/24 1900  Place in Observation  Once                          Discharge Date: 02/27/24    Consultations During Hospital Stay:  None    Procedures Performed:   XR chest 2 views  Impression: Right lower lobe bronchiolitis    CT chest wo contrast  Impression: Right lower lobe bronchiolitis      Significant Findings / Test Results:   Blood culture showed no growth after 24 hours  COVID/RSV negative  Procalcitonin up trended to 0.53    Incidental Findings:   None      Test Results Pending at Discharge (will require follow up):   None     Outpatient Tests Requested:  None     Complications: None    Reason for Admission: Acute respiratory failure with hypoxia    Hospital Course:   Abbi Claudio is a 63 y.o. female patient who originally presented to the hospital on 2/25/2024 due to few days of cough, fever, and chills.  The patient was examined in the ED and is found to have likely viral bronchitis versus a viral pneumonia.  She was observed off antibiotics and the patient's procalcitonin up trended.  The patient was then started on IV antibiotics.  The patient continued to respond well and felt significantly improved and on 2/27 was improved enough to be discharged home.  She will be discharged home with a short course of oral antibiotics as well as a short course of prednisone.  At this time patient medically stable for discharge and agreeable for plan at discharge.  For further information regards course hospitalization, please note attached.      Please see above list of diagnoses and related plan for additional information.     Condition at Discharge: good    Discharge Day Visit / Exam:   Subjective: Patient reports to be feeling well.  She denies chest pain/pressure, palpitations clamminess, nausea, shortness breath or chills.  She continues to have a significant cough however improved compared to yesterday.  Vitals: Blood Pressure: 105/70 (02/27/24  "0806)  Pulse: 89 (02/27/24 0806)  Temperature: (!) 97.2 °F (36.2 °C) (02/27/24 0806)  Temp Source: Temporal (02/25/24 1524)  Respirations: 18 (02/27/24 0806)  Height: 5' 9\" (175.3 cm) (02/26/24 1529)  Weight - Scale: 90.8 kg (200 lb 2.8 oz) (02/26/24 1529)  SpO2: 94 % (02/27/24 0900)  Exam:   Physical Exam  Vitals and nursing note reviewed.   Constitutional:       Appearance: She is normal weight.   HENT:      Head: Normocephalic.      Nose: Nose normal.      Mouth/Throat:      Mouth: Mucous membranes are moist.      Pharynx: Oropharynx is clear.   Eyes:      General: No scleral icterus.     Conjunctiva/sclera: Conjunctivae normal.      Pupils: Pupils are equal, round, and reactive to light.   Cardiovascular:      Rate and Rhythm: Normal rate and regular rhythm.      Heart sounds: No murmur heard.     No friction rub. No gallop.   Pulmonary:      Effort: Pulmonary effort is normal. No respiratory distress.      Breath sounds: No stridor. No wheezing, rhonchi or rales.      Comments: Coarse breath sounds, sounds upper respiratory  Abdominal:      General: Abdomen is flat.      Palpations: Abdomen is soft.   Musculoskeletal:         General: Normal range of motion.      Cervical back: Normal range of motion and neck supple.      Right lower leg: No edema.      Left lower leg: No edema.   Lymphadenopathy:      Cervical: No cervical adenopathy.   Skin:     General: Skin is warm.      Coloration: Skin is not jaundiced or pale.      Findings: No bruising, erythema or lesion.   Neurological:      General: No focal deficit present.      Mental Status: She is alert and oriented to person, place, and time. Mental status is at baseline.      Cranial Nerves: No cranial nerve deficit.      Motor: No weakness.   Psychiatric:         Mood and Affect: Mood normal.         Behavior: Behavior normal.         Thought Content: Thought content normal.          Discussion with Family: Patient declined call to . "     Discharge instructions/Information to patient and family:   See after visit summary for information provided to patient and family.      Provisions for Follow-Up Care:  See after visit summary for information related to follow-up care and any pertinent home health orders.      Mobility at time of Discharge:   Basic Mobility Inpatient Raw Score: 24  JH-HLM Goal: 8: Walk 250 feet or more  JH-HLM Achieved: 7: Walk 25 feet or more  HLM Goal achieved. Continue to encourage appropriate mobility.     Disposition:   Home    Planned Readmission: No     Discharge Statement:  I spent 60 minutes discharging the patient. This time was spent on the day of discharge. I had direct contact with the patient on the day of discharge. Greater than 50% of the total time was spent examining patient, answering all patient questions, arranging and discussing plan of care with patient as well as directly providing post-discharge instructions.  Additional time then spent on discharge activities.    Discharge Medications:  See after visit summary for reconciled discharge medications provided to patient and/or family.      **Please Note: This note may have been constructed using a voice recognition system**

## 2024-02-27 NOTE — ASSESSMENT & PLAN NOTE
Present on admission, evidenced by tachycardia and tachypnea  Lactic acid within normal limits, procalcitonin within normal limits on admission, however up trended to 0.53 in a.m.  Received 1 x dose of Azithromycin/Ceftriaxone in the ER and was then monitored off IV antibiotics however given Pro-Luke IV antibiotics initiated  Suspicious of viral process, however procalcitonin elevated to 0.53 and antibiotics were started  CT Chest demonstrated right lower lobe bronchiolitis  Blood cultures no growth after 24 hours  Strep and Legionella urine studies negative

## 2024-02-27 NOTE — PLAN OF CARE
Problem: PAIN - ADULT  Goal: Verbalizes/displays adequate comfort level or baseline comfort level  Description: Interventions:  - Encourage patient to monitor pain and request assistance  - Assess pain using appropriate pain scale  - Administer analgesics based on type and severity of pain and evaluate response  - Implement non-pharmacological measures as appropriate and evaluate response  - Consider cultural and social influences on pain and pain management  - Notify physician/advanced practitioner if interventions unsuccessful or patient reports new pain  2/27/2024 1356 by Gela Gonzalez  Outcome: Progressing  2/27/2024 1243 by Gela Gonzalez  Outcome: Progressing     Problem: INFECTION - ADULT  Goal: Absence or prevention of progression during hospitalization  Description: INTERVENTIONS:  - Assess and monitor for signs and symptoms of infection  - Monitor lab/diagnostic results  - Monitor all insertion sites, i.e. indwelling lines, tubes, and drains  - Monitor endotracheal if appropriate and nasal secretions for changes in amount and color  - [place] appropriate cooling/warming therapies per order  - Administer medications as ordered  - Instruct and encourage patient and family to use good hand hygiene technique  - Identify and instruct in appropriate isolation precautions for identified infection/condition  2/27/2024 1356 by Gela Gonzalez  Outcome: Progressing  2/27/2024 1243 by Gela Gonzalez  Outcome: Progressing  Goal: Absence of fever/infection during neutropenic period  Description: INTERVENTIONS:  - Monitor WBC    2/27/2024 1356 by Gela Gonzalez  Outcome: Progressing  2/27/2024 1243 by Gela Gonzalez  Outcome: Progressing     Problem: SAFETY ADULT  Goal: Patient will remain free of falls  Description: INTERVENTIONS:  - Educate patient/family on patient safety including physical limitations  - Instruct patient to call for assistance with activity   - Consult OT/PT to assist with strengthening/mobility   - Keep  Call bell within reach  - Keep bed low and locked with side rails adjusted as appropriate  - Keep care items and personal belongings within reach  - Initiate and maintain comfort rounds  - Apply yellow socks and bracelet for high fall risk patients  - Consider moving patient to room near nurses station  2/27/2024 1356 by Gela Gonzalez  Outcome: Progressing  2/27/2024 1243 by Gela Gonzalez  Outcome: Progressing  Goal: Maintain or return to baseline ADL function  Description: INTERVENTIONS:  -  Assess patient's ability to carry out ADLs; assess patient's baseline for ADL function and identify physical deficits which impact ability to perform ADLs (bathing, care of mouth/teeth, toileting, grooming, dressing, etc.)  - Assess/evaluate cause of self-care deficits   - Assess range of motion  - Assess patient's mobility; develop plan if impaired  - Assess patient's need for assistive devices and provide as appropriate  - Encourage maximum independence but intervene and supervise when necessary  - Involve family in performance of ADLs  - Assess for home care needs following discharge   - Consider OT consult to assist with ADL evaluation and planning for discharge  - Provide patient education as appropriate  2/27/2024 1356 by Gela Gonazlez  Outcome: Progressing  2/27/2024 1243 by Gela Gonzalez  Outcome: Progressing  Goal: Maintains/Returns to pre admission functional level  Description: INTERVENTIONS:  - Perform AM-PAC 6 Click Basic Mobility/ Daily Activity assessment daily.  - Set and communicate daily mobility goal to care team and patient/family/caregiver.   - Collaborate with rehabilitation services on mobility goals if consulted  - Perform Range of Motion 3 times a day.  - Reposition patient every 3 hours.  - Dangle patient 3 times a day  - Stand patient 3 times a day  - Ambulate patient 3 times a day  - Out of bed to chair 3 times a day   - Out of bed for meals 3 times a day  - Out of bed for toileting  - Record patient  progress and toleration of activity level   2/27/2024 1356 by Gela Gonzalez  Outcome: Progressing  2/27/2024 1243 by Gela Gonzalez  Outcome: Progressing     Problem: DISCHARGE PLANNING  Goal: Discharge to home or other facility with appropriate resources  Description: INTERVENTIONS:  - Identify barriers to discharge w/patient and caregiver  - Arrange for needed discharge resources and transportation as appropriate  - Identify discharge learning needs (meds, wound care, etc.)  - Arrange for interpretive services to assist at discharge as needed  - Refer to Case Management Department for coordinating discharge planning if the patient needs post-hospital services based on physician/advanced practitioner order or complex needs related to functional status, cognitive ability, or social support system  2/27/2024 1356 by Gela Gonzalez  Outcome: Progressing  2/27/2024 1243 by Gela Gonzalez  Outcome: Progressing     Problem: Knowledge Deficit  Goal: Patient/family/caregiver demonstrates understanding of disease process, treatment plan, medications, and discharge instructions  Description: Complete learning assessment and assess knowledge base.  Interventions:  - Provide teaching at level of understanding  - Provide teaching via preferred learning methods  2/27/2024 1356 by Gela Gonzalez  Outcome: Progressing  2/27/2024 1243 by Gela Gonzalez  Outcome: Progressing

## 2024-02-27 NOTE — PLAN OF CARE
Problem: PAIN - ADULT  Goal: Verbalizes/displays adequate comfort level or baseline comfort level  Description: Interventions:  - Encourage patient to monitor pain and request assistance  - Assess pain using appropriate pain scale  - Administer analgesics based on type and severity of pain and evaluate response  - Implement non-pharmacological measures as appropriate and evaluate response  - Consider cultural and social influences on pain and pain management  - Notify physician/advanced practitioner if interventions unsuccessful or patient reports new pain  2/27/2024 1357 by Gela Gonzalez  Outcome: Adequate for Discharge  2/27/2024 1356 by Gela Gonzalez  Outcome: Progressing  2/27/2024 1243 by Gela Gonzalez  Outcome: Progressing     Problem: INFECTION - ADULT  Goal: Absence or prevention of progression during hospitalization  Description: INTERVENTIONS:  - Assess and monitor for signs and symptoms of infection  - Monitor lab/diagnostic results  - Monitor all insertion sites, i.e. indwelling lines, tubes, and drains  - Monitor endotracheal if appropriate and nasal secretions for changes in amount and color  - [place] appropriate cooling/warming therapies per order  - Administer medications as ordered  - Instruct and encourage patient and family to use good hand hygiene technique  - Identify and instruct in appropriate isolation precautions for identified infection/condition  2/27/2024 1357 by Gela Gonzalez  Outcome: Adequate for Discharge  2/27/2024 1356 by Gela Gonzalez  Outcome: Progressing  2/27/2024 1243 by Gela Gonzalez  Outcome: Progressing  Goal: Absence of fever/infection during neutropenic period  Description: INTERVENTIONS:  - Monitor WBC    2/27/2024 1357 by Gela Gonzalez  Outcome: Adequate for Discharge  2/27/2024 1356 by Gela Gonzalez  Outcome: Progressing  2/27/2024 1243 by Gela Gonzalez  Outcome: Progressing     Problem: SAFETY ADULT  Goal: Patient will remain free of falls  Description: INTERVENTIONS:  -  Educate patient/family on patient safety including physical limitations  - Instruct patient to call for assistance with activity   - Consult OT/PT to assist with strengthening/mobility   - Keep Call bell within reach  - Keep bed low and locked with side rails adjusted as appropriate  - Keep care items and personal belongings within reach  - Initiate and maintain comfort rounds  - Apply yellow socks and bracelet for high fall risk patients  - Consider moving patient to room near nurses station  2/27/2024 1357 by Gela Gonzalez  Outcome: Adequate for Discharge  2/27/2024 1356 by Gela oGnzalez  Outcome: Progressing  2/27/2024 1243 by Gela Gonzalez  Outcome: Progressing  Goal: Maintain or return to baseline ADL function  Description: INTERVENTIONS:  -  Assess patient's ability to carry out ADLs; assess patient's baseline for ADL function and identify physical deficits which impact ability to perform ADLs (bathing, care of mouth/teeth, toileting, grooming, dressing, etc.)  - Assess/evaluate cause of self-care deficits   - Assess range of motion  - Assess patient's mobility; develop plan if impaired  - Assess patient's need for assistive devices and provide as appropriate  - Encourage maximum independence but intervene and supervise when necessary  - Involve family in performance of ADLs  - Assess for home care needs following discharge   - Consider OT consult to assist with ADL evaluation and planning for discharge  - Provide patient education as appropriate  2/27/2024 1357 by Gela Gonzalez  Outcome: Adequate for Discharge  2/27/2024 1356 by Gela Gonzalez  Outcome: Progressing  2/27/2024 1243 by Gela Gonzalez  Outcome: Progressing  Goal: Maintains/Returns to pre admission functional level  Description: INTERVENTIONS:  - Perform AM-PAC 6 Click Basic Mobility/ Daily Activity assessment daily.  - Set and communicate daily mobility goal to care team and patient/family/caregiver.   - Collaborate with rehabilitation services on  mobility goals if consulted  - Perform Range of Motion 3 times a day.  - Reposition patient every 3 hours.  - Dangle patient 3 times a day  - Stand patient 3 times a day  - Ambulate patient 3 times a day  - Out of bed to chair 3 times a day   - Out of bed for meals 3 times a day  - Out of bed for toileting  - Record patient progress and toleration of activity level   2/27/2024 1357 by Gela Gonzalez  Outcome: Adequate for Discharge  2/27/2024 1356 by Gela Gonzalez  Outcome: Progressing  2/27/2024 1243 by Gela Gonzalez  Outcome: Progressing     Problem: DISCHARGE PLANNING  Goal: Discharge to home or other facility with appropriate resources  Description: INTERVENTIONS:  - Identify barriers to discharge w/patient and caregiver  - Arrange for needed discharge resources and transportation as appropriate  - Identify discharge learning needs (meds, wound care, etc.)  - Arrange for interpretive services to assist at discharge as needed  - Refer to Case Management Department for coordinating discharge planning if the patient needs post-hospital services based on physician/advanced practitioner order or complex needs related to functional status, cognitive ability, or social support system  2/27/2024 1357 by Gela Gonzalez  Outcome: Adequate for Discharge  2/27/2024 1356 by Gela Gonzalez  Outcome: Progressing  2/27/2024 1243 by Gela Gonzalez  Outcome: Progressing     Problem: Knowledge Deficit  Goal: Patient/family/caregiver demonstrates understanding of disease process, treatment plan, medications, and discharge instructions  Description: Complete learning assessment and assess knowledge base.  Interventions:  - Provide teaching at level of understanding  - Provide teaching via preferred learning methods  2/27/2024 1357 by Gela Gonzalez  Outcome: Adequate for Discharge  2/27/2024 1356 by Gela Gonzalez  Outcome: Progressing  2/27/2024 1243 by Gela Gonzalez  Outcome: Progressing

## 2024-02-27 NOTE — RESPIRATORY THERAPY NOTE
RT Protocol Note  Abbi Claudio 63 y.o. female MRN: [id number]  Unit/Bed#: -01 Encounter: [id number]    Assessment    Principal Problem:    Acute respiratory failure with hypoxia (HCC)  Active Problems:    SIRS (systemic inflammatory response syndrome) (HCC)    Serum total bilirubin elevated    Viral bronchitis      Home Pulmonary Medications:     02/26/24 2003   Respiratory Protocol   Protocol Initiated? No   Protocol Selection Respiratory   Language Barrier? No   Medical & Social History Reviewed? Yes   Diagnostic Studies Reviewed? Yes   Physical Assessment Performed? Yes   Respiratory Plan Mild Distress pathway   Respiratory Assessment   Assessment Type During-treatment   General Appearance Alert;Awake   Respiratory Pattern Normal   Chest Assessment Chest expansion symmetrical   Bilateral Breath Sounds Diminished;Coarse   Cough Non-productive   Resp Comments Wikll continue with current tx plan   O2 Device RA   Cough Description   Sputum Amount None   Additional Assessments   Pulse 64   Respirations 20   SpO2 95 %            Past Medical History:   Diagnosis Date    Fatigue     No pertinent past medical history     SOB (shortness of breath)      Social History     Socioeconomic History    Marital status: /Civil Union     Spouse name: None    Number of children: None    Years of education: None    Highest education level: None   Occupational History    None   Tobacco Use    Smoking status: Never    Smokeless tobacco: Never   Vaping Use    Vaping status: Never Used   Substance and Sexual Activity    Alcohol use: Not Currently    Drug use: Not Currently    Sexual activity: None   Other Topics Concern    None   Social History Narrative    Lives with      Nurse with [employer]      Social Determinants of Health     Financial Resource Strain: Not on file   Food Insecurity: No Food Insecurity (2/25/2024)    Hunger Vital Sign     Worried About Running Out of Food in the Last Year: Never  "true     Ran Out of Food in the Last Year: Never true   Transportation Needs: No Transportation Needs (2/25/2024)    PRAPARE - Transportation     Lack of Transportation (Medical): No     Lack of Transportation (Non-Medical): No   Physical Activity: Not on file   Stress: Not on file   Social Connections: Not on file   Intimate Partner Violence: Not on file   Housing Stability: Unknown (2/25/2024)    Housing Stability Vital Sign     Unable to Pay for Housing in the Last Year: Not on file     Number of Places Lived in the Last Year: 1     Unstable Housing in the Last Year: No       Subjective    Subjective Data: awake and alert    Objective    Physical Exam:   Assessment Type: During-treatment  General Appearance: Alert, Awake  Respiratory Pattern: Normal  Chest Assessment: Chest expansion symmetrical  Bilateral Breath Sounds: Diminished, Coarse  Cough: Non-productive  O2 Device: RA    Vitals:  Blood pressure 99/59, pulse 64, temperature 98.2 °F (36.8 °C), resp. rate 20, height 5' 9\" (1.753 m), weight 90.8 kg (200 lb 2.8 oz), SpO2 95%, not currently breastfeeding.          Imaging and other studies: I have personally reviewed pertinent reports.      O2 Device: RA     Plan    Respiratory Plan: Mild Distress pathway        Resp Comments: Ashely continue with current tx plan   "

## 2024-02-27 NOTE — PLAN OF CARE
Problem: PAIN - ADULT  Goal: Verbalizes/displays adequate comfort level or baseline comfort level  Description: Interventions:  - Encourage patient to monitor pain and request assistance  - Assess pain using appropriate pain scale  - Administer analgesics based on type and severity of pain and evaluate response  - Implement non-pharmacological measures as appropriate and evaluate response  - Consider cultural and social influences on pain and pain management  - Notify physician/advanced practitioner if interventions unsuccessful or patient reports new pain  Outcome: Progressing     Problem: INFECTION - ADULT  Goal: Absence or prevention of progression during hospitalization  Description: INTERVENTIONS:  - Assess and monitor for signs and symptoms of infection  - Monitor lab/diagnostic results  - Monitor all insertion sites, i.e. indwelling lines, tubes, and drains  - Monitor endotracheal if appropriate and nasal secretions for changes in amount and color  - [place] appropriate cooling/warming therapies per order  - Administer medications as ordered  - Instruct and encourage patient and family to use good hand hygiene technique  - Identify and instruct in appropriate isolation precautions for identified infection/condition  Outcome: Progressing  Goal: Absence of fever/infection during neutropenic period  Description: INTERVENTIONS:  - Monitor WBC    Outcome: Progressing     Problem: SAFETY ADULT  Goal: Patient will remain free of falls  Description: INTERVENTIONS:  - Educate patient/family on patient safety including physical limitations  - Instruct patient to call for assistance with activity   - Consult OT/PT to assist with strengthening/mobility   - Keep Call bell within reach  - Keep bed low and locked with side rails adjusted as appropriate  - Keep care items and personal belongings within reach  - Initiate and maintain comfort rounds  - Make Fall Risk Sign visible to staff  - Apply yellow socks and bracelet  for high fall risk patients  - Consider moving patient to room near nurses station  Outcome: Progressing  Goal: Maintain or return to baseline ADL function  Description: INTERVENTIONS:  -  Assess patient's ability to carry out ADLs; assess patient's baseline for ADL function and identify physical deficits which impact ability to perform ADLs (bathing, care of mouth/teeth, toileting, grooming, dressing, etc.)  - Assess/evaluate cause of self-care deficits   - Assess range of motion  - Assess patient's mobility; develop plan if impaired  - Assess patient's need for assistive devices and provide as appropriate  - Encourage maximum independence but intervene and supervise when necessary  - Involve family in performance of ADLs  - Assess for home care needs following discharge   - Consider OT consult to assist with ADL evaluation and planning for discharge  - Provide patient education as appropriate  Outcome: Progressing     Problem: DISCHARGE PLANNING  Goal: Discharge to home or other facility with appropriate resources  Description: INTERVENTIONS:  - Identify barriers to discharge w/patient and caregiver  - Arrange for needed discharge resources and transportation as appropriate  - Identify discharge learning needs (meds, wound care, etc.)  - Arrange for interpretive services to assist at discharge as needed  - Refer to Case Management Department for coordinating discharge planning if the patient needs post-hospital services based on physician/advanced practitioner order or complex needs related to functional status, cognitive ability, or social support system  Outcome: Progressing     Problem: Knowledge Deficit  Goal: Patient/family/caregiver demonstrates understanding of disease process, treatment plan, medications, and discharge instructions  Description: Complete learning assessment and assess knowledge base.  Interventions:  - Provide teaching at level of understanding  - Provide teaching via preferred learning  methods  Outcome: Progressing

## 2024-02-27 NOTE — UTILIZATION REVIEW
"Continued Stay Review    Date: 02/27/2024                          Current Patient Class: Observation  Current Level of Care: Med/Surg    HPI:63 y.o. female initially admitted on 02/25/2024     Assessment/Plan: Acute Respiratory Failure with Hypoxia.      Vital Signs: /70   Pulse 89   Temp (!) 97.2 °F (36.2 °C)   Resp 18   Ht 5' 9\" (1.753 m)   Wt 90.8 kg (200 lb 2.8 oz)   SpO2 93%   BMI 29.56 kg/m²     Pertinent Labs/Diagnostic Results:   Results from last 7 days   Lab Units 02/25/24  1650   SARS-COV-2  Negative     Results from last 7 days   Lab Units 02/27/24  0507 02/25/24  1650   WBC Thousand/uL 6.31 9.17   HEMOGLOBIN g/dL 11.2* 13.7   HEMATOCRIT % 33.5* 41.1   PLATELETS Thousands/uL 138* 205   NEUTROS ABS Thousands/µL  --  8.35*     Results from last 7 days   Lab Units 02/27/24  0507 02/25/24  1650   SODIUM mmol/L 139 136   POTASSIUM mmol/L 3.6 3.7   CHLORIDE mmol/L 107 101   CO2 mmol/L 25 24   ANION GAP mmol/L 7 11   BUN mg/dL 13 15   CREATININE mg/dL 0.53* 0.77   EGFR ml/min/1.73sq m 102 83   CALCIUM mg/dL 8.7 9.6     Results from last 7 days   Lab Units 02/25/24  1650   AST U/L 21   ALT U/L 15   ALK PHOS U/L 71   TOTAL PROTEIN g/dL 7.7   ALBUMIN g/dL 4.5   TOTAL BILIRUBIN mg/dL 1.34*     Results from last 7 days   Lab Units 02/27/24  0507 02/25/24  1650   GLUCOSE RANDOM mg/dL 94 113     Results from last 7 days   Lab Units 02/25/24  1650   PROTIME seconds 14.3   INR  1.05   PTT seconds 30     Results from last 7 days   Lab Units 02/26/24  0430 02/25/24  1650   PROCALCITONIN ng/ml 0.53* 0.12     Results from last 7 days   Lab Units 02/25/24  1650   LACTIC ACID mmol/L 1.0     Results from last 7 days   Lab Units 02/25/24  1650   INFLUENZA A PCR  Negative   INFLUENZA B PCR  Negative   RSV PCR  Negative     Results from last 7 days   Lab Units 02/25/24  1704 02/25/24  1650   BLOOD CULTURE  No Growth at 24 hrs. No Growth at 24 hrs.     Medications:   Scheduled Medications:  cefTRIAXone, 2,000 mg, " Intravenous, Q24H  ipratropium, 0.5 mg, Nebulization, BID  levalbuterol, 1.25 mg, Nebulization, BID      Continuous IV Infusions:     PRN Meds:  acetaminophen, 650 mg, Oral, Q6H PRN  albuterol, 2.5 mg, Nebulization, Q4H PRN  dextromethorphan-guaiFENesin, 10 mL, Oral, Q4H PRN  ibuprofen, 400 mg, Oral, Q6H PRN        Discharge Plan: TBD    Network Utilization Review Department  ATTENTION: Please call with any questions or concerns to [phone] and carefully listen to the prompts so that you are directed to the right person. All voicemails are confidential.   For Discharge needs, contact Care Management DC Support Team at [phone] opt. [phone]  Send all requests for admission clinical reviews, approved or denied determinations and any other requests to dedicated fax number below belonging to the [facility] [facility] where the patient is receiving treatment. List of dedicated fax numbers for the Facilities:  FACILITY NAME UR FAX NUMBER   ADMISSION DENIALS (Administrative/Medical Necessity) [phone]   DISCHARGE SUPPORT TEAM (NETWORK) [phone]   PARENT CHILD HEALTH (Maternity/NICU/Pediatrics) [phone]   [facility] [phone]   [facility] [phone]   [facility] [phone]   [facility] [phone]   [facility] [phone]   [facility] [phone]   [facility] [phone]   [facility] [phone]   [facility] [phone]   [facility] [phone]   [facility] [phone]   [facility] [phone]

## 2024-02-29 ENCOUNTER — APPOINTMENT (OUTPATIENT)
Dept: LAB | Facility: HOSPITAL | Age: 64
End: 2024-02-29
Payer: COMMERCIAL

## 2024-02-29 ENCOUNTER — HOSPITAL ENCOUNTER (OUTPATIENT)
Dept: RADIOLOGY | Facility: HOSPITAL | Age: 64
End: 2024-02-29
Payer: COMMERCIAL

## 2024-02-29 DIAGNOSIS — R58 ECCHYMOSIS: ICD-10-CM

## 2024-02-29 DIAGNOSIS — R07.81 RIB PAIN: ICD-10-CM

## 2024-02-29 DIAGNOSIS — M25.571 RIGHT ANKLE PAIN, UNSPECIFIED CHRONICITY: ICD-10-CM

## 2024-02-29 DIAGNOSIS — R05.1 ACUTE COUGH: ICD-10-CM

## 2024-02-29 LAB
ANION GAP SERPL CALCULATED.3IONS-SCNC: 9 MMOL/L
ATRIAL RATE: 108 BPM
BASOPHILS # BLD AUTO: 0.04 THOUSANDS/ÂΜL (ref 0–0.1)
BASOPHILS NFR BLD AUTO: 1 % (ref 0–1)
BUN SERPL-MCNC: 15 MG/DL (ref 5–25)
CALCIUM SERPL-MCNC: 9.2 MG/DL (ref 8.4–10.2)
CHLORIDE SERPL-SCNC: 101 MMOL/L (ref 96–108)
CO2 SERPL-SCNC: 29 MMOL/L (ref 21–32)
CREAT SERPL-MCNC: 0.65 MG/DL (ref 0.6–1.3)
EOSINOPHIL # BLD AUTO: 0.04 THOUSAND/ÂΜL (ref 0–0.61)
EOSINOPHIL NFR BLD AUTO: 1 % (ref 0–6)
ERYTHROCYTE [DISTWIDTH] IN BLOOD BY AUTOMATED COUNT: 12.4 % (ref 11.6–15.1)
GFR SERPL CREATININE-BSD FRML MDRD: 95 ML/MIN/1.73SQ M
GLUCOSE SERPL-MCNC: 103 MG/DL (ref 65–140)
HCT VFR BLD AUTO: 37.1 % (ref 34.8–46.1)
HGB BLD-MCNC: 12.1 G/DL (ref 11.5–15.4)
IMM GRANULOCYTES # BLD AUTO: 0.05 THOUSAND/UL (ref 0–0.2)
IMM GRANULOCYTES NFR BLD AUTO: 1 % (ref 0–2)
LYMPHOCYTES # BLD AUTO: 1.03 THOUSANDS/ÂΜL (ref 0.6–4.47)
LYMPHOCYTES NFR BLD AUTO: 16 % (ref 14–44)
MCH RBC QN AUTO: 30.5 PG (ref 26.8–34.3)
MCHC RBC AUTO-ENTMCNC: 32.6 G/DL (ref 31.4–37.4)
MCV RBC AUTO: 94 FL (ref 82–98)
MONOCYTES # BLD AUTO: 0.16 THOUSAND/ÂΜL (ref 0.17–1.22)
MONOCYTES NFR BLD AUTO: 3 % (ref 4–12)
NEUTROPHILS # BLD AUTO: 4.98 THOUSANDS/ÂΜL (ref 1.85–7.62)
NEUTS SEG NFR BLD AUTO: 78 % (ref 43–75)
NRBC BLD AUTO-RTO: 0 /100 WBCS
P AXIS: 62 DEGREES
PLATELET # BLD AUTO: 234 THOUSANDS/UL (ref 149–390)
PMV BLD AUTO: 11.8 FL (ref 8.9–12.7)
POTASSIUM SERPL-SCNC: 3.7 MMOL/L (ref 3.5–5.3)
PR INTERVAL: 158 MS
QRS AXIS: 81 DEGREES
QRSD INTERVAL: 86 MS
QT INTERVAL: 338 MS
QTC INTERVAL: 452 MS
RBC # BLD AUTO: 3.97 MILLION/UL (ref 3.81–5.12)
SODIUM SERPL-SCNC: 139 MMOL/L (ref 135–147)
T WAVE AXIS: 49 DEGREES
VENTRICULAR RATE: 108 BPM
WBC # BLD AUTO: 6.3 THOUSAND/UL (ref 4.31–10.16)

## 2024-02-29 PROCEDURE — 71111 X-RAY EXAM RIBS/CHEST4/> VWS: CPT

## 2024-02-29 PROCEDURE — 80048 BASIC METABOLIC PNL TOTAL CA: CPT

## 2024-02-29 PROCEDURE — 36415 COLL VENOUS BLD VENIPUNCTURE: CPT

## 2024-02-29 PROCEDURE — 93010 ELECTROCARDIOGRAM REPORT: CPT | Performed by: INTERNAL MEDICINE

## 2024-02-29 PROCEDURE — 73610 X-RAY EXAM OF ANKLE: CPT

## 2024-02-29 PROCEDURE — 85025 COMPLETE CBC W/AUTO DIFF WBC: CPT

## 2024-03-02 LAB
BACTERIA BLD CULT: NORMAL
BACTERIA BLD CULT: NORMAL

## 2024-03-13 ENCOUNTER — APPOINTMENT (OUTPATIENT)
Dept: LAB | Facility: CLINIC | Age: 64
End: 2024-03-13
Payer: COMMERCIAL

## 2024-03-13 DIAGNOSIS — J18.9 PNEUMONIA DUE TO INFECTIOUS ORGANISM, UNSPECIFIED LATERALITY, UNSPECIFIED PART OF LUNG: ICD-10-CM

## 2024-03-13 LAB
ANION GAP SERPL CALCULATED.3IONS-SCNC: 10 MMOL/L (ref 4–13)
BUN SERPL-MCNC: 21 MG/DL (ref 5–25)
CALCIUM SERPL-MCNC: 9.1 MG/DL (ref 8.4–10.2)
CHLORIDE SERPL-SCNC: 104 MMOL/L (ref 96–108)
CO2 SERPL-SCNC: 27 MMOL/L (ref 21–32)
CREAT SERPL-MCNC: 0.68 MG/DL (ref 0.6–1.3)
ERYTHROCYTE [DISTWIDTH] IN BLOOD BY AUTOMATED COUNT: 13.3 % (ref 11.6–15.1)
GFR SERPL CREATININE-BSD FRML MDRD: 94 ML/MIN/1.73SQ M
GLUCOSE SERPL-MCNC: 87 MG/DL (ref 65–140)
HCT VFR BLD AUTO: 39.8 % (ref 34.8–46.1)
HGB BLD-MCNC: 12.4 G/DL (ref 11.5–15.4)
MCH RBC QN AUTO: 30 PG (ref 26.8–34.3)
MCHC RBC AUTO-ENTMCNC: 31.2 G/DL (ref 31.4–37.4)
MCV RBC AUTO: 96 FL (ref 82–98)
PLATELET # BLD AUTO: 303 THOUSANDS/UL (ref 149–390)
PMV BLD AUTO: 11.3 FL (ref 8.9–12.7)
POTASSIUM SERPL-SCNC: 4.4 MMOL/L (ref 3.5–5.3)
RBC # BLD AUTO: 4.13 MILLION/UL (ref 3.81–5.12)
SODIUM SERPL-SCNC: 141 MMOL/L (ref 135–147)
WBC # BLD AUTO: 6.69 THOUSAND/UL (ref 4.31–10.16)

## 2024-03-13 PROCEDURE — 80048 BASIC METABOLIC PNL TOTAL CA: CPT

## 2024-03-13 PROCEDURE — 36415 COLL VENOUS BLD VENIPUNCTURE: CPT

## 2024-03-13 PROCEDURE — 85027 COMPLETE CBC AUTOMATED: CPT

## 2024-03-19 ENCOUNTER — OFFICE VISIT (OUTPATIENT)
Dept: OBGYN CLINIC | Facility: CLINIC | Age: 64
End: 2024-03-19
Payer: COMMERCIAL

## 2024-03-19 VITALS — WEIGHT: 197.2 LBS | BODY MASS INDEX: 29.21 KG/M2 | HEIGHT: 69 IN

## 2024-03-19 DIAGNOSIS — M20.41 HAMMERTOE OF SECOND TOE OF RIGHT FOOT: ICD-10-CM

## 2024-03-19 DIAGNOSIS — S93.491A SPRAIN OF ANTERIOR TALOFIBULAR LIGAMENT OF RIGHT ANKLE, INITIAL ENCOUNTER: Primary | ICD-10-CM

## 2024-03-19 PROCEDURE — 99213 OFFICE O/P EST LOW 20 MIN: CPT | Performed by: ORTHOPAEDIC SURGERY

## 2024-03-19 NOTE — PROGRESS NOTES
James R Lachman, M.D.  Attending, Orthopaedic Surgery  Foot and Ankle  [facility]        ORTHOPAEDIC FOOT AND ANKLE CLINIC VISIT     Assessment:     Encounter Diagnoses   Name Primary?    Sprain of anterior talofibular ligament of right ankle, initial encounter Yes    Hammertoe of second toe of right foot               Plan:   The patient verbalized understanding of exam findings and treatment plan. We engaged in the shared decision-making process and treatment options were discussed at length with the patient. Surgical and conservative management discussed today along with risks and benefits.  Patient has slight weakness of her right ankle when compared to the contralateral side. The pathoanatomy and natural history of this diagnosis was explained to the patient in detail today in the office. A differential diagnosis could be peroneal tendonitis.   A referral to formal physical therapy was provided for the patient today.  Continue to wear supportive shoe wear and may begin use of compression stocking if swelling is an issue  Also, patient also has a hammertoe of her second toe on her right foot. She was provided with a toe sleeve to wear in her supportive shoes to reduce the friction. May also perform stretches at home for the second toe  If her symptoms persist about the ankle then she would require an MRI to further evaluate internal structures.   Return in about 7 weeks (around 5/7/2024). If she still has pain we will obtain weight bearing x rays of her right ankle w/ comparison at her next visit      History of Present Illness:   Chief Complaint:   Chief Complaint   Patient presents with    Right Ankle - Pain     Pain in the ankle. Shoes seem to help her out. Some days can't walk on it. Already had 2 surgery's on it.      Abbi Claudio is a 63 y.o. female who is being seen for right ankle pain. Patient reports pain about the anterolateral ankle for a few months. She reports 2  previous surgeries to this ankle about 15-20 years ago. One was for an ankle fracture then a year later, she states that Pain is localized at anterolateral ankle with minimal radiating and described as sharp and severe. Patient denies numbness, tingling or radicular pain.  Denies history of neuropathy.  Patient does not smoke, does not have diabetes and does not take blood thinners.  Patient denies family history of anesthesia complications and has not had any complications with anesthesia.     Pain/symptom timing:  Worse during the day when active  Pain/symptom context:  Worse with activites and work  Pain/symptom modifying factors:  Rest makes better, activities make worse  Pain/symptom associated signs/symptoms: none    Prior treatment   NSAIDsYes    Injections No   Bracing/Orthotics No   Physical Therapy No     Orthopedic Surgical History:   See below    Past Medical, Surgical and Social History:  Past Medical History:  has a past medical history of Fatigue, No pertinent past medical history, and SOB (shortness of breath).  Problem List: does not have any pertinent problems on file.  Past Surgical History:  has a past surgical history that includes Tubal ligation; Varicose vein surgery; Thyroidectomy, partial; Rotator cuff repair (Right); Ankle surgery; Mass excision (Right, 5/13/2022); and Chest wall biopsy (Left, 5/13/2022).  Family History: family history includes Diabetes in her father; Lymphoma in her daughter and sister; No Known Problems in her daughter, daughter, maternal aunt, maternal grandfather, maternal grandmother, mother, paternal grandfather, paternal grandmother, and sister.  Social History:  reports that she has never smoked. She has never used smokeless tobacco. She reports that she does not currently use alcohol. She reports that she does not currently use drugs.  Current Medications: has a current medication list which includes the following prescription(s): albuterol.  Allergies: has No  "Known Allergies.     Review of Systems:  General- denies fever/chills  HEENT- denies hearing loss or sore throat  Eyes- denies eye pain or visual disturbances, denies red eyes  Respiratory- denies cough or SOB  Cardio- denies chest pain or palpitations  GI- denies abdominal pain  Endocrine- denies urinary frequency  Urinary- denies pain with urination  Musculoskeletal- Negative except noted above  Skin- denies rashes or wounds  Neurological- denies dizziness or headache  Psychiatric- denies anxiety or difficulty concentrating    Physical Exam:   Ht 5' 9\" (1.753 m)   Wt 89.4 kg (197 lb 3.2 oz)   BMI 29.12 kg/m²   General/Constitutional: No apparent distress: well-nourished and well developed.  Eyes: normal ocular motion  Cardio: RRR, Normal S1S2, No m/r/g  Lymphatic: No appreciable lymphadenopathy  Respiratory: Non-labored breathing, CTA b/l no w/c/r  Vascular: No edema, swelling or tenderness, except as noted in detailed exam.  Integumentary: No impressive skin lesions present, except as noted in detailed exam.  Neuro: No ataxia or tremors noted  Psych: Normal mood and affect, oriented to person, place and time. Appropriate affect.  Musculoskeletal: Normal, except as noted in detailed exam and in HPI.    Examination    Right    Gait Normal   Musculoskeletal Tender to palpation at ATFL    Skin Normal.      Nails Normal    Range of Motion  20 degrees dorsiflexion, 30 degrees plantarflexion  Subtalar motion: normal    Stability Stable    Muscle Strength 5/5 tibialis anterior  5/5 gastrocnemius-soleus  5/5 posterior tibialis  5-/5 peroneal/eversion strength  5/5 EHL  5/5 FHL    Neurologic Normal    Sensation Intact to light touch throughout sural, saphenous, superficial peroneal, deep peroneal and medial/lateral plantar nerve distributions.  Worthington-Alyssia 5.07 filament (10g) testing deferred.    Cardiovascular Brisk capillary refill < 2 seconds,intact DP and PT pulses    Special Tests None      Imaging Studies:   3 " views of the right ankle were taken, reviewed and interpreted independently that demonstrate mild tibiotalar joint osteoarthritis. Chronic medial talar dome OCD. No other acute osseous abnormalities. Reviewed by me personally.        James R. Lachman, MD  Foot & Ankle Surgery   Department of Orthopaedic Surgery  [facility]      I personally performed the service.    James R. Lachman, MD    Scribe Attestation      I,:  Ceasar Walker am acting as a scribe while in the presence of the attending physician.:       I,:  James R Lachman, MD personally performed the services described in this documentation    as scribed in my presence.:

## 2024-03-21 ENCOUNTER — EVALUATION (OUTPATIENT)
Dept: PHYSICAL THERAPY | Age: 64
End: 2024-03-21
Payer: COMMERCIAL

## 2024-03-21 DIAGNOSIS — R29.898 ANKLE WEAKNESS: Primary | ICD-10-CM

## 2024-03-21 DIAGNOSIS — S93.491A SPRAIN OF ANTERIOR TALOFIBULAR LIGAMENT OF RIGHT ANKLE, INITIAL ENCOUNTER: ICD-10-CM

## 2024-03-21 PROCEDURE — 97161 PT EVAL LOW COMPLEX 20 MIN: CPT

## 2024-03-21 PROCEDURE — 97530 THERAPEUTIC ACTIVITIES: CPT

## 2024-03-21 PROCEDURE — 97110 THERAPEUTIC EXERCISES: CPT

## 2024-03-21 NOTE — PROGRESS NOTES
PT Evaluation     Today's date: 3/21/2024  Patient name: Abbi Claudio  : 1960  MRN: [id number]  Referring provider: Lachman, James R, MD  Dx:   Encounter Diagnosis     ICD-10-CM    1. Ankle weakness  R29.898       2. Sprain of anterior talofibular ligament of right ankle, initial encounter  S93.491A Ambulatory Referral to Physical Therapy          Start Time: 07  Stop Time: 0800  Total time in clinic (min): 55 minutes    Assessment  Assessment details: Pt presents to therapy with c/o of inconsistent ankle pain. Completed functional testing, myotome strength testing, specific MMTs and special tests for the R ankle. First observed patients foot arches, the patient has decreased arch height ad she wears no orthotics for support. While complete gastroc MMT the patient was only able to complete 10 heel raises when pain started compared to the 20 on the L. With palpation the patient had pain at the medial calcaneous, distal achilles, and lateral malleolus. Special testing revealed negative windlass test is sitting and standing, despite the pts subjective note of occasional arch pain and cramps, negative for pain with inversion and PF. The patient had a positive anterior drawer and discomfort while assess R calf length. Based on these findings in consultation with her imagining, the patient has inflammation at the distal achilles, weakness of the R gastroc. These findings are creating activity limitations and participation restrictions for the patient. Activity limitations such as descending stairs. Participation restrictions such as playing with her grandchildren. The patient will benefit from skilled physical therapy to strength the ankle and hips, improve balance, and functional mobility.   Impairments: activity intolerance, impaired balance, impaired physical strength, lacks appropriate home exercise program, pain with function and weight-bearing intolerance    Symptom irritability:  moderateUnderstanding of Dx/Px/POC: good   Prognosis: good    Goals  In 6 visits:  Administer FOTO  Pt will pass all conditions of mCTSIB without hesitation to work toward long term goals.   Pt will be able to descend stairs using a reciprocal gait pattern        In 12 visits:  Pt will demonstrate 100% competency of HEP to be appropriate for D/C from therapy after goals have been met, pt is functioning at PLOF, and/or the pt displays significant improvement.    Pt will be able to complete 20 SL heel raises on the R to demonstrate muscular gains of the R gastroc   Pt will be able to balance on R leg for 15 seconds    Plan  Plan details: Pt was educated on the nature of their dx and the benefits of completing physical therapy. Additionally, pt is encouraged to ask questions regarding their dx and POC.     Pt was in agreement that they will be treated by myself in combination with a PTA. Further, informed that we work as a team, the PTAs follow the POC created by the PT, and I trust them to make safe and reasonable changes when appropriate under their scope of practice.     Patient would benefit from: skilled physical therapy  Referral necessary: No  Planned therapy interventions: activity modification, ADL retraining, ADL training, abdominal trunk stabilization, IASTM, joint mobilization, kinesiology taping, manual therapy, massage, muscle pump exercises, neuromuscular re-education, fine motor coordination training, flexibility, functional ROM exercises, gait training, graded activity, graded exercise, graded motor, home exercise program, IADL retraining, transfer training, therapeutic training, therapeutic exercise, therapeutic activities, stretching and patient education  Frequency: 2x week  Duration in visits: 12  Duration in weeks: 6  Plan of Care beginning date: 3/21/2024  Plan of Care expiration date: 6/19/2024  Treatment plan discussed with: patient    Subjective Evaluation    History of Present  Illness  Mechanism of injury: A few months ago this pain in her R ankle started with no ELLEN. She does not use orthotics, she uses a compression sock sleeve that helps stabilize her muscle and provide her with walking confidence. The pain in her ankle is inconsistently provoked and when exacerbated has a hard time accepting weight on R to ambulate down stairs, therefore; causing her to walk step to pattern. Pt identifies the pain to be at the medial calcaneus, lower achilles, and lateral malleoli. Pt had imaging that was reviewed with her by referring physician and she demonstrates knowledge of the results and the anatomy of the ankle. He goals include and ankle and hip strengthening to decrease pain and maintain her PLOF.           Recurrent probem    Quality of life: good    Patient Goals  Patient goals for therapy: decreased pain, increased strength, independence with ADLs/IADLs, return to sport/leisure activities, increased motion and improved balance  Patient goal: gain strength to get back to walking  Pain  Current pain ratin  At best pain ratin  At worst pain ratin  Location: achilles, surrounding the foot  Quality: sharp  Relieving factors: heat, ice, change in position, rest and medications  Aggravating factors: walking  Progression: worsening    Social Support  Steps to enter house: yes  0  Stairs in house: yes   12  Lives in: one-story house  Lives with: spouse    Employment status: working (home [employer])  Hand dominance: right  Exercise history: walking 2 miles 2-3x/week , job requirement, playing with grandkids      Diagnostic Tests  X-ray: normal      Objective     Palpation     Additional Palpation Details  No pain while palpating for special tests     Tenderness     Right Ankle/Foot   Tenderness in the Achilles insertion, anterior talofibular ligament, lateral malleolus and medial calcaneus. No tenderness in the fibula, medial malleolus, plantar fascia, proximal Achilles and superior  tibiofibular ligament.     Neurological Testing     Sensation     Ankle/Foot   Left Ankle/Foot   Intact: light touch    Right Ankle/Foot   Intact: light touch     Tests     Right Ankle/Foot   Positive for anterior drawer.   Negative for calcaneal squeeze, Homans' sign, posterior drawer, Torres and windlass.     Ambulation     Ambulation: Stairs   Ascend stairs: independent  Pattern: reciprocal  Railings: one rail  Descend stairs: independent  Pattern: non-reciprocal  Railings: one rail    Functional Assessment      Squat    Pain, left valgus and right valgus.     Comments  Patient primarily had pain with squatting because of her knee. She required cueing to perform a squat with proper form, once set up b/l arches collapsed and knees fell into valgus.     mCTSIB: patient passed all conditions, moderate sway with condition #4.     General Comments:      Hip Comments   Weak and painless hip flexion, 4/5     Knee Comments  Weak and painless knee flexion 4/5      Ankle/Foot Comments   See specific ankle findings   Strength: standing heel raises L = 20 / R = 10 (w/ onset of pain)              POC expires Unit limit Auth Expiration date PT/OT + Visit Limit?   6/21/2024 BOMN  BOMN                           Visit/Unit Tracking  AUTH Status:  Date 3/21              No auth needed Used 1               Remaining                 FOTO  FOTO                 Precautions:       Manuals             IASTM laser                            Neuro Re-Ed              Biodex               Modified Rom              SL stance              TherEx              PF stretch             Calf stretch             HS stretch             Mini squats              Banded PF             Calf raises with with ball seated              SL abduction             Ankle isometrics                           Side stepping             Tandem walking             Deep squat             Phase 3             Calf raises with toe in DF             Single leg eccentric  calf lowers              Step ups with PF             SL deadlifts              SL hops             Bear holds             Lunges              SL step downs             Calf press                           Modalities

## 2024-03-26 ENCOUNTER — APPOINTMENT (OUTPATIENT)
Dept: PHYSICAL THERAPY | Age: 64
End: 2024-03-26
Payer: COMMERCIAL

## 2024-03-26 DIAGNOSIS — S93.491A SPRAIN OF ANTERIOR TALOFIBULAR LIGAMENT OF RIGHT ANKLE, INITIAL ENCOUNTER: Primary | ICD-10-CM

## 2024-03-26 DIAGNOSIS — R29.898 ANKLE WEAKNESS: ICD-10-CM

## 2024-03-26 NOTE — PROGRESS NOTES
Daily Note     Today's date: 3/26/2024  Patient name: Abbi Claudio  : 1960  MRN: [id number]  Referring provider: Lachman, James R, MD  Dx:   Encounter Diagnosis     ICD-10-CM    1. Sprain of anterior talofibular ligament of right ankle, initial encounter  S93.491A       2. Ankle weakness  R29.898                      Subjective: ***      Objective: See treatment diary below      Assessment: Patient {assessment:8024242414}      Plan: {PLAN:[id number]}     POC expires Unit limit Auth Expiration date PT/OT + Visit Limit?   2024 BOMN  BOMN                           Visit/Unit Tracking  AUTH Status:  Date 3/21              No auth needed Used 1               Remaining                 FOTO  FOTO                 Precautions:       Manuals 3/21            IASTM laser                            Neuro Re-Ed              Biodex               Modified Rom              SL stance              TherEx              PF stretch             Calf stretch             Mini squats              Banded PF             Calf raises with with ball seated              SL abduction             Ankle isometrics                           Side stepping             Tandem walking             Deep squat             Phase 3             Calf raises with unilateral lower              Step ups with PF             SL deadlifts              SL hops             Bear holds             Lunges              SL step downs             Calf press             Sidesteps w/ band @ forefoot             Modalities

## 2024-03-28 ENCOUNTER — OFFICE VISIT (OUTPATIENT)
Dept: PHYSICAL THERAPY | Age: 64
End: 2024-03-28
Payer: COMMERCIAL

## 2024-03-28 DIAGNOSIS — S93.491A SPRAIN OF ANTERIOR TALOFIBULAR LIGAMENT OF RIGHT ANKLE, INITIAL ENCOUNTER: Primary | ICD-10-CM

## 2024-03-28 DIAGNOSIS — R29.898 ANKLE WEAKNESS: ICD-10-CM

## 2024-03-28 PROCEDURE — 97112 NEUROMUSCULAR REEDUCATION: CPT

## 2024-03-28 PROCEDURE — 97110 THERAPEUTIC EXERCISES: CPT

## 2024-03-28 NOTE — PROGRESS NOTES
"Daily Note     Today's date: 3/28/2024  Patient name: Abbi Claudio  : 1960  MRN: [id number]  Referring provider: Lachman, James R, MD  Dx:   Encounter Diagnosis     ICD-10-CM    1. Sprain of anterior talofibular ligament of right ankle, initial encounter  S93.491A       2. Ankle weakness  R29.898           Start Time: 07  Stop Time: 0750  Total time in clinic (min): 45 minutes    Subjective: states she does not have much pain today but her L ankle has started to bother her as well. States she went away and did a lot of walking Friday-Monday, walking about 8-9 miles per day.       Objective: See treatment diary below      Assessment: Worked on balance and proprioception in WB including tandem amb and side stepping, SLS, biodex balance machine, and tandem stance with ball pass. Challenged by tandem ambulation, HHA required for stability. Also added standing HR B with single limb ecc lowering, mild discomfort noted during ecc lowering on R. Worked on isometrics with minimal challenge, with the exception of some discomfort with R ankle PF. Cues for proper hip alignment for S/L hip abd. Minimal pain post session. Patient would benefit from continued PT.      Plan: Continue per plan of care.      POC expires Unit limit Auth Expiration date PT/OT + Visit Limit?   2024 BOMN  BOMN                           Visit/Unit Tracking  AUTH Status:  Date 3/21 3/28              No auth needed Used 1 2              Remaining                 FOTO  59(73)                 Precautions:       Manuals 3/21 3/28            IAS laser                             Neuro Re-Ed              Biodex:   Motor control      Maze control  Static x1, L3 x2    L3 3x             Modified Rom              SL stance   10\"x3            Side stepping   2 laps 30ft           Tandem ambulation  2 laps 30ft HHA            Tandem stance with ball pass   3'                         TherEx              Nustep: strength and ROM  L5 5'          " "  PF stretch             Calf stretch on slantboard  L3 30\"x4            Mini squats   20x            Banded PF             Calf raises with with ball seated   20x            SL abduction  B 20x ea           Ankle isometrics   5\"x10            Deep squat                                                     Phase 3             Calf raises with unilateral lower   15x ea           Step ups with PF             SL deadlifts              SL hops             Bear holds             Lunges              SL step downs             Calf press             Sidesteps w/ band @ forefoot             Modalities                                                                  "

## 2024-04-01 ENCOUNTER — APPOINTMENT (OUTPATIENT)
Dept: RADIOLOGY | Facility: MEDICAL CENTER | Age: 64
End: 2024-04-01
Payer: COMMERCIAL

## 2024-04-01 ENCOUNTER — APPOINTMENT (OUTPATIENT)
Dept: LAB | Facility: MEDICAL CENTER | Age: 64
End: 2024-04-01
Payer: COMMERCIAL

## 2024-04-01 DIAGNOSIS — J18.9 PNEUMONIA DUE TO INFECTIOUS ORGANISM, UNSPECIFIED LATERALITY, UNSPECIFIED PART OF LUNG: ICD-10-CM

## 2024-04-01 DIAGNOSIS — R94.6 NONSPECIFIC ABNORMAL RESULTS OF THYROID FUNCTION STUDY: ICD-10-CM

## 2024-04-01 LAB
T4 SERPL-MCNC: 9.44 UG/DL (ref 6.09–12.23)
TSH SERPL DL<=0.05 MIU/L-ACNC: 0.41 UIU/ML (ref 0.45–4.5)

## 2024-04-01 PROCEDURE — 84443 ASSAY THYROID STIM HORMONE: CPT

## 2024-04-01 PROCEDURE — 36415 COLL VENOUS BLD VENIPUNCTURE: CPT

## 2024-04-01 PROCEDURE — 71046 X-RAY EXAM CHEST 2 VIEWS: CPT

## 2024-04-01 PROCEDURE — 84436 ASSAY OF TOTAL THYROXINE: CPT

## 2024-04-02 ENCOUNTER — OFFICE VISIT (OUTPATIENT)
Dept: PHYSICAL THERAPY | Age: 64
End: 2024-04-02
Payer: COMMERCIAL

## 2024-04-02 DIAGNOSIS — R29.898 ANKLE WEAKNESS: ICD-10-CM

## 2024-04-02 DIAGNOSIS — S93.491A SPRAIN OF ANTERIOR TALOFIBULAR LIGAMENT OF RIGHT ANKLE, INITIAL ENCOUNTER: Primary | ICD-10-CM

## 2024-04-02 PROCEDURE — 97110 THERAPEUTIC EXERCISES: CPT

## 2024-04-02 PROCEDURE — 97112 NEUROMUSCULAR REEDUCATION: CPT

## 2024-04-02 NOTE — PROGRESS NOTES
"Daily Note     Today's date: 2024  Patient name: Abbi Claudio  : 1960  MRN: [id number]  Referring provider: Lachman, James R, MD  Dx:   Encounter Diagnosis     ICD-10-CM    1. Sprain of anterior talofibular ligament of right ankle, initial encounter  S93.491A       2. Ankle weakness  R29.898           Start Time: 0715  Stop Time: 0800  Total time in clinic (min): 45 minutes    Subjective: Reports some increased pain at [place] today when she woke up, felt okay after her last PT session.       Objective: See treatment diary below  Patient was seen 1:1 for 30 min.     Assessment: Cues for carryover of program and to ensure proper dosage is completed. Progressed SLS from a first surface to a foam with appropriate level of challenge. Relief felt post laser treatment. Will progress next session if tolerated. Patient would benefit from continued PT      Plan: Continue per plan of care.      POC expires Unit limit Auth Expiration date PT/OT + Visit Limit?   2024 BOMN  BOMN                           Visit/Unit Tracking  AUTH Status:  Date 3/21 3/28  4/2            No auth needed Used 1 2 3             Remaining                 FOTO  59(73)                 Precautions:       Manuals 3/21 3/28  4/2         IASTM Laser:    200cm2  25W  15J/cm2      2' R                        Neuro Re-Ed             Biodex:   Motor control      Maze control  Static x1, L3 x2    L3 3x  L3 3x ea          Modified Rom             SL stance   10\"x3  Foam 10\"x3         Side stepping   2 laps 30ft 3 laps 30 ft         Tandem ambulation  2 laps 30ft HHA  5 laps at bar         Tandem stance with ball pass   3'  Peg board blue 2x                      TherEx             Nustep: strength and ROM  L5 5'  L5 5'          PF stretch            Calf stretch on slantboard  L3 30\"x4  L3 30\"x4         Mini squats   20x  20x          Banded PF            Calf raises with with ball seated   20x  NT         SL abduction  B 20x ea B 20x ea  " "        Ankle isometrics   5\"x10  D/C          Deep squat             HR    20x                                  Phase 3            Calf raises with unilateral lower   15x ea 15x ea         Step ups with PF            SL deadlifts             SL hops            Bear holds            Lunges             SL step downs            Calf press            Sidesteps w/ band @ forefoot            Modalities                                                               "

## 2024-04-04 ENCOUNTER — OFFICE VISIT (OUTPATIENT)
Dept: PHYSICAL THERAPY | Age: 64
End: 2024-04-04
Payer: COMMERCIAL

## 2024-04-04 DIAGNOSIS — R29.898 ANKLE WEAKNESS: Primary | ICD-10-CM

## 2024-04-04 PROCEDURE — 97112 NEUROMUSCULAR REEDUCATION: CPT

## 2024-04-04 PROCEDURE — 97110 THERAPEUTIC EXERCISES: CPT

## 2024-04-04 NOTE — PROGRESS NOTES
"Daily Note     Today's date: 2024  Patient name: Abbi Claudio  : 1960  MRN: [id number]  Referring provider: Lachman, James R, MD  Dx:   Encounter Diagnosis     ICD-10-CM    1. Ankle weakness  R29.898           Start Time: 0700  Stop Time: 0745  Total time in clinic (min): 45 minutes    Subjective: She has no pain today. The b/l plantar flexion raise and eccentric lower unilaterally along with the Biodex is most difficult. She can't do a lot of the HEP at home due to long work hours. She likes the PF and calf stretch. The pain is present in both feet when she experiences it.       Objective: See treatment diary below      Assessment: Added seated weighted dorsiflexion and progressed sidelying abduction to standing abduction with a band around the ankles. Standing abduction works on balance, hip and ankle strengthening. Additionally, progressed squats to a seat of increased depth and biofeedback. Regressed Biodex to static surface for motor control and maze control but with no hands. Pt had Tolerated treatment well. Patient demonstrated fatigue post treatment      Plan: Continue per plan of care.      POC expires Unit limit Auth Expiration date PT/OT + Visit Limit?   2024 BOMN  BOMN                           Visit/Unit Tracking  AUTH Status:  Date 3/21 3/28  4/2 4/4           No auth needed Used 1 2 3 4            Remaining                 FOTO  59(73)                 Precautions:       Manuals 3/21 3/28  4/2 4/4        IASTM Laser:    200cm2  25W  15J/cm2      2' R                        Neuro Re-Ed             Biodex:   Motor control      Maze control  Static x1, L3 x2    L3 3x  L3 3x ea 3x ea static no hands         Modified Rom             SL stance   10\"x3  Foam 10\"x3         Side stepping   2 laps 30ft 3 laps 30 ft 3 laps 20ft         Tandem ambulation  2 laps 30ft HHA  5 laps at bar 5 laps at bar         Tandem stance with ball pass   3'  Peg board blue 2x                      TherEx      " "       Nustep: strength and ROM  L5 5'  L5 5'  L5 5'        Calf stretch on slantboard  L3 30\"x4  L3 30\"x4 4x30\"         Mini squats   20x  20x  2x10         Calf raises with with ball seated   20x  NT 2x15         SL abduction  B 20x ea B 20x ea  Standing ytb ankles 2x8         Self talocural stretch    10x 10\"         Weighted DF     2x10 ea                    Phase 3            Calf raises with unilateral lower   15x ea 15x ea         Step ups with PF            SL deadlifts             SL hops            Bear holds            Lunges             SL step downs            Calf press            Sidesteps w/ band @ forefoot            Modalities                                                                 "

## 2024-04-08 ENCOUNTER — OFFICE VISIT (OUTPATIENT)
Dept: PHYSICAL THERAPY | Age: 64
End: 2024-04-08
Payer: COMMERCIAL

## 2024-04-08 DIAGNOSIS — R29.898 ANKLE WEAKNESS: Primary | ICD-10-CM

## 2024-04-08 PROCEDURE — 97110 THERAPEUTIC EXERCISES: CPT

## 2024-04-08 PROCEDURE — 97112 NEUROMUSCULAR REEDUCATION: CPT

## 2024-04-08 NOTE — PROGRESS NOTES
"Daily Note     Today's date: 2024  Patient name: Abbi Claudio  : 1960  MRN: [id number]  Referring provider: Lachman, James R, MD  Dx:   Encounter Diagnosis     ICD-10-CM    1. Ankle weakness  R29.898           Start Time: 0830  Stop Time: 0915  Total time in clinic (min): 45 minutes    Subjective: Pt has soreness in bilateral ankles and is attributing it to the weather.       Objective: See treatment diary below      Assessment: Started walking on the TM on an incline today instead of the nustep. Added forward step downs to address the patients difficulty descending stairs. Continued with current POC. Progressed mini squats by having the patient raises on toes at the bottom of the squat. Regressed PF with unilateral lowers to bilateral lowers and regressed SLS on foam to on firm ground. Tolerated treatment well. Patient demonstrated fatigue post treatment      Plan: Continue per plan of care.      POC expires Unit limit Auth Expiration date PT/OT + Visit Limit?   2024 BOMN  BOMN                           Visit/Unit Tracking  AUTH Status:  Date 3/21 3/28  4/2 4/4 4/8          No auth needed Used 1 2 3 4 5           Remaining                 FOTO  59(73)                 Precautions:       Manuals 3/21 3/28  4/2 4/4 4/8       IASTM Laser:    200cm2  25W  15J/cm2      2' R   nv                     Neuro Re-Ed             Biodex:   Motor control      Maze control  Static x1, L3 x2    L3 3x  L3 3x ea 3x ea static no hands nv        Modified Rom             SL stance   10\"x3  Foam 10\"x3  4x15\" EO/firm        Side stepping   2 laps 30ft 3 laps 30 ft 3 laps 20ft  Standing abd       Tandem ambulation  2 laps 30ft HHA  5 laps at bar 5 laps at bar  4 laps not backward       Tandem stance with ball pass   3'  Peg board blue 2x                      TherEx             Nustep: strength and ROM  L5 5'  L5 5'  L5 5' TM 5'  3%       Calf stretch on slantboard  L3 30\"x4  L3 30\"x4 4x30\"         Mini squats   20x  " "20x  2x10  2x10 w/ toe raise       Calf raises with with ball seated   20x  NT 2x15  2x15       SL abduction  B 20x ea B 20x ea  Standing ytb ankles 2x8  Standing ytb ankles 2x8        Self talocural stretch    10x 10\"  10x10\"        Weighted DF     2x10 ea X15 ea       TM on incline      5' 3%       Forward step downs      X10 4\"  2x10 4\"                               Phase 3            Calf raises with unilateral lower   15x ea 15x ea  2x10 ea b/l        Step ups with PF            SL deadlifts             SL hops            Bear holds            Lunges             SL step downs            Calf press            Sidesteps w/ band @ forefoot            Modalities                                                                   "

## 2024-04-11 ENCOUNTER — OFFICE VISIT (OUTPATIENT)
Dept: PHYSICAL THERAPY | Age: 64
End: 2024-04-11
Payer: COMMERCIAL

## 2024-04-11 DIAGNOSIS — R29.898 ANKLE WEAKNESS: Primary | ICD-10-CM

## 2024-04-11 PROCEDURE — 97110 THERAPEUTIC EXERCISES: CPT

## 2024-04-11 PROCEDURE — 97112 NEUROMUSCULAR REEDUCATION: CPT

## 2024-04-11 NOTE — PROGRESS NOTES
"Daily Note     Today's date: 2024  Patient name: Abbi Claudio  : 1960  MRN: [id number]  Referring provider: Lachman, James R, MD  Dx:   Encounter Diagnosis     ICD-10-CM    1. Ankle weakness  R29.898           Start Time: 0700  Stop Time: 0745  Total time in clinic (min): 45 minutes    Subjective: Pt said both her ankles are feeling good today.      Objective: See treatment diary below      Assessment: Continued current POC only by increasing resistance or repetitions as outlined below. Tolerated treatment well. Patient demonstrated fatigue post treatment      Plan: Continue per plan of care.      POC expires Unit limit Auth Expiration date PT/OT + Visit Limit?   2024 BOMN  BOMN                           Visit/Unit Tracking  AUTH Status:  Date 3/21 3/28  4/2 4/4 4/8 4/10         No auth needed Used 1 2 3 4 5 6          Remaining                 FOTO  59(73)                 Precautions:       Manuals 3/21 3/28  4/2 4/4 4/8 4/10      IASTM Laser:    200cm2  25W  15J/cm2      2' R   nv 2' JM                    Neuro Re-Ed             Biodex:   Motor control      Maze control  Static x1, L3 x2    L3 3x  L3 3x ea 3x ea static no hands nv 3x ea static no hands       Modified Rom             SL stance   10\"x3  Foam 10\"x3  4x15\" EO/firm        Side stepping   2 laps 30ft 3 laps 30 ft 3 laps 20ft  Standing abd       Tandem ambulation  2 laps 30ft HHA  5 laps at bar 5 laps at bar  4 laps not backward       Tandem stance with ball pass   3'  Peg board blue 2x                      TherEx             Nustep: strength and ROM  L5 5'  L5 5'  L5 5' TM 5'  3% TM 5' 3.5%       Calf stretch on slantboard  L3 30\"x4  L3 30\"x4 4x30\"   4x30\"       Mini squats   20x  20x  2x10  2x10 w/ toe raise 2x10 w/ toe raise  2x10 norm w/o bar      Calf raises with with ball seated   20x  NT 2x15  2x15       SL abduction  B 20x ea B 20x ea  Standing ytb ankles 2x8  Standing ytb ankles 2x8  Standing ytb ankles 2x10      Self " "talocural stretch    10x 10\"  10x10\"        Weighted DF     2x10 ea X15 ea X15 each       TM on incline      5' 3% 5' 3%      Forward step downs      X10 4\"  3x10 4\"                               Phase 3            Calf raises with unilateral lower   15x ea 15x ea  2x10 ea b/l  2x10 ea b/l       Step ups with PF            SL deadlifts             SL hops            Bear holds            Lunges             SL step downs            Calf press            Sidesteps w/ band @ forefoot            Modalities                                                                     "

## 2024-04-16 ENCOUNTER — OFFICE VISIT (OUTPATIENT)
Dept: PHYSICAL THERAPY | Age: 64
End: 2024-04-16
Payer: COMMERCIAL

## 2024-04-16 DIAGNOSIS — S93.491A SPRAIN OF ANTERIOR TALOFIBULAR LIGAMENT OF RIGHT ANKLE, INITIAL ENCOUNTER: ICD-10-CM

## 2024-04-16 DIAGNOSIS — R29.898 ANKLE WEAKNESS: Primary | ICD-10-CM

## 2024-04-16 PROCEDURE — 97110 THERAPEUTIC EXERCISES: CPT

## 2024-04-16 PROCEDURE — 97112 NEUROMUSCULAR REEDUCATION: CPT

## 2024-04-16 NOTE — PROGRESS NOTES
"Daily Note     Today's date: 2024  Patient name: Abbi Claudio  : 1960  MRN: [id number]  Referring provider: Lachman, James R, MD  Dx:   Encounter Diagnosis     ICD-10-CM    1. Ankle weakness  R29.898       2. Sprain of anterior talofibular ligament of right ankle, initial encounter  S93.491A           Start Time: 706  Stop Time: 746  Total time in clinic (min): 40 minutes    Subjective: Reports overall improvement with decreased pain. States she was a little stiff this morning when she woke up, but thinks it could have been from going for longer walks  and Monday. States she walked about 2-2.5 miles.       Objective: See treatment diary below      Assessment: Cues for carryover of program and to ensure proper performance and dosage. Difficulty controlling weight shift on Biodex balance machine, but is able to complete without UE support, cues to maintain proper foot position. A little sore after session but no reported pain, decreased stiffness. Patient would benefit from continued PT      Plan: Continue per plan of care.      POC expires Unit limit Auth Expiration date PT/OT + Visit Limit?   2024 BOMN  BOMN                           Visit/Unit Tracking  AUTH Status:  Date 3/21 3/28  4/2 4/4 4/8 4/10 4/16        No auth needed Used 1 2 3 4 5 6 7         Remaining                 FOTO  59(73)                 Precautions:       Manuals 3/21 3/28  4/2 4/4 4/8 4/10 4/16      IASTM Laser:    200cm2  25W  15J/cm2      2' R   nv 2' JM 2' JR                    Neuro Re-Ed             Biodex:   Motor control      Maze control  Static x1, L3 x2    L3 3x  L3 3x ea 3x ea static no hands nv 3x ea static no hands 3x ea static no hands      Modified Rom             SL stance   10\"x3  Foam 10\"x3  4x15\" EO/firm        Side stepping   2 laps 30ft 3 laps 30 ft 3 laps 20ft  Standing abd       Tandem ambulation  2 laps 30ft HHA  5 laps at bar 5 laps at bar  4 laps not backward  2 laps 20 ft FW     Tandem " "stance with ball pass   3'  Peg board blue 2x                      TherEx             Nustep: strength and ROM  L5 5'  L5 5'  L5 5' TM 5'  3% TM 5' 3.5%  NP     Calf stretch on slantboard  L3 30\"x4  L3 30\"x4 4x30\"   4x30\"  4x30\"     Mini squats   20x  20x  2x10  2x10 w/ toe raise 2x10 w/ toe raise  2x10 norm w/o bar 2x10 w/ toe raise  2x10 norm w/o bar     Calf raises with with ball seated   20x  NT 2x15  2x15  2x15     SL abduction  B 20x ea B 20x ea  Standing ytb ankles 2x8  Standing ytb ankles 2x8  Standing ytb ankles 2x10 Standing ytb ankles 2x10     Self talocural stretch    10x 10\"  10x10\"        Weighted DF     2x10 ea X15 ea X15 each  3.5# 15x ea      TM on incline      5' 3% 5' 3% 5' 3.5%      Forward step downs      X10 4\"  3x10 4\"  6\" R  4\" L   3x10 ea                              Phase 3            Calf raises with unilateral lower   15x ea 15x ea  2x10 ea b/l  2x10 ea b/l  On step 2x10 B     Step ups with PF            SL deadlifts             SL hops            Bear holds            Lunges             SL step downs            Calf press            Sidesteps w/ band @ forefoot            Modalities                                                                       "

## 2024-04-18 ENCOUNTER — OFFICE VISIT (OUTPATIENT)
Dept: PHYSICAL THERAPY | Age: 64
End: 2024-04-18
Payer: COMMERCIAL

## 2024-04-18 DIAGNOSIS — R29.898 ANKLE WEAKNESS: Primary | ICD-10-CM

## 2024-04-18 DIAGNOSIS — S93.491A SPRAIN OF ANTERIOR TALOFIBULAR LIGAMENT OF RIGHT ANKLE, INITIAL ENCOUNTER: ICD-10-CM

## 2024-04-18 PROCEDURE — 97110 THERAPEUTIC EXERCISES: CPT

## 2024-04-18 PROCEDURE — 97112 NEUROMUSCULAR REEDUCATION: CPT

## 2024-04-18 NOTE — PROGRESS NOTES
Daily Note     Today's date: 2024  Patient name: Abbi Claudio  : 1960  MRN: [id number]  Referring provider: Lachman, James R, MD  Dx:   Encounter Diagnosis     ICD-10-CM    1. Ankle weakness  R29.898       2. Sprain of anterior talofibular ligament of right ankle, initial encounter  S93.491A           Start Time: 0700  Stop Time: 0745  Total time in clinic (min): 45 minutes    Subjective: Pt notes that both feet feel fine. Sometimes she wakes up and feels very stiff in her R ankle on the lateral side. She has been trying to walk more especially because the weather is nice.      Objective: See treatment diary below      Assessment: Started with the laser. Added RDLs with 4# b/l, the patient required verbal cues for form but once she got the motion she appropriately felt the effects of the exercise in her butt, hamstrings, and calves. Added step ups with exaggerated plantarflexion. Progressed SL calf raises with ball extending big toes with a toe underneath. Progressed standing abduction with band to side stepping in clinic with band. Progressed tandem walking by adding a suitcase carry with a 7# dumbbell. Re-visited unilateral eccentric lowers. Completed all interventions without shoes on (except the step ups and unilateral lowers). Educated the patient on DOMS and discussed trajectory of care; she has re-evaluation coming up and a follow up apt in may with her dr.  Tolerated treatment well. Patient demonstrated fatigue post treatment      Plan: Continue per plan of care.      POC expires Unit limit Auth Expiration date PT/OT + Visit Limit?   2024 BOMN  BOMN                           Visit/Unit Tracking  AUTH Status:  Date 3/21 3/28  4/2 4/4 4/8 4/10 4/16 4/18       No auth needed Used 1 2 3 4 5 6 7 8        Remaining                 FOTO  59(73)                 Precautions:       Manuals 3/28  4/2 4/4 4/8 4/10 4/16  4/18        IASTM Laser:    200cm2  25W  15J/cm2     2' R   nv 2' JM 2' JR   "2' JM                         Neuro Re-Ed                Biodex:   Motor control      Maze control Static x1, L3 x2    L3 3x  L3 3x ea 3x ea static no hands nv 3x ea static no hands 3x ea static no hands          Modified Rom                SL stance  10\"x3  Foam 10\"x3  4x15\" EO/firm            Side stepping  2 laps 30ft 3 laps 30 ft 3 laps 20ft  Standing abd           Tandem ambulation 2 laps 30ft HHA  5 laps at bar 5 laps at bar  4 laps not backward  2 laps 20 ft FW 2 laps  7lb one side        Tandem stance with ball pass  3'  Peg board blue 2x      Peg foam 2x  Tandem foam 2x                        TherEx                Nustep: strength and ROM L5 5'  L5 5'  L5 5' TM 5'  3% TM 5' 3.5%  NP         Calf stretch on slantboard L3 30\"x4  L3 30\"x4 4x30\"   4x30\"  4x30\"         Mini squats  20x  20x  2x10  2x10 w/ toe raise 2x10 w/ toe raise  2x10 norm w/o bar 2x10 w/ toe raise  2x10 norm w/o bar 2x15 toe raise        Calf raises with with ball seated  20x  NT 2x15  2x15  2x15 2x10 with big toe extended         SL abduction B 20x ea B 20x ea  Standing ytb ankles 2x8  Standing ytb ankles 2x8  Standing ytb ankles 2x10 Standing ytb ankles 2x10 Side stepping 20ft x 2light tb        Self talocural stretch   10x 10\"  10x10\"            Weighted DF    2x10 ea X15 ea X15 each  3.5# 15x ea  nv        TM on incline     5' 3% 5' 3% 5' 3.5%  nv        Forward step downs     X10 4\"  3x10 4\"  6\" R  4\" L   3x10 ea  4\" 2x10 ea                                       Phase 3               Calf raises with unilateral lower  15x ea 15x ea  2x10 ea b/l  2x10 ea b/l  On step 2x10 B 15x ea unilateral         Step ups with PF       X15 ea 6\"         RDLs        2x5 4# ea        SL hops               Bear holds               Lunges                Calf press                              Modalities                                                                                "

## 2024-04-22 ENCOUNTER — HOSPITAL ENCOUNTER (OUTPATIENT)
Dept: ULTRASOUND IMAGING | Facility: HOSPITAL | Age: 64
Discharge: HOME | End: 2024-04-22
Payer: COMMERCIAL

## 2024-04-22 DIAGNOSIS — E04.1 NONTOXIC SINGLE THYROID NODULE: ICD-10-CM

## 2024-04-22 PROCEDURE — 76536 US EXAM OF HEAD AND NECK: CPT

## 2024-04-23 ENCOUNTER — OFFICE VISIT (OUTPATIENT)
Dept: PHYSICAL THERAPY | Age: 64
End: 2024-04-23
Payer: COMMERCIAL

## 2024-04-23 DIAGNOSIS — R29.898 ANKLE WEAKNESS: Primary | ICD-10-CM

## 2024-04-23 DIAGNOSIS — S93.491A SPRAIN OF ANTERIOR TALOFIBULAR LIGAMENT OF RIGHT ANKLE, INITIAL ENCOUNTER: ICD-10-CM

## 2024-04-23 PROCEDURE — 97112 NEUROMUSCULAR REEDUCATION: CPT

## 2024-04-23 PROCEDURE — 97110 THERAPEUTIC EXERCISES: CPT

## 2024-04-23 NOTE — PROGRESS NOTES
"Daily Note     Today's date: 2024  Patient name: Abbi Claudio  : 1960  MRN: [id number]  Referring provider: Lachman, James R, MD  Dx:   Encounter Diagnosis     ICD-10-CM    1. Ankle weakness  R29.898       2. Sprain of anterior talofibular ligament of right ankle, initial encounter  S93.491A           Start Time: 0700  Stop Time: 0745  Total time in clinic (min): 45 minutes    Subjective: Reports doing yard work for about an hour and 45 min yesterday, felt fine during but after about a half hour she had significant pain. States she had to take ibprofen. Reports she does not have pain today.       Objective: See treatment diary below  Patient was seen 1:1 for 30 min.     Assessment:  Some cues for carryover of her program and proper performance. No c/o pain t/o session. Did not progress today secondary to having increased pain yesterday, will resume activities not completed and progress as appropriate next session.  Patient would benefit from continued PT.      Plan: Continue per plan of care.      POC expires Unit limit Auth Expiration date PT/OT + Visit Limit?   2024 BOMN  BOMN                           Visit/Unit Tracking  AUTH Status:  Date 3/21 3/28  4/ 4/4 4/8 4/10 4/16 4/18 4/23      No auth needed Used 1 2 3 4 5 6 7 8 9       Remaining                 FOTO  59(73)                 Precautions:       Manuals 3/28  4/2 4/4 4/8 4/10 4/16  4/18 4/23       IASTM Laser:    200cm2  25W  15J/cm2     2' R   nv 2' JM 2' JR  2' JM 2' JR                         Neuro Re-Ed                Biodex:   Motor control      Maze control Static x1, L3 x2    L3 3x  L3 3x ea 3x ea static no hands nv 3x ea static no hands 3x ea static no hands          Modified Rom                SL stance  10\"x3  Foam 10\"x3  4x15\" EO/firm            Side stepping  2 laps 30ft 3 laps 30 ft 3 laps 20ft  Standing abd           Tandem ambulation 2 laps 30ft HHA  5 laps at bar 5 laps at bar  4 laps not backward  2 laps 20 ft FW 2 " "laps  7lb one side 2 laps  7lb one side       Tandem stance with ball pass  3'  Peg board blue 2x      Peg foam 2x  Tandem foam 2x  Peg    Tandem foam 3x ea (4 of each color)                      TherEx                Nustep: strength and ROM L5 5'  L5 5'  L5 5' TM 5'  3% TM 5' 3.5%  NP         Calf stretch on slantboard L3 30\"x4  L3 30\"x4 4x30\"   4x30\"  4x30\"  30\"x4 L3        Mini squats  20x  20x  2x10  2x10 w/ toe raise 2x10 w/ toe raise  2x10 norm w/o bar 2x10 w/ toe raise  2x10 norm w/o bar 2x15 toe raise 2x15 toe raise       Calf raises with with ball seated  20x  NT 2x15  2x15  2x15 2x10 with big toe extended  Standing ball squeeze 30x        SL abduction B 20x ea B 20x ea  Standing ytb ankles 2x8  Standing ytb ankles 2x8  Standing ytb ankles 2x10 Standing ytb ankles 2x10 Side stepping 20ft x 2light tb Side stepping 15ft light tband 3 laps        Self talocural stretch   10x 10\"  10x10\"            Weighted DF    2x10 ea X15 ea X15 each  3.5# 15x ea  nv 3.5# 30x ea       TM on incline     5' 3% 5' 3% 5' 3.5%  nv 5' 3.5%        Forward step downs     X10 4\"  3x10 4\"  6\" R  4\" L   3x10 ea  4\" 2x10 ea  6\" 2x10 ea                                      Phase 3               Calf raises with unilateral lower  15x ea 15x ea  2x10 ea b/l  2x10 ea b/l  On step 2x10 B 15x ea unilateral  On step 30x ea       Step ups with PF       X15 ea 6\"  NT       RDLs        2x5 4# ea 2x10 4#       SL hops               Bear holds               Lunges                Calf press                              Modalities                                                                                  "

## 2024-04-25 ENCOUNTER — EVALUATION (OUTPATIENT)
Dept: PHYSICAL THERAPY | Age: 64
End: 2024-04-25
Payer: COMMERCIAL

## 2024-04-25 DIAGNOSIS — R29.898 ANKLE WEAKNESS: Primary | ICD-10-CM

## 2024-04-25 DIAGNOSIS — S93.491A SPRAIN OF ANTERIOR TALOFIBULAR LIGAMENT OF RIGHT ANKLE, INITIAL ENCOUNTER: ICD-10-CM

## 2024-04-25 PROBLEM — J20.8 VIRAL BRONCHITIS: Status: RESOLVED | Noted: 2024-02-25 | Resolved: 2024-04-25

## 2024-04-25 PROCEDURE — 97110 THERAPEUTIC EXERCISES: CPT

## 2024-04-25 PROCEDURE — 97164 PT RE-EVAL EST PLAN CARE: CPT

## 2024-04-25 NOTE — PROGRESS NOTES
PT Re-Evaluation     Today's date: 2024  Patient name: Abbi Claudio  : 1960  MRN: [id number]  Referring provider: Lachman, James R, MD  Dx:   Encounter Diagnosis     ICD-10-CM    1. Ankle weakness  R29.898       2. Sprain of anterior talofibular ligament of right ankle, initial encounter  S93.491A           Start Time: 0700  Stop Time: 0745  Total time in clinic (min): 45 minutes    Assessment  Assessment details: Pt presents to therapy with c/o of inconsistent ankle pain. Completed functional testing, myotome strength testing, specific MMTs and special tests for the R ankle. First observed patients foot arches, the patient has decreased arch height ad she wears no orthotics for support. While complete gastroc MMT the patient was only able to complete 10 heel raises when pain started compared to the 20 on the L. With palpation the patient had pain at the medial calcaneous, distal achilles, and lateral malleolus. Special testing revealed negative windlass test is sitting and standing, despite the pts subjective note of occasional arch pain and cramps, negative for pain with inversion and PF. The patient had a positive anterior drawer and discomfort while assessing R calf length. Based on these findings in consultation with her imagining, the patient has inflammation at the distal achilles, weakness of the R gastroc. These findings are creating activity limitations and participation restrictions for the patient. Activity limitations such as descending stairs. Participation restrictions such as playing with her grandchildren. The patient will benefit from skilled physical therapy to strength the ankle and hips, improve balance, and functional mobility.     24  Prior to re-evaluation today pt called and explained the ELLEN. When she called, and today, she is most worried about an ankle fx. Observation of the ankle shows swelling concentrated around the lateral malleoli. Figure 8 of the R compared  to the L is increased; R: 60 cm vs L 57 cm. Ankle ligament special tests were able to recreate the discomfort with inversion+plantarflexion, anterior drawer, and when kary the peroneals. Dorsiflexion with eversion was not bothersome. If the Lyman Ankle Rules are applied the patient has pain on the lateral aspect of the ankle but is able to weight bear through the ankle for 5+ steps (however; it is more difficult without a shoe). I was unable to test for a fracture at the ankle because the clinic does not have a toning fork and the vibration from my iPhone was not strong enough, alternatively the vibration from the massage gun was too strong. Palpated along the R calf and completed Alanna's sign the patient was not tender and the test was negative. Continued with the laser for edema reduction and complete low level exercise. Exercise included:     - calf raises with upper extremity support  - ankle isometrics  - peroneal isotonics AG  - sidelying abduction b/l  - HS stretch with peroneal nerve flossing  - calf stretch    Ended session with elevation and ice due to the acute nature of the injury, during next PT session will encourage the discontinuation of PRICE and encourage/educate on PEACE and LOVE. Discussed with the patient that if walking and equal weight bearing continued to be painful she should reach out to urgent care. Margarita sees ROSHAN Funez next, Megan has been educated on the findings and POC changes from today's PT visit.     (P= protect, E= elevate, A= avoid anti-inflammatory modalities, C= compress, E= educate  L= load, O- optimism, V= vascularization, E= exercise)   Impairments: activity intolerance, impaired balance, impaired physical strength, lacks appropriate home exercise program, pain with function and weight-bearing intolerance    Symptom irritability: moderateUnderstanding of Dx/Px/POC: good   Prognosis: good    Goals  In 6 visits:  Administer FOTO  Pt will pass all conditions of mCTSIB  without hesitation to work toward long term goals. - met  Pt will be able to descend stairs using a reciprocal gait pattern - progressing         In 12 visits:  Pt will demonstrate 100% competency of HEP to be appropriate for D/C from therapy after goals have been met, pt is functioning at PLOF, and/or the pt displays significant improvement.    Pt will be able to complete 20 SL heel raises on the R to demonstrate muscular gains of the R gastroc - not met  Pt will be able to balance on R leg for 15 seconds - not met    Plan  Plan details: Pt was educated on the nature of their dx and the benefits of completing physical therapy. Additionally, pt is encouraged to ask questions regarding their dx and POC.     Pt was in agreement that they will be treated by myself in combination with a PTA. Further, informed that we work as a team, the PTAs follow the POC created by the PT, and I trust them to make safe and reasonable changes when appropriate under their scope of practice.     Patient would benefit from: skilled physical therapy  Referral necessary: No  Planned therapy interventions: activity modification, ADL retraining, ADL training, abdominal trunk stabilization, IASTM, joint mobilization, kinesiology taping, manual therapy, massage, muscle pump exercises, neuromuscular re-education, fine motor coordination training, flexibility, functional ROM exercises, gait training, graded activity, graded exercise, graded motor, home exercise program, IADL retraining, transfer training, therapeutic training, therapeutic exercise, therapeutic activities, stretching and patient education  Frequency: 2x week  Duration in visits: 12  Duration in weeks: 6  Plan of Care beginning date: 3/21/2024  Plan of Care expiration date: 6/19/2024  Treatment plan discussed with: patient      Subjective Evaluation    History of Present Illness  Mechanism of injury: A few months ago this pain in her R ankle started with no ELLEN. She does not use  orthotics, she uses a compression sock sleeve that helps stabilize her muscle and provide her with walking confidence. The pain in her ankle is inconsistently provoked and when exacerbated has a hard time accepting weight on R to ambulate down stairs, therefore; causing her to walk step to pattern. Pt identifies the pain to be at the medial calcaneus, lower achilles, and lateral malleoli. Pt had imaging that was reviewed with her by referring physician and she demonstrates knowledge of the results and the anatomy of the ankle. He goals include and ankle and hip strengthening to decrease pain and maintain her PLOF.     24  Pt stepped off her drive way and rolled her ankle inwards yesterday. She fell to the ground additionally hurting bruising her L knee. She was able to work yesterday and walk with her shoes on. Currently she is wearing a compression brace that provides her with the feeling on stability and helps with edema. She has not seeked out further medical attention or imaging.           Recurrent probem    Quality of life: good    Patient Goals  Patient goals for therapy: decreased pain, increased strength, independence with ADLs/IADLs, return to sport/leisure activities, increased motion and improved balance  Patient goal: gain strength to get back to walking  Pain  Current pain ratin  At best pain ratin  At worst pain ratin  Location: top of foot, lateral ankle  Quality: sharp, discomfort and dull ache  Relieving factors: heat, ice, change in position, rest and medications  Aggravating factors: walking  Progression: worsening    Social Support  Steps to enter house: yes  0  Stairs in house: yes   12  Lives in: one-story house  Lives with: spouse    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 No tenderness in the fibula, medial calcaneus, medial malleolus, plantar fascia, proximal Achilles and superior tibiofibular ligament.     Neurological Testing     Sensation     Ankle/Foot   Left Ankle/Foot   Intact: light touch    Right Ankle/Foot   Intact: light touch     Tests     Right Ankle/Foot   Positive for anterior drawer.   Negative for calcaneal squeeze, Homans' sign, posterior drawer, Torres and windlass.     Swelling   Left Ankle/Foot   Figure 8: 57 cm    Right Ankle/Foot   Figure 8: 60 cm    Ambulation     Ambulation: Stairs   Ascend stairs: independent  Pattern: reciprocal  Railings: one rail  Descend stairs: independent  Pattern: non-reciprocal  Railings: one rail    Functional Assessment      Squat    Pain, left valgus and right valgus.     Comments  Patient primarily had pain with squatting because of her knee. She required cueing to perform a squat with proper form, once set up b/l arches collapsed and knees fell into valgus.     mCTSIB: patient passed all conditions, moderate sway with condition #4.     General Comments:      Hip Comments   Weak and painless hip flexion, 4/5     Knee Comments  Weak and painless knee flexion 4/5      Ankle/Foot Comments   See specific ankle findings   Strength: standing heel raises L = 20 / R = 10 (w/ onset of pain)              POC expires Unit limit Auth Expiration date PT/OT + Visit Limit?   6/21/2024 BOMN  BOMN                           Visit/Unit Tracking  AUTH Status:  Date 3/21 3/28  4/2 4/4 4/8 4/10 4/16 4/18 4/23      No auth needed Used 1 2 3 4 5 6 7 8 9       Remaining                 FOTO  59(73)                 Precautions:       Manuals 3/28  4/2 4/4 4/8 4/10 4/16  4/18 4/23       IASTM Laser:    200cm2  25W  15J/cm2     2' R   nv 2' JM 2' JR  2' JM 2' JR      "                    Neuro Re-Ed                Biodex:   Motor control      Maze control Static x1, L3 x2    L3 3x  L3 3x ea 3x ea static no hands nv 3x ea static no hands 3x ea static no hands          Modified Rom                SL stance  10\"x3  Foam 10\"x3  4x15\" EO/firm            Side stepping  2 laps 30ft 3 laps 30 ft 3 laps 20ft  Standing abd           Tandem ambulation 2 laps 30ft HHA  5 laps at bar 5 laps at bar  4 laps not backward  2 laps 20 ft FW 2 laps  7lb one side 2 laps  7lb one side       Tandem stance with ball pass  3'  Peg board blue 2x      Peg foam 2x  Tandem foam 2x  Peg    Tandem foam 3x ea (4 of each color)                      TherEx                Nustep: strength and ROM L5 5'  L5 5'  L5 5' TM 5'  3% TM 5' 3.5%  NP         Calf stretch on slantboard L3 30\"x4  L3 30\"x4 4x30\"   4x30\"  4x30\"  30\"x4 L3        Mini squats  20x  20x  2x10  2x10 w/ toe raise 2x10 w/ toe raise  2x10 norm w/o bar 2x10 w/ toe raise  2x10 norm w/o bar 2x15 toe raise 2x15 toe raise       Calf raises with with ball seated  20x  NT 2x15  2x15  2x15 2x10 with big toe extended  Standing ball squeeze 30x        SL abduction B 20x ea B 20x ea  Standing ytb ankles 2x8  Standing ytb ankles 2x8  Standing ytb ankles 2x10 Standing ytb ankles 2x10 Side stepping 20ft x 2light tb Side stepping 15ft light tband 3 laps        Self talocural stretch   10x 10\"  10x10\"            Weighted DF    2x10 ea X15 ea X15 each  3.5# 15x ea  nv 3.5# 30x ea       TM on incline     5' 3% 5' 3% 5' 3.5%  nv 5' 3.5%        Forward step downs     X10 4\"  3x10 4\"  6\" R  4\" L   3x10 ea  4\" 2x10 ea  6\" 2x10 ea                                      Phase 3               Calf raises with unilateral lower  15x ea 15x ea  2x10 ea b/l  2x10 ea b/l  On step 2x10 B 15x ea unilateral  On step 30x ea       Step ups with PF       X15 ea 6\"  NT       RDLs        2x5 4# ea 2x10 4#       SL hops               Bear holds               Lunges                Calf press   "                            Modalities

## 2024-04-30 ENCOUNTER — OFFICE VISIT (OUTPATIENT)
Dept: PHYSICAL THERAPY | Age: 64
End: 2024-04-30
Payer: COMMERCIAL

## 2024-04-30 DIAGNOSIS — R29.898 ANKLE WEAKNESS: Primary | ICD-10-CM

## 2024-04-30 DIAGNOSIS — S93.491A SPRAIN OF ANTERIOR TALOFIBULAR LIGAMENT OF RIGHT ANKLE, INITIAL ENCOUNTER: ICD-10-CM

## 2024-04-30 PROCEDURE — 97112 NEUROMUSCULAR REEDUCATION: CPT

## 2024-04-30 PROCEDURE — 97110 THERAPEUTIC EXERCISES: CPT

## 2024-04-30 NOTE — PROGRESS NOTES
Daily Note     Today's date: 2024  Patient name: Abbi Claudio  : 1960  MRN: [id number]  Referring provider: Lachman, James R, MD  Dx:   Encounter Diagnosis     ICD-10-CM    1. Ankle weakness  R29.898       2. Sprain of anterior talofibular ligament of right ankle, initial encounter  S93.491A           Start Time: 0700  Stop Time: 07  Total time in clinic (min): 41 minutes    Subjective: Reports improvement overall, no pain with ambulation unless she does too much on her feet. States she was walking in the grass Saturday for a couple of hours and noticed it did swell up and hurt, better post ice and rest. Today her pain is about a 1-2/10. Did not go for imaging, thinks it is just a sprain.       Objective: See treatment diary below      Assessment: Patient was able to complete her program as documented below without c/o pain with the exception of FW step downs, reported post completing all reps. Performed all exercises in sneakers. Minimal increase in pain by 1 level post session. Cues for carryover of program and to ensure proper dosage is completed. Extra cues for RDLs to ensure proper hip hinge. Patient would benefit from continued PT.      Plan: Continue per plan of care.      POC expires Unit limit Auth Expiration date PT/OT + Visit Limit?   2024 BOMN  BOMN                           Visit/Unit Tracking  AUTH Status:  Date 3/21 3/28  4/ 4//8 4/10 4/16 4/18 4/23 4/25 4/30     No auth needed Used 1 2 3 4 5 6 7 8 9 10 11     Remaining                 FOTO  59(73)                 Precautions:       Manuals 3/28  4/2 4/4 4/8 /10 16        IASTM Laser:    200cm2  25W  15J/cm2     2' R   nv 2' JM 2' JR  2' JM 2' JR   2'  JR                       Neuro Re-Ed                Biodex:   Motor control      Maze control Static x1, L3 x2    L3 3x  L3 3x ea 3x ea static no hands nv 3x ea static no hands 3x ea static no hands          Modified Rom                SL stance   "10\"x3  Foam 10\"x3  4x15\" EO/firm            Side stepping  2 laps 30ft 3 laps 30 ft 3 laps 20ft  Standing abd           Tandem ambulation 2 laps 30ft HHA  5 laps at bar 5 laps at bar  4 laps not backward  2 laps 20 ft FW 2 laps  7lb one side 2 laps  7lb one side  2 laps  7lb one side     Tandem stance with ball pass  3'  Peg board blue 2x      Peg foam 2x  Tandem foam 2x  Peg    Tandem foam 3x ea (4 of each color)                      TherEx                Nustep: strength and ROM L5 5'  L5 5'  L5 5' TM 5'  3% TM 5' 3.5%  NP         Calf stretch on slantboard L3 30\"x4  L3 30\"x4 4x30\"   4x30\"  4x30\"  30\"x4 L3   L3 30\"x4     Mini squats  20x  20x  2x10  2x10 w/ toe raise 2x10 w/ toe raise  2x10 norm w/o bar 2x10 w/ toe raise  2x10 norm w/o bar 2x15 toe raise 2x15 toe raise  2x15 w/ toe raise (bar assist)     Calf raises with with ball seated  20x  NT 2x15  2x15  2x15 2x10 with big toe extended  Standing ball squeeze 30x   Standing ball squeeze 30x      SL abduction B 20x ea B 20x ea  Standing ytb ankles 2x8  Standing ytb ankles 2x8  Standing ytb ankles 2x10 Standing ytb ankles 2x10 Side stepping 20ft x 2light tb Side stepping 15ft light tband 3 laps   Side stepping 15ft light tband 3 laps      Self talocural stretch   10x 10\"  10x10\"            Weighted DF    2x10 ea X15 ea X15 each  3.5# 15x ea  nv 3.5# 30x ea  3.5# 30x ea     TM on incline     5' 3% 5' 3% 5' 3.5%  nv 5' 3.5%        Forward step downs     X10 4\"  3x10 4\"  6\" R  4\" L   3x10 ea  4\" 2x10 ea  6\" 2x10 ea   6\" 2x10 ea (heel tap to floor)                                   Phase 3               Calf raises with unilateral lower  15x ea 15x ea  2x10 ea b/l  2x10 ea b/l  On step 2x10 B 15x ea unilateral  On step 30x ea  15x on step R only     Step ups with PF       X15 ea 6\"  NT       RDLs        2x5 4# ea 2x10 4#  4# 2x10      SL hops               Bear holds               Lunges                Calf press                              Modalities                "

## 2024-05-02 ENCOUNTER — TELEPHONE (OUTPATIENT)
Age: 64
End: 2024-05-02

## 2024-05-02 ENCOUNTER — OFFICE VISIT (OUTPATIENT)
Dept: PHYSICAL THERAPY | Age: 64
End: 2024-05-02
Payer: COMMERCIAL

## 2024-05-02 ENCOUNTER — APPOINTMENT (OUTPATIENT)
Dept: RADIOLOGY | Facility: CLINIC | Age: 64
End: 2024-05-02
Payer: COMMERCIAL

## 2024-05-02 ENCOUNTER — OFFICE VISIT (OUTPATIENT)
Dept: URGENT CARE | Facility: CLINIC | Age: 64
End: 2024-05-02
Payer: COMMERCIAL

## 2024-05-02 VITALS
TEMPERATURE: 97.6 F | OXYGEN SATURATION: 97 % | RESPIRATION RATE: 18 BRPM | SYSTOLIC BLOOD PRESSURE: 113 MMHG | DIASTOLIC BLOOD PRESSURE: 69 MMHG | HEART RATE: 81 BPM

## 2024-05-02 DIAGNOSIS — S93.491A SPRAIN OF ANTERIOR TALOFIBULAR LIGAMENT OF RIGHT ANKLE, INITIAL ENCOUNTER: ICD-10-CM

## 2024-05-02 DIAGNOSIS — S93.401A SPRAIN OF RIGHT ANKLE, UNSPECIFIED LIGAMENT, INITIAL ENCOUNTER: Primary | ICD-10-CM

## 2024-05-02 DIAGNOSIS — R29.898 ANKLE WEAKNESS: Primary | ICD-10-CM

## 2024-05-02 DIAGNOSIS — M25.571 ACUTE RIGHT ANKLE PAIN: ICD-10-CM

## 2024-05-02 PROCEDURE — 73610 X-RAY EXAM OF ANKLE: CPT

## 2024-05-02 PROCEDURE — 97110 THERAPEUTIC EXERCISES: CPT

## 2024-05-02 PROCEDURE — 99213 OFFICE O/P EST LOW 20 MIN: CPT

## 2024-05-02 PROCEDURE — 97140 MANUAL THERAPY 1/> REGIONS: CPT

## 2024-05-02 NOTE — PATIENT INSTRUCTIONS
Rest injured extremity, compression as able  Heat and/or ice for 20 minutes at a time to site of injury   Ibuprofen and Tylenol for pain as needed     Follow up with PCP in 3-5 days   Proceed to ER if worsening symptoms

## 2024-05-02 NOTE — PROGRESS NOTES
Daily Note     Today's date: 2024  Patient name: Abbi Claudio  : 1960  MRN: [id number]  Referring provider: Lachman, James R, MD  Dx:   Encounter Diagnosis     ICD-10-CM    1. Ankle weakness  R29.898       2. Sprain of anterior talofibular ligament of right ankle, initial encounter  S93.491A           Start Time: 0700  Stop Time: 0745  Total time in clinic (min): 45 minutes    Subjective: Pt presents today with increased ankle pain, she is describing the sensation as burning.       Objective: See treatment diary below      Assessment: Upon further examination the patients ankle is swollen compared to the other side. Completed ankle special tests and they were negative. High ankle sprain special test was negative, plantar flexion with inversion was negative, anterior/posterior drawer negative, and talocural distraction was negative. The most sensitive and bothersome location is the lateral ankle above the malleoli. Still unable to find a toning fork, trailed vibration on the lateral malleoli with vibration on my phone and the massage gun. The massage gun was painful ; however, the lowest setting is very fast so the reliability is low for this testing method. Encouraged the patient to get imaging to rule out a fracture due to the fact that the pain with weightbearing. Tolerated treatment well. Patient demonstrated fatigue post treatment      Plan: Continue per plan of care.  Altered interventions to minimize ones in weightbearing      POC expires Unit limit Auth Expiration date PT/OT + Visit Limit?   2024 BOMN  BOMN                           Visit/Unit Tracking  AUTH Status:  Date 3/21 3/28  4/2 4/4 4/8 4/10 4/16 4/18 4/23 4/25 4/30  5   No auth needed Used 1 2 3 4 5 6 7 8 9 10 11 12    Remaining                 FOTO  59(73)                 Precautions:       Manuals 3/28  4/2 4/4 4/8 4/10 4/16  4/18 4/23 4/25  4/30  5/2    IASTM Laser:    200cm2  25W  15J/cm2     2' R   nv 2' JM 2' JR  2' JM  "2' JR   2'  JR  2'                     Neuro Re-Ed                Biodex:   Motor control      Maze control Static x1, L3 x2    L3 3x  L3 3x ea 3x ea static no hands nv 3x ea static no hands 3x ea static no hands          Modified Rom           3x30\" EC/foam  3x30\" ball toss      SL stance  10\"x3  Foam 10\"x3  4x15\" EO/firm            Side stepping  2 laps 30ft 3 laps 30 ft 3 laps 20ft  Standing abd           Tandem ambulation 2 laps 30ft HHA  5 laps at bar 5 laps at bar  4 laps not backward  2 laps 20 ft FW 2 laps  7lb one side 2 laps  7lb one side  2 laps  7lb one side     Tandem stance with ball pass  3'  Peg board blue 2x      Peg foam 2x  Tandem foam 2x  Peg    Tandem foam 3x ea (4 of each color)   X10   10# KB around the world                     TherEx                Nustep: strength and ROM L5 5'  L5 5'  L5 5' TM 5'  3% TM 5' 3.5%  NP     L5 5'    Calf stretch on slantboard L3 30\"x4  L3 30\"x4 4x30\"   4x30\"  4x30\"  30\"x4 L3   L3 30\"x4 30\" x4     Mini squats  20x  20x  2x10  2x10 w/ toe raise 2x10 w/ toe raise  2x10 norm w/o bar 2x10 w/ toe raise  2x10 norm w/o bar 2x15 toe raise 2x15 toe raise  2x15 w/ toe raise (bar assist)     Calf raises with with ball seated  20x  NT 2x15  2x15  2x15 2x10 with big toe extended  Standing ball squeeze 30x   Standing ball squeeze 30x  x30    SL abduction B 20x ea B 20x ea  Standing ytb ankles 2x8  Standing ytb ankles 2x8  Standing ytb ankles 2x10 Standing ytb ankles 2x10 Side stepping 20ft x 2light tb Side stepping 15ft light tband 3 laps   Side stepping 15ft light tband 3 laps  2x10    Weighted DF    2x10 ea X15 ea X15 each  3.5# 15x ea  nv 3.5# 30x ea  3.5# 30x ea 3.5# 30x ea  SL x30      TM on incline     5' 3% 5' 3% 5' 3.5%  nv 5' 3.5%        Forward step downs     X10 4\"  3x10 4\"  6\" R  4\" L   3x10 ea  4\" 2x10 ea  6\" 2x10 ea   6\" 2x10 ea (heel tap to floor)                                   Phase 3               Calf raises with unilateral lower  15x ea 15x ea  2x10 ea " "b/l  2x10 ea b/l  On step 2x10 B 15x ea unilateral  On step 30x ea  15x on step R only     Step ups with PF       X15 ea 6\"  NT       RDLs        2x5 4# ea 2x10 4#  4# 2x10      SL hops               Bear holds               Lunges                Calf press                              Modalities                                                                  "

## 2024-05-02 NOTE — PROGRESS NOTES
[facility]        NAME: Abbi Claudio is a 63 y.o. female  : 1960    MRN: [id number]  DATE: May 2, 2024  TIME: 3:21 PM    Assessment and Plan   Acute right ankle pain [M25.571]  1. Acute right ankle pain  XR ankle 3+ vw right        XR R ankle- No acute osseous abnormality. Pending final radiology review.    Patient has PT and ortho follow up scheduled.     Patient Instructions     Rest injured extremity, compression as able  Heat and/or ice for 20 minutes at a time to site of injury   Ibuprofen and Tylenol for pain as needed     Follow up with PCP in 3-5 days   Proceed to ER if worsening symptoms       If tests are performed, our office will contact you with results only if changes need to made to the care plan discussed with you at the visit. [facility]    Chief Complaint     Chief Complaint   Patient presents with    Ankle Pain     Patient with right ankle pain. Fell about 10 days ago and heard a cracking sound. Is currently going to PT. Swelling present, worse at night.         History of Present Illness       Patient reports a fall 10 days ago where she rolled her right ankle. She has chronic issues with the ankle instability and previous injuries on that side. She was already in PT for her ankle when the injury happened and has continued to go. She reports that there is still a significant amount of swelling to the ankle that is worse at night which has raised concerns.     Ankle Pain   The incident occurred more than 1 week ago (10 days ago). The incident occurred at home. The injury mechanism was a fall and an inversion injury. The pain is present in the right ankle. The pain is moderate. Pertinent negatives include no inability to bear weight, loss of motion, loss of sensation, muscle weakness, numbness or tingling. Associated symptoms comments: Swelling. She reports no foreign bodies present. Treatments tried: Physical therapy. The  treatment provided mild relief.       Review of Systems   Review of Systems   Constitutional:  Negative for chills and fever.   HENT:  Negative for congestion, postnasal drip, rhinorrhea, sinus pressure, sore throat and trouble swallowing.    Respiratory:  Negative for cough, chest tightness and shortness of breath.    Cardiovascular:  Negative for chest pain and palpitations.   Gastrointestinal:  Negative for abdominal pain, nausea and vomiting.   Genitourinary:  Negative for difficulty urinating.   Musculoskeletal:  Positive for arthralgias (R ankle pain). Negative for myalgias.   Neurological:  Negative for dizziness, tingling, numbness and headaches.         Current Medications       Current Outpatient Medications:     albuterol (Ventolin HFA) 90 mcg/act inhaler, Inhale 2 puffs every 6 (six) hours (Patient not taking: Reported on 5/2/2024), Disp: , Rfl:     Current Allergies     Allergies as of 05/02/2024    (No Known Allergies)            The following portions of the patient's history were reviewed and updated as appropriate: allergies, current medications, past family history, past medical history, past social history, past surgical history and problem list.     Past Medical History:   Diagnosis Date    Fatigue     No pertinent past medical history     SOB (shortness of breath)        Past Surgical History:   Procedure Laterality Date    ANKLE SURGERY      CHEST WALL BIOPSY Left 5/13/2022    Procedure: EXCISION  BIOPSY LEFT CHEST WALL SOFT TISSUE MASS;  Surgeon: Rishi Kessler MD;  Location: [facility];  Service: General    MASS EXCISION Right 5/13/2022    Procedure: EXCISION BIOPSY TISSUE LESION/MASS LOWER EXTREMITY RIGHT POSTERIOR THIGH;  Surgeon: Rishi Kessler MD;  Location: [facility];  Service: General    ROTATOR CUFF REPAIR Right     THYROIDECTOMY, PARTIAL      TUBAL LIGATION      VARICOSE VEIN SURGERY         Family History   Problem Relation Age of Onset    No Known Problems Mother     Diabetes  Father     Lymphoma Sister     No Known Problems Sister     No Known Problems Daughter     Lymphoma Daughter     No Known Problems Daughter     No Known Problems Maternal Grandmother     No Known Problems Maternal Grandfather     No Known Problems Paternal Grandmother     No Known Problems Paternal Grandfather     No Known Problems Maternal Aunt     Breast cancer Neg Hx     BRCA2 Positive Neg Hx     BRCA2 Negative Neg Hx     BRCA1 Positive Neg Hx     BRCA1 Negative Neg Hx     BRCA 1/2 Neg Hx     Ovarian cancer Neg Hx     Endometrial cancer Neg Hx     Colon cancer Neg Hx     Breast cancer additional onset Neg Hx          Medications have been verified.        Objective   /69   Pulse 81   Temp 97.6 °F (36.4 °C)   Resp 18   SpO2 97%        Physical Exam     Physical Exam  Constitutional:       General: She is not in acute distress.  HENT:      Head: Normocephalic.      Nose: Nose normal.   Eyes:      Pupils: Pupils are equal, round, and reactive to light.   Cardiovascular:      Rate and Rhythm: Normal rate and regular rhythm.      Pulses: Normal pulses.      Heart sounds: Normal heart sounds.   Pulmonary:      Effort: Pulmonary effort is normal.      Breath sounds: Normal breath sounds.   Abdominal:      General: Abdomen is flat.   Musculoskeletal:         General: Normal range of motion.      Right ankle: Swelling (over lateral aspect) present. No ecchymosis or lacerations. Tenderness (mild) present over the ATF ligament. No lateral malleolus, medial malleolus or base of 5th metatarsal tenderness. Normal range of motion.      Left ankle: Normal.   Skin:     General: Skin is warm and dry.      Capillary Refill: Capillary refill takes less than 2 seconds.   Neurological:      Mental Status: She is alert and oriented to person, place, and time.

## 2024-05-02 NOTE — TELEPHONE ENCOUNTER
Caller: Patient    Doctor: Lachman    Reason for call: Patient was calling to reschedule her appt. The phone was cutting out and the call dropped.    Call back#: n/a

## 2024-05-08 ENCOUNTER — OFFICE VISIT (OUTPATIENT)
Dept: OBGYN CLINIC | Facility: CLINIC | Age: 64
End: 2024-05-08
Payer: COMMERCIAL

## 2024-05-08 VITALS
SYSTOLIC BLOOD PRESSURE: 107 MMHG | HEART RATE: 58 BPM | HEIGHT: 69 IN | DIASTOLIC BLOOD PRESSURE: 70 MMHG | BODY MASS INDEX: 29.03 KG/M2 | WEIGHT: 196 LBS

## 2024-05-08 DIAGNOSIS — S93.491A SPRAIN OF ANTERIOR TALOFIBULAR LIGAMENT OF RIGHT ANKLE, INITIAL ENCOUNTER: ICD-10-CM

## 2024-05-08 DIAGNOSIS — S86.311D TEAR OF PERONEAL TENDON, RIGHT, SUBSEQUENT ENCOUNTER: Primary | ICD-10-CM

## 2024-05-08 DIAGNOSIS — Z01.89 ENCOUNTER FOR LOWER EXTREMITY COMPARISON IMAGING STUDY: ICD-10-CM

## 2024-05-08 PROCEDURE — 99213 OFFICE O/P EST LOW 20 MIN: CPT | Performed by: ORTHOPAEDIC SURGERY

## 2024-05-08 NOTE — PROGRESS NOTES
James R Lachman, M.D.  Attending, Orthopaedic Surgery  Foot and Ankle  [facility]      ORTHOPAEDIC FOOT AND ANKLE CLINIC VISIT     Assessment:     Encounter Diagnoses   Name Primary?    Sprain of anterior talofibular ligament of right ankle, initial encounter     Encounter for lower extremity comparison imaging study     Tear of peroneal tendon, right, subsequent encounter Yes            Plan:   The patient verbalized understanding of exam findings and treatment plan. We engaged in the shared decision-making process and treatment options were discussed at length with the patient. Surgical and conservative management discussed today along with risks and benefits.  She was seeing some improvements in PT but then reinjured her ankle about 2 weeks ago.  We will obtain and MRI to rule out a peroneal tendon tear.  See back with MRI resultes      History of Present Illness:   Chief Complaint:   Chief Complaint   Patient presents with    Follow-up     Follow up of the right ankle. Fell 2 1/2 week ago.      Abbi Claudio is a 63 y.o. female who is being seen in follow-up for Right ankle sprain. When we last saw she we recommended PT.  Pain had improved but she had another sprain 2 weeks ago. Residual pain is localized at lateral ankle with minimal radiating and described as sharp and severe.      Pain/symptom timing:  Worse during the day when active  Pain/symptom context:  Worse with activites and work  Pain/symptom modifying factors:  Rest makes better, activities make worse  Pain/symptom associated signs/symptoms: none    Prior treatment   NSAIDsYes   Injections No   Bracing/Orthotics No    Physical Therapy Yes     Orthopedic Surgical History:   See below    Past Medical, Surgical and Social History:  Past Medical History:  has a past medical history of Fatigue, No pertinent past medical history, and SOB (shortness of breath).  Problem List: does not have any pertinent problems on  "file.  Past Surgical History:  has a past surgical history that includes Tubal ligation; Varicose vein surgery; Thyroidectomy, partial; Rotator cuff repair (Right); Ankle surgery; Mass excision (Right, 5/13/2022); and Chest wall biopsy (Left, 5/13/2022).  Family History: family history includes Diabetes in her father; Lymphoma in her daughter and sister; No Known Problems in her daughter, daughter, maternal aunt, maternal grandfather, maternal grandmother, mother, paternal grandfather, paternal grandmother, and sister.  Social History:  reports that she has never smoked. She has never used smokeless tobacco. She reports that she does not currently use alcohol. She reports that she does not currently use drugs.  Current Medications: has a current medication list which includes the following prescription(s): albuterol.  Allergies: has No Known Allergies.     Review of Systems:  General- denies fever/chills  HEENT- denies hearing loss or sore throat  Eyes- denies eye pain or visual disturbances, denies red eyes  Respiratory- denies cough or SOB  Cardio- denies chest pain or palpitations  GI- denies abdominal pain  Endocrine- denies urinary frequency  Urinary- denies pain with urination  Musculoskeletal- Negative except noted above  Skin- denies rashes or wounds  Neurological- denies dizziness or headache  Psychiatric- denies anxiety or difficulty concentrating    Physical Exam:   /70   Pulse 58   Ht 5' 9\" (1.753 m)   Wt 88.9 kg (196 lb)   BMI 28.94 kg/m²   General/Constitutional: No apparent distress: well-nourished and well developed.  Eyes: normal ocular motion  Lymphatic: No appreciable lymphadenopathy  Respiratory: Non-labored breathing  Vascular: No edema, swelling or tenderness, except as noted in detailed exam.  Integumentary: No impressive skin lesions present, except as noted in detailed exam.  Neuro: No ataxia or tremors noted  Psych: Normal mood and affect, oriented to person, place and time. " Appropriate affect.  Musculoskeletal: Normal, except as noted in detailed exam and in HPI.    Examination    Right    Gait Normal   Musculoskeletal Tender to palpation at Peroneals    Skin Normal.      Nails Normal    Range of Motion  20 degrees dorsiflexion, 30 degrees plantarflexion  Subtalar motion: normal    Stability Stable    Muscle Strength 5/5 tibialis anterior  5/5 gastrocnemius-soleus  5/5 posterior tibialis  5/5 peroneal/eversion strength  5/5 EHL  5/5 FHL    Neurologic Normal    Sensation  Intact to light touch throughout sural, saphenous, superficial peroneal, deep peroneal and medial/lateral plantar nerve distributions.  Bliss-Alyssia 5.07 filament (10g) testing deferred.    Cardiovascular Brisk capillary refill < 2 seconds,intact DP and PT pulses    Special Tests None      Imaging Studies:     3 views of the Right ankle were obtained, reviewed and interpreted independently which demonstrate no fracture or dislocation. Reviewed by me personally.          James R. Lachman, MD  Foot & Ankle Surgery   Department of Orthopaedic Surgery  [facility]      I personally performed the service.    James R. Lachman, MD

## 2024-05-13 ENCOUNTER — APPOINTMENT (OUTPATIENT)
Dept: PHYSICAL THERAPY | Age: 64
End: 2024-05-13
Payer: COMMERCIAL

## 2024-05-15 ENCOUNTER — HOSPITAL ENCOUNTER (OUTPATIENT)
Dept: MRI IMAGING | Facility: HOSPITAL | Age: 64
Discharge: HOME | End: 2024-05-15
Attending: ORTHOPAEDIC SURGERY
Payer: COMMERCIAL

## 2024-05-15 DIAGNOSIS — S86.311D TEAR OF PERONEAL TENDON, RIGHT, SUBSEQUENT ENCOUNTER: ICD-10-CM

## 2024-05-15 PROCEDURE — 73721 MRI JNT OF LWR EXTRE W/O DYE: CPT

## 2024-05-16 ENCOUNTER — APPOINTMENT (OUTPATIENT)
Dept: LAB | Facility: CLINIC | Age: 64
End: 2024-05-16
Payer: COMMERCIAL

## 2024-05-16 DIAGNOSIS — R94.6 NONSPECIFIC ABNORMAL RESULTS OF THYROID FUNCTION STUDY: ICD-10-CM

## 2024-05-22 ENCOUNTER — OFFICE VISIT (OUTPATIENT)
Dept: OBGYN CLINIC | Facility: CLINIC | Age: 64
End: 2024-05-22
Payer: COMMERCIAL

## 2024-05-22 VITALS
WEIGHT: 199 LBS | HEART RATE: 57 BPM | DIASTOLIC BLOOD PRESSURE: 63 MMHG | HEIGHT: 69 IN | SYSTOLIC BLOOD PRESSURE: 103 MMHG | BODY MASS INDEX: 29.47 KG/M2

## 2024-05-22 DIAGNOSIS — S93.491A SPRAIN OF ANTERIOR TALOFIBULAR LIGAMENT OF RIGHT ANKLE, INITIAL ENCOUNTER: Primary | ICD-10-CM

## 2024-05-22 PROCEDURE — 99214 OFFICE O/P EST MOD 30 MIN: CPT | Performed by: ORTHOPAEDIC SURGERY

## 2024-05-22 NOTE — PROGRESS NOTES
James R Lachman, M.D.  Attending, Orthopaedic Surgery  Foot and Ankle  [facility]      ORTHOPAEDIC FOOT AND ANKLE CLINIC VISIT     Assessment:     Encounter Diagnosis   Name Primary?    Sprain of anterior talofibular ligament of right ankle, initial encounter Yes     Plan:   The patient verbalized understanding of exam findings and treatment plan. We engaged in the shared decision-making process and treatment options were discussed at length with the patient. Surgical and conservative management discussed today along with risks and benefits.  H/o right ankle sprain, had some improvement with PT however re-injured right ankle approximately 4 weeks ago   Presents for review of MRI which reveals mild common peroneal tenosynovitis without tendinosis/tear and medial talar dome OCD  Reports previous right ankle surgery with [facility]- patient will obtain op reports to determine what exactly was done and if the medial talar dome OCD was present with the previous surgery. If the two ankle arthroscopies that she had done were to address a medial talar dome OCD, then the appearance on MRI would be expected.  She has edema in her fibula and her pain is lateral and not over the medial OCD.  Continue PT  OTC pain medication, ice, elevation, compression stocking for pain and swelling control   She had seen improvements in her symptoms while doing PT until her re-injury, we will restart PT and see if we can get her back on track.  Return in about 8 weeks (around 7/17/2024).      History of Present Illness:   Chief Complaint:   Chief Complaint   Patient presents with    Follow-up     Follow up of the right ankle. MRI review.      Abbi Claudio is a 63 y.o. female who is being seen in follow-up for Right ankle sprain. When we last saw she we recommended MRI.  Pain has  improved. Residual pain is localized at lateral ankle with minimal radiating and described as sharp and severe.      Pain/symptom timing:   Worse during the day when active  Pain/symptom context:  Worse with activites and work  Pain/symptom modifying factors:  Rest makes better, activities make worse  Pain/symptom associated signs/symptoms: none    Prior treatment   NSAIDsYes   Injections No   Bracing/Orthotics No    Physical Therapy Yes     Orthopedic Surgical History:   See below    Past Medical, Surgical and Social History:  Past Medical History:  has a past medical history of Fatigue, No pertinent past medical history, and SOB (shortness of breath).  Problem List: does not have any pertinent problems on file.  Past Surgical History:  has a past surgical history that includes Tubal ligation; Varicose vein surgery; Thyroidectomy, partial; Rotator cuff repair (Right); Ankle surgery; Mass excision (Right, 5/13/2022); and Chest wall biopsy (Left, 5/13/2022).  Family History: family history includes Diabetes in her father; Lymphoma in her daughter and sister; No Known Problems in her daughter, daughter, maternal aunt, maternal grandfather, maternal grandmother, mother, paternal grandfather, paternal grandmother, and sister.  Social History:  reports that she has never smoked. She has never used smokeless tobacco. She reports that she does not currently use alcohol. She reports that she does not currently use drugs.  Current Medications: has a current medication list which includes the following prescription(s): albuterol.  Allergies: has No Known Allergies.     Review of Systems:  General- denies fever/chills  HEENT- denies hearing loss or sore throat  Eyes- denies eye pain or visual disturbances, denies red eyes  Respiratory- denies cough or SOB  Cardio- denies chest pain or palpitations  GI- denies abdominal pain  Endocrine- denies urinary frequency  Urinary- denies pain with urination  Musculoskeletal- Negative except noted above  Skin- denies rashes or wounds  Neurological- denies dizziness or headache  Psychiatric- denies anxiety or difficulty  "concentrating    Physical Exam:   /63   Pulse 57   Ht 5' 9\" (1.753 m)   Wt 90.3 kg (199 lb)   BMI 29.39 kg/m²   General/Constitutional: No apparent distress: well-nourished and well developed.  Eyes: normal ocular motion  Lymphatic: No appreciable lymphadenopathy  Respiratory: Non-labored breathing  Vascular: No edema, swelling or tenderness, except as noted in detailed exam.  Integumentary: No impressive skin lesions present, except as noted in detailed exam.  Neuro: No ataxia or tremors noted  Psych: Normal mood and affect, oriented to person, place and time. Appropriate affect.  Musculoskeletal: Normal, except as noted in detailed exam and in HPI.    Examination    Right    Gait Normal   Musculoskeletal No ttp    Skin Normal.      Nails Normal    Range of Motion  20 degrees dorsiflexion, 30 degrees plantarflexion  Subtalar motion: normal    Stability Stable    Muscle Strength 5/5 tibialis anterior  5/5 gastrocnemius-soleus  5/5 posterior tibialis  4/5 peroneal/eversion strength  5/5 EHL  5/5 FHL    Neurologic Normal    Sensation  Intact to light touch throughout sural, saphenous, superficial peroneal, deep peroneal and medial/lateral plantar nerve distributions.  Rialto-Alyssia 5.07 filament (10g) testing  deferred.    Cardiovascular Brisk capillary refill < 2 seconds,intact DP and PT pulses    Special Tests None      Imaging Studies:   MRI available for review of Right ankle which demonstrates mild common peroneal tenosynovitis without tendinosis/tear and medial talar dome OCD and bone marrow edema of distal fibula. Reviewed by me personally.    Scribe Attestation      I,:  Celia Riley PA-C am acting as a scribe while in the presence of the attending physician.:       I,:  James R Lachman, MD personally performed the services described in this documentation    as scribed in my presence.:           James R. Lachman, MD  Foot & Ankle Surgery   Department of Orthopaedic Surgery  [facility]" [facility]      I personally performed the service.    James R. Lachman, MD

## 2024-05-23 ENCOUNTER — EVALUATION (OUTPATIENT)
Dept: PHYSICAL THERAPY | Age: 64
End: 2024-05-23
Payer: COMMERCIAL

## 2024-05-23 DIAGNOSIS — S93.491A SPRAIN OF ANTERIOR TALOFIBULAR LIGAMENT OF RIGHT ANKLE, INITIAL ENCOUNTER: Primary | ICD-10-CM

## 2024-05-23 PROCEDURE — 97530 THERAPEUTIC ACTIVITIES: CPT

## 2024-05-23 PROCEDURE — 97110 THERAPEUTIC EXERCISES: CPT

## 2024-05-23 NOTE — PROGRESS NOTES
PT Re-Evaluation     Today's date: 2024  Patient name: Abbi Claudio  : 1960  MRN: [id number]  Referring provider: Lachman, James R, MD  Dx:   Encounter Diagnosis     ICD-10-CM    1. Sprain of anterior talofibular ligament of right ankle, initial encounter  S93.491A             Start Time: 0700  Stop Time: 0745  Total time in clinic (min): 45 minutes    Assessment  Impairments: activity intolerance, impaired balance, impaired physical strength, lacks appropriate home exercise program, pain with function and weight-bearing intolerance  Symptom irritability: moderate    Assessment details: Pt presents to therapy with c/o of inconsistent ankle pain. Completed functional testing, myotome strength testing, specific MMTs and special tests for the R ankle. First observed patients foot arches, the patient has decreased arch height ad she wears no orthotics for support. While complete gastroc MMT the patient was only able to complete 10 heel raises when pain started compared to the 20 on the L. With palpation the patient had pain at the medial calcaneous, distal achilles, and lateral malleolus. Special testing revealed negative windlass test is sitting and standing, despite the pts subjective note of occasional arch pain and cramps, negative for pain with inversion and PF. The patient had a positive anterior drawer and discomfort while assessing R calf length. Based on these findings in consultation with her imagining, the patient has inflammation at the distal achilles, weakness of the R gastroc. These findings are creating activity limitations and participation restrictions for the patient. Activity limitations such as descending stairs. Participation restrictions such as playing with her grandchildren. The patient will benefit from skilled physical therapy to strength the ankle and hips, improve balance, and functional mobility.     24  Prior to re-evaluation today pt called and explained the ELLEN.  When she called, and today, she is most worried about an ankle fx. Observation of the ankle shows swelling concentrated around the lateral malleoli. Figure 8 of the R compared to the L is increased; R: 60 cm vs L 57 cm. Ankle ligament special tests were able to recreate the discomfort with inversion+plantarflexion, anterior drawer, and when kary the peroneals. Dorsiflexion with eversion was not bothersome. If the [place] Ankle Rules are applied the patient has pain on the lateral aspect of the ankle but is able to weight bear through the ankle for 5+ steps (however; it is more difficult without a shoe). I was unable to test for a fracture at the ankle because the clinic does not have a toning fork and the vibration from my iPhone was not strong enough, alternatively the vibration from the massage gun was too strong. Palpated along the R calf and completed Alanna's sign the patient was not tender and the test was negative. Continued with the laser for edema reduction and complete low level exercise. Exercise included:     - calf raises with upper extremity support  - ankle isometrics  - peroneal isotonics AG  - sidelying abduction b/l  - HS stretch with peroneal nerve flossing  - calf stretch    Ended session with elevation and ice due to the acute nature of the injury, during next PT session will encourage the discontinuation of PRICE and encourage/educate on PEACE and LOVE. Discussed with the patient that if walking and equal weight bearing continued to be painful she should reach out to urgent care. Margarita sees ROSHAN Funez next, Megan has been educated on the findings and POC changes from today's PT visit.     (P= protect, E= elevate, A= avoid anti-inflammatory modalities, C= compress, E= educate  L= load, O- optimism, V= vascularization, E= exercise)     5/23/24   Compared re-evaluation findings to last evaluation findings the patient has not made significant change since her last re-evaluation. We discussed  POC going forward to emphasize strengthening the musculature around her ankle to provide stability. Updated her HEP, exercise diary, and scheduled the patient for the month of June. The pt remains a candidate for physical therapy.     Understanding of Dx/Px/POC: good     Prognosis: good    Goals  In 6 visits:  Administer FOTO  Pt will pass all conditions of mCTSIB without hesitation to work toward long term goals. - met  Pt will be able to descend stairs using a reciprocal gait pattern - progressing         In 12 visits:  Pt will demonstrate 100% competency of HEP to be appropriate for D/C from therapy after goals have been met, pt is functioning at PLOF, and/or the pt displays significant improvement.    Pt will be able to complete 20 SL heel raises on the R to demonstrate muscular gains of the R gastroc - not met  Pt will be able to balance on R leg for 15 seconds - not met    Plan  Patient would benefit from: skilled physical therapy  Referral necessary: No    Planned therapy interventions: activity modification, ADL retraining, ADL training, abdominal trunk stabilization, IASTM, joint mobilization, kinesiology taping, manual therapy, massage, muscle pump exercises, neuromuscular re-education, fine motor coordination training, flexibility, functional ROM exercises, gait training, graded activity, graded exercise, graded motor, home exercise program, IADL retraining, transfer training, therapeutic training, therapeutic exercise, therapeutic activities, stretching and patient education    Frequency: 2x week  Duration in visits: 12  Duration in weeks: 8  Plan of Care beginning date: 3/21/2024  Plan of Care expiration date: 7/19/2024  Treatment plan discussed with: patient  Plan details: Pt was educated on the nature of their dx and the benefits of completing physical therapy. Additionally, pt is encouraged to ask questions regarding their dx and POC.     Pt was in agreement that they will be treated by myself in  combination with a PTA. Further, informed that we work as a team, the PTAs follow the POC created by the PT, and I trust them to make safe and reasonable changes when appropriate under their scope of practice.       Subjective Evaluation    History of Present Illness  Mechanism of injury: A few months ago this pain in her R ankle started with no ELLEN. She does not use orthotics, she uses a compression sock sleeve that helps stabilize her muscle and provide her with walking confidence. The pain in her ankle is inconsistently provoked and when exacerbated has a hard time accepting weight on R to ambulate down stairs, therefore; causing her to walk step to pattern. Pt identifies the pain to be at the medial calcaneus, lower achilles, and lateral malleoli. Pt had imaging that was reviewed with her by referring physician and she demonstrates knowledge of the results and the anatomy of the ankle. He goals include and ankle and hip strengthening to decrease pain and maintain her PLOF.     24  Pt stepped off her drive way and rolled her ankle inwards yesterday. She fell to the ground additionally hurting bruising her L knee. She was able to work yesterday and walk with her shoes on. Currently she is wearing a compression brace that provides her with the feeling on stability and helps with edema. She has not seeked out further medical attention or imaging.     24  Since then you had imaging and revisited the doctor. He suggests continuing PT and the patient agrees. She feels like the swelling has decreased and she needs to complete interventions with her shoes on.           Recurrent probem    Quality of life: good    Patient Goals  Patient goals for therapy: decreased pain, increased strength, independence with ADLs/IADLs, return to sport/leisure activities, increased motion and improved balance  Patient goal: gain strength to get back to walking  Pain  Current pain ratin  At best pain ratin  At worst pain  ratin  Location: top of foot, lateral ankle, achilles tendon  Quality: sharp, discomfort and dull ache  Relieving factors: heat, ice, change in position, rest and medications  Aggravating factors: walking  Progression: worsening    Social Support  Steps to enter house: yes  0  Stairs in house: yes   12  Lives in: one-story house  Lives with: spouse    Employment status: working (home [employer])  Hand dominance: right  Exercise history: walking 2 miles 2-3x/week , job requirement, playing with grandkids      Diagnostic Tests  X-ray: normal  MRI studies: abnormal      Objective     Palpation     Additional Palpation Details  No pain while palpating for special tests     Tenderness     Right Ankle/Foot   Tenderness in the Achilles insertion, anterior talofibular ligament, dorsum foot and lateral malleolus. No tenderness in the fibula, medial calcaneus, medial malleolus, plantar fascia, proximal Achilles and superior tibiofibular ligament.     Neurological Testing     Sensation     Ankle/Foot   Left Ankle/Foot   Intact: light touch    Right Ankle/Foot   Intact: light touch     Tests     Right Ankle/Foot   Positive for anterior drawer.   Negative for calcaneal squeeze, Homans' sign, posterior drawer, Torres and windlass.     Swelling   Left Ankle/Foot   Figure 8: 57 cm    Right Ankle/Foot   Figure 8: 57 cm    Ambulation     Ambulation: Stairs   Ascend stairs: independent  Pattern: reciprocal  Railings: one rail  Descend stairs: independent  Pattern: non-reciprocal  Railings: one rail    Functional Assessment      Squat    Pain, left valgus and right valgus.     Comments  Patient primarily had pain with squatting because of her knee. She required cueing to perform a squat with proper form, once set up b/l arches collapsed and knees fell into valgus.     mCTSIB: patient passed all conditions, moderate sway with condition #4.     General Comments:      Hip Comments   Weak and painless hip flexion, 4/5     Knee  "Comments  Weak and painless knee flexion 4/5      Ankle/Foot Comments   See specific ankle findings   Strength: standing heel raises L = 20 / R = 10 (w/ onset of pain)              POC expires Unit limit Auth Expiration date PT/OT + Visit Limit?   6/21/2024 BOMN  BOMN                           Visit/Unit Tracking  AUTH Status:  Date 3/21 3/28  4/2 4/4 4/8 4/10 4/16 4/18 4/23 4/25 4/30  5/2   No auth needed Used 1 2 3 4 5 6 7 8 9 10 11 12    Remaining                 FOTO  59(73)               AUTH Status:  Date 5/7 5/23             No auth needed  Used 13 14              Remaining                 FOTO                       Precautions:     Manuals 5/23              IASTM Laser:    200cm2  25W  15J/cm2                                  Neuro Re-Ed               EC/foam 2x30\"              Plyoback NBOS on foam                 SL stance                Side stepping  Light tb toes 2x20ft               Tandem ambulation               Tandem stance with ball pass  Foam 2x30\" ea                              TherEx                TM on incline  5'  4%  2 mph              Calf stretch on slantboard 4x30\"               Mini squats                Calf raises with ball X15   X15 on foam              Tband eversion Blue tb x20               Single leg step overs  X30\" ea                                                                           Phase 3               Calf raises with unilateral lower                Step ups with PF               RDLs                SL hops               Bear holds               Lunges                Calf press                              Modalities                                                   OLD  Manuals 3/28  4/2 4/4 4/8 4/10 4/16  4/18 4/23 4/25  4/30  5/2 5/7   IASTM Laser:    200cm2  25W  15J/cm2     2' R   nv 2' JM 2' JR  2' JM 2' JR   2'  JR  2' 2' JR                     Neuro Re-Ed                Biodex:   Motor control      Maze control Static x1, L3 x2    L3 3x  L3 3x ea 3x ea " "static no hands nv 3x ea static no hands 3x ea static no hands          Modified Rom           3x30\" EC/foam  3x30\" ball toss  2x30\" ea EC foam       Plyoback NBOS on foam             RMB 30x *pods next session*    SL stance  10\"x3  Foam 10\"x3  4x15\" EO/firm            Side stepping  2 laps 30ft 3 laps 30 ft 3 laps 20ft  Standing abd           Tandem ambulation 2 laps 30ft HHA  5 laps at bar 5 laps at bar  4 laps not backward  2 laps 20 ft FW 2 laps  7lb one side 2 laps  7lb one side  2 laps  7lb one side 2 laps  One 7# DB, 2 laps ea hand   Tandem stance with ball pass  3'  Peg board blue 2x      Peg foam 2x  Tandem foam 2x  Peg    Tandem foam 3x ea (4 of each color)   X10   10# KB around the world   X10   10# KB around the world                    TherEx                Nustep: strength and ROM L5 5'  L5 5'  L5 5' TM 5'  3% TM 5' 3.5%  NP     L5 5' TM see below   Calf stretch on slantboard L3 30\"x4  L3 30\"x4 4x30\"   4x30\"  4x30\"  30\"x4 L3   L3 30\"x4 30\" x4  L3 30\"x4    Mini squats  20x  20x  2x10  2x10 w/ toe raise 2x10 w/ toe raise  2x10 norm w/o bar 2x10 w/ toe raise  2x10 norm w/o bar 2x15 toe raise 2x15 toe raise  2x15 w/ toe raise (bar assist)     Calf raises with with ball seated  20x  NT 2x15  2x15  2x15 2x10 with big toe extended  Standing ball squeeze 30x   Standing ball squeeze 30x  x30 30x    SL abduction B 20x ea B 20x ea  Standing ytb ankles 2x8  Standing ytb ankles 2x8  Standing ytb ankles 2x10 Standing ytb ankles 2x10 Side stepping 20ft x 2light tb Side stepping 15ft light tband 3 laps   Side stepping 15ft light tband 3 laps  2x10 Medium blue 3 laps    Weighted DF    2x10 ea X15 ea X15 each  3.5# 15x ea  nv 3.5# 30x ea  3.5# 30x ea 3.5# 30x ea  SL x30   3.5# 30x DF   TM on incline     5' 3% 5' 3% 5' 3.5%  nv 5' 3.5%     5' 3.5%   Forward step downs     X10 4\"  3x10 4\"  6\" R  4\" L   3x10 ea  4\" 2x10 ea  6\" 2x10 ea   6\" 2x10 ea (heel tap to floor)                                   Phase 3             " "  Calf raises with unilateral lower  15x ea 15x ea  2x10 ea b/l  2x10 ea b/l  On step 2x10 B 15x ea unilateral  On step 30x ea  15x on step R only  15x    Step ups with PF       X15 ea 6\"  NT       RDLs        2x5 4# ea 2x10 4#  4# 2x10   4# 2x10    SL hops               Bear holds               Lunges                Calf press                              Modalities                                                                  "

## 2024-05-28 ENCOUNTER — OFFICE VISIT (OUTPATIENT)
Dept: PHYSICAL THERAPY | Age: 64
End: 2024-05-28
Payer: COMMERCIAL

## 2024-05-28 DIAGNOSIS — R29.898 ANKLE WEAKNESS: ICD-10-CM

## 2024-05-28 DIAGNOSIS — S93.491A SPRAIN OF ANTERIOR TALOFIBULAR LIGAMENT OF RIGHT ANKLE, INITIAL ENCOUNTER: Primary | ICD-10-CM

## 2024-05-28 PROCEDURE — 97110 THERAPEUTIC EXERCISES: CPT

## 2024-05-28 PROCEDURE — 97112 NEUROMUSCULAR REEDUCATION: CPT

## 2024-05-28 NOTE — PROGRESS NOTES
"Daily Note     Today's date: 2024  Patient name: Abbi Claudio  : 1960  MRN: [id number]  Referring provider: Lachman, James R, MD  Dx:   Encounter Diagnosis     ICD-10-CM    1. Sprain of anterior talofibular ligament of right ankle, initial encounter  S93.491A       2. Ankle weakness  R29.898           Start Time: 915  Stop Time: 954  Total time in clinic (min): 39 minutes    Subjective: Denies pain upon arrival       Objective: See treatment diary below      Assessment: Cues for slow, controlled movements, especially during eversion with resistance. Challenged by tandem stance on foam with ball toss to wall in corner, but able to complete with minimal UE support on wall to stabilize. Mild pain noted during spider walks that resolved at rest. Added R SLS with restance band and LLE 3-way vectors for directional change and stability of R ankle. Minimal challenge with tandem stance on foam with plyoback ball toss, consider using pods or other unstable surface. Patient would benefit from continued PT.      Plan: Continue per plan of care.      POC expires Unit limit Auth Expiration date PT/OT + Visit Limit?   2024 BOMN  BOMN                           Visit/Unit Tracking  AUTH Status:  Date 3/21 3/28  4/2 4/4 4/8 4/10 4/16 4/18 4/23 4/25 4/30  5/2   No auth needed Used 1 2 3 4 5 6 7 8 9 10 11 12    Remaining                   FOTO  59(73)               AUTH Status:  Date              No auth needed  Used 13 14 15             Remaining                   FOTO                       Precautions:     Manuals                IASTM Laser:    200cm2  25W    15J/cm2                                  Neuro Re-Ed               EC/foam 2x30\" Tandem/foam/EC 30\" ea             Plyoback NBOS on foam   Tandem 15x ea Pods              SL stance                Side stepping  Light tb toes 2x20ft  Med blue 2x20 ft             Tandem ambulation  10# KB 2 laps FW (25 ft)             Tandem stance " "with ball pass  Foam 2x30\" ea  Foam, tandem, toss ball to wall in corner   1'              R SLS with L 3-way vectors  10x                             TherEx                TM on incline  5'  4%  2 mph 5' 4% incline 3 mph             Calf stretch on slantboard 4x30\"  4x30\" L3              Mini squats                Calf raises with ball X15   X15 on foam 30x on foam              Tband eversion Blue tb x20  Blue tb x20              Single leg step overs  X30\" ea  15x ea (lift over soft bolster)             Spider walks standing    Blue 10x *pain Light band*                                                         Phase 3               Calf raises with unilateral lower                Step ups with PF               RDLs                SL hops               Bear holds               Lunges                Calf press                              Modalities                                                        "

## 2024-05-29 ENCOUNTER — OFFICE VISIT (OUTPATIENT)
Dept: PHYSICAL THERAPY | Age: 64
End: 2024-05-29
Payer: COMMERCIAL

## 2024-05-29 DIAGNOSIS — R29.898 ANKLE WEAKNESS: ICD-10-CM

## 2024-05-29 DIAGNOSIS — S93.491A SPRAIN OF ANTERIOR TALOFIBULAR LIGAMENT OF RIGHT ANKLE, INITIAL ENCOUNTER: Primary | ICD-10-CM

## 2024-05-29 PROCEDURE — 97110 THERAPEUTIC EXERCISES: CPT

## 2024-05-29 PROCEDURE — 97112 NEUROMUSCULAR REEDUCATION: CPT

## 2024-05-29 NOTE — PROGRESS NOTES
"Daily Note     Today's date: 2024  Patient name: Abbi Claudio  : 1960  MRN: [id number]  Referring provider: Lachman, James R, MD  Dx:   Encounter Diagnosis     ICD-10-CM    1. Sprain of anterior talofibular ligament of right ankle, initial encounter  S93.491A       2. Ankle weakness  R29.898                      Subjective: Notes ankle is feels fine today.       Objective: See treatment diary below      Assessment: Tolerated treatment well, challenged appropriately with re bounder toss tandem stance on pods and progression of sliders R SLS 3 vectors. Overall patient demonstrating appropriate ankle strategy and steppage strategy when necessary. Able to complete spider walks without pain. Overall demonstrating good tolerance to outlined TE. Patient would benefit from continued PT to promote improved strength and stability to enhance overall function.     Consider progression to phase 3 in upcoming visits.     Plan: Continue per plan of care.      POC expires Unit limit Auth Expiration date PT/OT + Visit Limit?   2024 BOMN  BOMN                           Visit/Unit Tracking  AUTH Status:  Date 3/21 3/28  4 4/4 4/8 4/10 4/16 4/18 4/23 4/25 4/30  5/2   No auth needed Used 1 2 3 4 5 6 7 8 9 10 11 12    Remaining                   FOTO  59(73)               AUTH Status:  Date 5/7 5/23 5/28  5/29           No auth needed  Used 13 14 15 16            Remaining                   FOTO                       Precautions:     Manuals               IASTM Laser:    200cm2  25W    15J/cm2                                  Neuro Re-Ed               EC/foam 2x30\" Tandem/foam/EC 30\" ea Tandem 3\"30\" ea            Plyoback NBOS on foam   Tandem 15x ea Pods sm 15x              SL stance                Side stepping  Light tb toes 2x20ft  Med blue 2x20 ft Medium blue 2x20ft            Tandem ambulation  10# KB 2 laps FW (25 ft) 15# 2 laps (25 feet)            Tandem stance with ball pass  Foam 2x30\" " "ea  Foam, tandem, toss ball to wall in corner   1'  Foam tand ball toss at wall 2x sides corner 1' alternating            R SLS with L 3-way vectors  10x  10x disc                           TherEx                TM on incline  5'  4%  2 mph 5' 4% incline 3 mph 5' 4% inc 3 mph            Calf stretch on slantboard 4x30\"  4x30\" L3  4\"x30 L3            Mini squats                Calf raises with ball X15   X15 on foam 30x on foam  30x on foam            Tband eversion Blue tb x20  Blue tb x20  Blue tb x20            Single leg step overs  X30\" ea  15x ea (lift over soft bolster) 15x (soft josh bolster)            Spider walks standing    Blue 10x *pain Yel band 10x                                                          Phase 3               Calf raises with unilateral lower                Step ups with PF               RDLs                SL hops               Bear holds               Lunges                Calf press                              Modalities                                                          "

## 2024-06-05 ENCOUNTER — APPOINTMENT (OUTPATIENT)
Dept: PHYSICAL THERAPY | Age: 64
End: 2024-06-05
Payer: COMMERCIAL

## 2024-06-06 ENCOUNTER — APPOINTMENT (OUTPATIENT)
Dept: PHYSICAL THERAPY | Age: 64
End: 2024-06-06
Payer: COMMERCIAL

## 2024-06-07 ENCOUNTER — APPOINTMENT (OUTPATIENT)
Dept: PHYSICAL THERAPY | Age: 64
End: 2024-06-07
Payer: COMMERCIAL

## 2024-06-10 ENCOUNTER — APPOINTMENT (OUTPATIENT)
Dept: LAB | Facility: CLINIC | Age: 64
End: 2024-06-10
Payer: COMMERCIAL

## 2024-06-10 ENCOUNTER — OFFICE VISIT (OUTPATIENT)
Dept: PHYSICAL THERAPY | Age: 64
End: 2024-06-10
Payer: COMMERCIAL

## 2024-06-10 DIAGNOSIS — S93.491A SPRAIN OF ANTERIOR TALOFIBULAR LIGAMENT OF RIGHT ANKLE, INITIAL ENCOUNTER: Primary | ICD-10-CM

## 2024-06-10 DIAGNOSIS — R29.898 ANKLE WEAKNESS: ICD-10-CM

## 2024-06-10 DIAGNOSIS — R94.6 NONSPECIFIC ABNORMAL RESULTS OF THYROID FUNCTION STUDY: ICD-10-CM

## 2024-06-10 PROCEDURE — 97110 THERAPEUTIC EXERCISES: CPT

## 2024-06-10 PROCEDURE — 36415 COLL VENOUS BLD VENIPUNCTURE: CPT

## 2024-06-10 PROCEDURE — 84443 ASSAY THYROID STIM HORMONE: CPT

## 2024-06-10 PROCEDURE — 84436 ASSAY OF TOTAL THYROXINE: CPT

## 2024-06-10 NOTE — PROGRESS NOTES
"Daily Note     Today's date: 6/10/2024  Patient name: Abbi Claudio  : 1960  MRN: [id number]  Referring provider: Lachman, James R, MD  Dx:   Encounter Diagnosis     ICD-10-CM    1. Sprain of anterior talofibular ligament of right ankle, initial encounter  S93.491A       2. Ankle weakness  R29.898           Start Time: 0945  Stop Time: 1030  Total time in clinic (min): 45 minutes    Subjective: Pt feels good. She was babysitting and running around with her grandkids yesterday.       Objective: See treatment diary below      Assessment: I have not personally seen the patient since her last re-evaluation, through conversations with the PTA's and chart review patient is ready for advancing her interventions. See progressions documented below. Patient was appropriately challenged with the three way hip kick on the foam. Tolerated treatment well. Patient demonstrated fatigue post treatment      Plan: Continue per plan of care.      POC expires Unit limit Auth Expiration date PT/OT + Visit Limit?   2024 BOMN  BOMN                           Visit/Unit Tracking  AUTH Status:  Date 3/21 3/28  4/2 4/4 4/8 4/10 4/16 4/18 4/23 4/25 4/30  5/2   No auth needed Used 1 2 3 4 5 6 7 8 9 10 11 12    Remaining                   FOTO  59(73)               AUTH Status:  Date 5/7 5/23 5/28  5/29 6/10          No auth needed  Used 13 14 15 16 17           Remaining                   FOTO                       Precautions:     Manuals 5/23 5/28 5/29 6/10                                              Neuro Re-Ed               EC/foam 2x30\" Tandem/foam/EC 30\" ea Tandem 3\"30\" ea            Plyoback NBOS on foam   Tandem 15x ea Pods sm 15x              SL stance                Side stepping  Light tb toes 2x20ft  Med blue 2x20 ft Medium blue 2x20ft BTB toes 2x20ft           Tandem ambulation  10# KB 2 laps FW (25 ft) 15# 2 laps (25 feet) 10# KB x2 20ft            Tandem stance with ball pass  Foam 2x30\" ea  Foam, tandem, toss " "ball to wall in corner   1'  Foam tand ball toss at wall 2x sides corner 1' alternating            R SLS with L 3-way vectors  10x  10x disc                           TherEx                TM on incline  5'  4%  2 mph 5' 4% incline 3 mph 5' 4% inc 3 mph 5' 4% 3 mph           Calf stretch on slantboard 4x30\"  4x30\" L3  4\"x30 L3            Calf raises with ball X15   X15 on foam 30x on foam  30x on foam            Tband eversion Blue tb x20  Blue tb x20  Blue tb x20            Single leg step overs  X30\" ea  15x ea (lift over soft bolster) 15x (soft josh bolster)            Spider walks standing    Blue 10x *pain Yel band 10x  Hard blue  10x                                                                        Calf raises with unilateral lower     X20 ea            Step ups with PF    X15 ea 8\"            RDLs     2x10 4#            SL RDLs    Nv            3 way hip on foam     X10            Bear holds    nv           Lunges     2x10 @rail            Calf press                              Modalities                                                            "

## 2024-06-11 LAB
T4 SERPL-MCNC: 9.54 UG/DL (ref 6.09–12.23)
TSH SERPL DL<=0.05 MIU/L-ACNC: 0.59 UIU/ML (ref 0.45–4.5)

## 2024-06-13 ENCOUNTER — OFFICE VISIT (OUTPATIENT)
Dept: PHYSICAL THERAPY | Age: 64
End: 2024-06-13
Payer: COMMERCIAL

## 2024-06-13 DIAGNOSIS — R29.898 ANKLE WEAKNESS: ICD-10-CM

## 2024-06-13 DIAGNOSIS — S93.491A SPRAIN OF ANTERIOR TALOFIBULAR LIGAMENT OF RIGHT ANKLE, INITIAL ENCOUNTER: Primary | ICD-10-CM

## 2024-06-13 PROCEDURE — 97112 NEUROMUSCULAR REEDUCATION: CPT

## 2024-06-13 PROCEDURE — 97110 THERAPEUTIC EXERCISES: CPT

## 2024-06-13 NOTE — PROGRESS NOTES
"Daily Note     Today's date: 2024  Patient name: Abbi Claudio  : 1960  MRN: [id number]  Referring provider: Lachman, James R, MD  Dx:   Encounter Diagnosis     ICD-10-CM    1. Sprain of anterior talofibular ligament of right ankle, initial encounter  S93.491A       2. Ankle weakness  R29.898           Start Time: 0700  Stop Time: 0745  Total time in clinic (min): 45 minutes    Subjective: No new complaints.       Objective: See treatment diary below      Assessment: Added walking on half balls/riverrocks and single leg pallof presses. These challenged the patient's balance. Continue to progress to challenge patient's balance and R ankle stability. Patient demonstrated fatigue post treatment      Plan: Continue per plan of care.      POC expires Unit limit Auth Expiration date PT/OT + Visit Limit?   2024 BOMN  BOMN                           Visit/Unit Tracking  AUTH Status:  Date 3/21 3/28  4/2 4/4 4/8 4/10 4/16 4/18 4/23 4/25 4/30  5/2   No auth needed Used 1 2 3 4 5 6 7 8 9 10 11 12    Remaining                   FOTO  59(73)               AUTH Status:  Date 5/7 5/23 5/28  5/29 6/10 6/13         No auth needed  Used 13 14 15 16 17 18          Remaining                   FOTO                       Precautions:   Manuals 5/23 5/28 5/29 6/10 6/13                                             Neuro Re-Ed               EC/foam 2x30\" Tandem/foam/EC 30\" ea Tandem 3\"30\" ea  D/C           [place] NBOS on foam   Tandem 15x ea Pods sm 15x   D/C            SL stance      + pallof press x10 gtb           Side stepping  Light tb toes 2x20ft  Med blue 2x20 ft Medium blue 2x20ft BTB toes 2x20ft BTB toes 2x20ft          Tandem ambulation  10# KB 2 laps FW (25 ft) 15# 2 laps (25 feet) 10# KB x2 20ft  10# KB x2 20ft  15#          Tandem stance with ball pass  Foam 2x30\" ea  Foam, tandem, toss ball to wall in corner   1'  Foam tand ball toss at wall 2x sides corner 1' alternating  D/C           R SLS with L " "3-way vectors  10x  10x disc  D/C                          TherEx                TM on incline  5'  4%  2 mph 5' 4% incline 3 mph 5' 4% inc 3 mph 5' 4% 3 mph 5' 4% 3 mph          Calf stretch on slantboard 4x30\"  4x30\" L3  4\"x30 L3  D/C           Calf raises with ball X15   X15 on foam 30x on foam  30x on foam  D/C           Tband eversion Blue tb x20  Blue tb x20  Blue tb x20  D/C           Single leg step overs  X30\" ea  15x ea (lift over soft bolster) 15x (soft josh bolster)  D/C           Spider walks standing    Blue 10x *pain Yel band 10x  Hard blue  10x  Blue 2x10                                                                       Calf raises with unilateral lower     X20 ea  X20 ea          Step ups with PF    X15 ea 8\"  X15 ea           RDLs     2x10 4#  2x10 4#           3 way hip on foam     X10  X15 ea           Lunges     2x10 @rail  2x10 @ rail                                         Modalities                                                              "

## 2024-06-17 ENCOUNTER — OFFICE VISIT (OUTPATIENT)
Dept: PHYSICAL THERAPY | Age: 64
End: 2024-06-17
Payer: COMMERCIAL

## 2024-06-17 DIAGNOSIS — S93.491A SPRAIN OF ANTERIOR TALOFIBULAR LIGAMENT OF RIGHT ANKLE, INITIAL ENCOUNTER: Primary | ICD-10-CM

## 2024-06-17 DIAGNOSIS — R29.898 ANKLE WEAKNESS: ICD-10-CM

## 2024-06-17 PROCEDURE — 97110 THERAPEUTIC EXERCISES: CPT

## 2024-06-17 PROCEDURE — 97112 NEUROMUSCULAR REEDUCATION: CPT

## 2024-06-17 NOTE — PROGRESS NOTES
Daily Note     Today's date: 2024  Patient name: Abbi Claudio  : 1960  MRN: [id number]  Referring provider: Lachman, James R, MD  Dx:   Encounter Diagnosis     ICD-10-CM    1. Sprain of anterior talofibular ligament of right ankle, initial encounter  S93.491A       2. Ankle weakness  R29.898           Start Time: 0800  Stop Time: 0845  Total time in clinic (min): 45 minutes    Subjective: Reports no new concerns.      Objective: See treatment diary below      Assessment: Added SLS airplane with UE tap to cone with challenge, but was able to complete with minimal UE assist on bar. Fatigues quickly with ecc HR off step but is able to complete. No c/o elevated pain t/o session, just fatigue. Will continue to progress to challenge R ankle stability and overall balance. Patient would benefit from continued PT      Plan: Continue per plan of care.      POC expires Unit limit Auth Expiration date PT/OT + Visit Limit?   2024 BOMN  BOMN                           Visit/Unit Tracking  AUTH Status:  Date 3/21 3/28  4/2 4/4 4/8 4/10 4/16 4/18 4/23 4/25 4/30  5/2   No auth needed Used 1 2 3 4 5 6 7 8 9 10 11 12    Remaining                   FOTO  59(73)               AUTH Status:  Date 5/7 5/23 5/28  5/29 6/10 6/13 6/17         No auth needed  Used 13 14 15 16 17 18 19         Remaining                   FOTO                     Updated flowsheet 24   Manuals 5/23 5/28 5/29 6/10 6/13 6/17                                             Neuro Re-Ed                SL stance      + pallof press x10 gtb  + pallof press/rotation x10 gtb          Side stepping  Light tb toes 2x20ft  Med blue 2x20 ft Medium blue 2x20ft BTB toes 2x20ft BTB toes 2x20ft BTB toes 2x20ft         Tandem ambulation  10# KB 2 laps FW (25 ft) 15# 2 laps (25 feet) 10# KB x2 20ft  10# KB x2 20ft  15# KB x2 20ft          SLS airplane (touch cone)      10x ea                         TherEx                TM on incline  5'  4%  2 mph 5' 4%  "incline 3 mph 5' 4% inc 3 mph 5' 4% 3 mph 5' 4% 3 mph 5' 4% 3 mph         Spider walks standing    Blue 10x *pain Yel band 10x  Hard blue  10x  Blue 2x10  Medium blue 2x10                                                                      Calf raises with unilateral lower     X20 ea  X20 ea X20 ea          Step ups with PF    X15 ea 8\"  X15 ea  20x ea         RDLs     2x10 4#  2x10 4#  2x10 4#          3 way hip on foam     X10  X15 ea  15x ea         Lunges     2x10 @rail  2x10 @ rail  2x10 @ rail                                        Modalities                                                                "

## 2024-06-20 ENCOUNTER — OFFICE VISIT (OUTPATIENT)
Dept: PHYSICAL THERAPY | Age: 64
End: 2024-06-20
Payer: COMMERCIAL

## 2024-06-20 DIAGNOSIS — S93.491A SPRAIN OF ANTERIOR TALOFIBULAR LIGAMENT OF RIGHT ANKLE, INITIAL ENCOUNTER: Primary | ICD-10-CM

## 2024-06-20 PROCEDURE — 97110 THERAPEUTIC EXERCISES: CPT

## 2024-06-20 NOTE — PROGRESS NOTES
Daily Note     Today's date: 2024  Patient name: Abbi Claudio  : 1960  MRN: [id number]  Referring provider: Lachman, James R, MD  Dx:   Encounter Diagnosis     ICD-10-CM    1. Sprain of anterior talofibular ligament of right ankle, initial encounter  S93.491A           Start Time: 0710  Stop Time: 0745  Total time in clinic (min): 35 minutes    Subjective: Pt feels good. Her last appointment for PT and follow up with the doctor is next week.       Objective: Pt 10 minutes late for appointment. See treatment diary below      Assessment: Added single leg banded rotation around cones positioned laterally, ball throws to trampoline on half ball, and BOSU ball step ups. Progressed SL PF on step by adding a hop. Both challenged the patient's balance and ankle strength. Continued with airplanes for carry over and the patient demonstrated good technique. Tolerated treatment well. Patient demonstrated fatigue post treatment      Plan: Continue per plan of care.      POC expires Unit limit Auth Expiration date PT/OT + Visit Limit?   2024 BOMN  BOMN                           Visit/Unit Tracking  AUTH Status:  Date 3/21 3/28  4/2 4/4 4/8 4/10 4/16 4/18 4/23 4/25 4/30  5/2   No auth needed Used 1 2 3 4 5 6 7 8 9 10 11 12    Remaining                   FOTO  59(73)               AUTH Status:  Date 5/7 5/23 5/28  5/29 6/10 6/13 6/17  6/20       No auth needed  Used 13 14 15 16 17 18 19 20        Remaining                   FOTO                     Updated flowsheet 24   Manuals 5/23 5/28 5/29 6/10 6/13 6/17    6/20                                         Neuro Re-Ed                SL stance      + pallof press x10 gtb  + pallof press/rotation x10 gtb  + pallof press/rotation x10 gtb         Side stepping  Light tb toes 2x20ft  Med blue 2x20 ft Medium blue 2x20ft BTB toes 2x20ft BTB toes 2x20ft BTB toes 2x20ft         Tandem ambulation  10# KB 2 laps FW (25 ft) 15# 2 laps (25 feet) 10# KB x2 20ft  10#  "KB x2 20ft  15# KB x2 20ft          SLS airplane (touch cone)      10x ea  10x ea                        TherEx                TM on incline  5'  4%  2 mph 5' 4% incline 3 mph 5' 4% inc 3 mph 5' 4% 3 mph 5' 4% 3 mph 5' 4% 3 mph         Spider walks standing    Blue 10x *pain Yel band 10x  Hard blue  10x  Blue 2x10  Medium blue 2x10                                                                      Calf raises with unilateral lower     X20 ea  X20 ea X20 ea          Step ups with PF    X15 ea 8\"  X15 ea  20x ea 20x ea w/ jump         RDLs     2x10 4#  2x10 4#  2x10 4#          3 way hip on foam     X10  X15 ea  15x ea 15x ea         Lunges     2x10 @rail  2x10 @ rail  2x10 @ rail  2x10         Banded rotation around cones        Ytb ankles x10 ea         BOSU step ups        X10 ea         Modalities                                                                  "

## 2024-06-24 ENCOUNTER — OFFICE VISIT (OUTPATIENT)
Dept: PHYSICAL THERAPY | Age: 64
End: 2024-06-24
Payer: COMMERCIAL

## 2024-06-24 DIAGNOSIS — R29.898 ANKLE WEAKNESS: ICD-10-CM

## 2024-06-24 DIAGNOSIS — S93.491A SPRAIN OF ANTERIOR TALOFIBULAR LIGAMENT OF RIGHT ANKLE, INITIAL ENCOUNTER: Primary | ICD-10-CM

## 2024-06-24 PROCEDURE — 97110 THERAPEUTIC EXERCISES: CPT | Performed by: PHYSICAL THERAPIST

## 2024-06-24 NOTE — PROGRESS NOTES
Daily Note     Today's date: 2024  Patient name: Abbi Claudio  : 1960  MRN: [id number]  Referring provider: Lachman, James R, MD  Dx:   Encounter Diagnosis     ICD-10-CM    1. Sprain of anterior talofibular ligament of right ankle, initial encounter  S93.491A       2. Ankle weakness  R29.898                      Subjective: Patient notes she is feeling good. No pain. Able to go for 1 hour bike ride yesterday without issue. Also swimming without pain. Notes variable balance but feeling stronger.      Objective: See treatment diary below      Assessment: Tolerated treatment well. Patient exhibited good technique with therapeutic exercises Verbal cues for pacing to help with motor control. Subjective report much improved.       Plan: Continue per plan of care. Probable discharge next visit.      POC expires Unit limit Auth Expiration date PT/OT + Visit Limit?   2024 BOMN  BOMN                           Visit/Unit Tracking  AUTH Status:  Date 3/21 3/28  4/2 4/4 4/8 4/10 4/16 4/18 4/23 4/25 4/30  5/2   No auth needed Used 1 2 3 4 5 6 7 8 9 10 11 12    Remaining                   FOTO  59(73)               AUTH Status:  Date 5/7 5/23 5/28  5/29 6/10 6/13 6/17  6/20 6/24      No auth needed  Used 13 14 15 16 17 18 19 20 21       Remaining                   FOTO                     Updated flowsheet 24   Manuals 5/23 5/28 5/29 6/10 6/13 6/17    6/20 6/24                                        Neuro Re-Ed                SL stance      + pallof press x10 gtb  + pallof press/rotation x10 gtb  + pallof press/rotation x10 gtb  10x       Side stepping  Light tb toes 2x20ft  Med blue 2x20 ft Medium blue 2x20ft BTB toes 2x20ft BTB toes 2x20ft BTB toes 2x20ft  2x20ft       Tandem ambulation  10# KB 2 laps FW (25 ft) 15# 2 laps (25 feet) 10# KB x2 20ft  10# KB x2 20ft  15# KB x2 20ft   15# KB  2x       SLS airplane (touch cone)      10x ea  10x ea  10x each                      TherEx                TM on  "incline  5'  4%  2 mph 5' 4% incline 3 mph 5' 4% inc 3 mph 5' 4% 3 mph 5' 4% 3 mph 5' 4% 3 mph  5'       Spider walks standing    Blue 10x *pain Yel band 10x  Hard blue  10x  Blue 2x10  Medium blue 2x10   2x10                                                                   Calf raises with unilateral lower     X20 ea  X20 ea X20 ea   20x       Step ups with PF    X15 ea 8\"  X15 ea  20x ea 20x ea w/ jump  20x       RDLs     2x10 4#  2x10 4#  2x10 4#   4#  2x10       3 way hip on foam     X10  X15 ea  15x ea 15x ea  15x each       Lunges     2x10 @rail  2x10 @ rail  2x10 @ rail  2x10  2x10       Banded rotation around cones        Ytb ankles x10 ea         BOSU step ups        X10 ea  10x each       Modalities                                                                    "

## 2024-06-27 ENCOUNTER — APPOINTMENT (OUTPATIENT)
Dept: PHYSICAL THERAPY | Age: 64
End: 2024-06-27
Payer: COMMERCIAL

## 2024-06-27 NOTE — PROGRESS NOTES
Daily Note     Today's date: 2024  Patient name: Abbi Claudio  : 1960  MRN: [id number]  Referring provider: Lachman, James R, MD  Dx: No diagnosis found.               Subjective: Pt is still in agreement with discharge today       Objective: See treatment diary below      Assessment: Patient has met goals set during initial evaluation. Pt will be discharged from PT today. Tolerated treatment well. Patient demonstrated fatigue post treatment and exhibited good technique with therapeutic exercises      Plan:  Today is discharge.     POC expires Unit limit Auth Expiration date PT/OT + Visit Limit?   2024 BOMN  BOMN                           Visit/Unit Tracking  AUTH Status:  Date 3/21 3/28  4/2 4/4 4/8 4/10 4/16 4/18 4/23 4/25 4/30  5/2   No auth needed Used 1 2 3 4 5 6 7 8 9 10 11 12    Remaining                   FOTO  59(73)               AUTH Status:  Date 5/7 5/23 5/28  5/29 6/10 6/13 6/17  6/20 6/24 6/27     No auth needed  Used 13 14 15 16 17 18 19 20 21 22      Remaining                   FOTO                   Access Code: [email or web address]  URL: [email or web address]  Date: 2024  Prepared by: Laurita Rush    Exercises  - Single Leg Cone Touch  - 1 x daily - 7 x weekly - 3 sets - 10 reps  - Half Deadlift with Kettlebell  - 1 x daily - 7 x weekly - 3 sets - 10 reps  - Single-Leg [place] Deadlift With Kettlebell  - 1 x daily - 7 x weekly - 3 sets - 10 reps  - Standing Hip Abduction Kicks  - 1 x daily - 7 x weekly - 3 sets - 10 reps  - Standing Hip Extension Kicks  - 1 x daily - 7 x weekly - 3 sets - 10 reps  - Standing Hip Flexion with Anchored Resistance and Chair Support  - 1 x daily - 7 x weekly - 3 sets - 10 reps  - Standing Hip Extension with Anchored Resistance  - 1 x daily - 7 x weekly - 3 sets - 10 reps  - Standard Lunge  - 1 x daily - 7 x weekly - 3 sets - 10 reps    Updated flowsheet 24   Manuals 5/23 5/28 5/29 6/10 6/13 6/17    6/20 6/24 6/27                   "                     Neuro Re-Ed                SL stance      + pallof press x10 gtb  + pallof press/rotation x10 gtb  + pallof press/rotation x10 gtb  10x       Side stepping  Light tb toes 2x20ft  Med blue 2x20 ft Medium blue 2x20ft BTB toes 2x20ft BTB toes 2x20ft BTB toes 2x20ft  2x20ft       Tandem ambulation  10# KB 2 laps FW (25 ft) 15# 2 laps (25 feet) 10# KB x2 20ft  10# KB x2 20ft  15# KB x2 20ft   15# KB  2x       SLS airplane (touch cone)      10x ea  10x ea  10x each W/ cane                      TherEx                TM on incline  5'  4%  2 mph 5' 4% incline 3 mph 5' 4% inc 3 mph 5' 4% 3 mph 5' 4% 3 mph 5' 4% 3 mph  5'       Spider walks standing    Blue 10x *pain Yel band 10x  Hard blue  10x  Blue 2x10  Medium blue 2x10   2x10                                                                   Calf raises with unilateral lower     X20 ea  X20 ea X20 ea   20x       Step ups with PF    X15 ea 8\"  X15 ea  20x ea 20x ea w/ jump  20x       RDLs     2x10 4#  2x10 4#  2x10 4#   4#  2x10       3 way hip on foam     X10  X15 ea  15x ea 15x ea  15x each       Lunges     2x10 @rail  2x10 @ rail  2x10 @ rail  2x10  2x10       Banded rotation around cones        Ytb ankles x10 ea         BOSU step ups        X10 ea  10x each       Modalities                                                                      "

## 2024-07-17 ENCOUNTER — OFFICE VISIT (OUTPATIENT)
Dept: OBGYN CLINIC | Facility: CLINIC | Age: 64
End: 2024-07-17
Payer: COMMERCIAL

## 2024-07-17 VITALS
HEART RATE: 53 BPM | HEIGHT: 69 IN | WEIGHT: 195 LBS | SYSTOLIC BLOOD PRESSURE: 115 MMHG | BODY MASS INDEX: 28.88 KG/M2 | DIASTOLIC BLOOD PRESSURE: 75 MMHG

## 2024-07-17 DIAGNOSIS — S93.491A SPRAIN OF ANTERIOR TALOFIBULAR LIGAMENT OF RIGHT ANKLE, INITIAL ENCOUNTER: Primary | ICD-10-CM

## 2024-07-17 PROCEDURE — 99213 OFFICE O/P EST LOW 20 MIN: CPT | Performed by: ORTHOPAEDIC SURGERY

## 2024-07-17 NOTE — PROGRESS NOTES
James R Lachman, M.D.  Attending, Orthopaedic Surgery  Foot and Ankle  [facility]      ORTHOPAEDIC FOOT AND ANKLE CLINIC VISIT     Assessment:     Encounter Diagnosis   Name Primary?    Sprain of anterior talofibular ligament of right ankle, initial encounter Yes        Plan:   The patient verbalized understanding of exam findings and treatment plan. We engaged in the shared decision-making process and treatment options were discussed at length with the patient. Surgical and conservative management discussed today along with risks and benefits.  Patient has been treating for a right ankle sprain in PT/HEP with noted benefit.   She was instructed to continue with her home exercise program   May gradually return to normal activities as tolerated  Wear supportive shoe wear  Rest, ice and activity modifications as needed  Return if symptoms worsen or fail to improve.      History of Present Illness:   Chief Complaint:   Chief Complaint   Patient presents with    Follow-up     Follow up of the right ankle. Everything going well she finished PT.     Abbi Claudio is a 63 y.o. female who is being seen in follow-up for Right ankle sprain. When we last saw she we recommended PT/HEP.  Pain has improved. Residual pain is localized at anterolateral ankle with minimal radiating and described as mild and aching    Prior treatment   NSAIDsYes   Injections No   Bracing/Orthotics Yes    Physical Therapy Yes     Orthopedic Surgical History:   See below    Past Medical, Surgical and Social History:  Past Medical History:  has a past medical history of Fatigue, No pertinent past medical history, and SOB (shortness of breath).  Problem List: does not have any pertinent problems on file.  Past Surgical History:  has a past surgical history that includes Tubal ligation; Varicose vein surgery; Thyroidectomy, partial; Rotator cuff repair (Right); Ankle surgery; Mass excision (Right, 5/13/2022); and Chest  "wall biopsy (Left, 5/13/2022).  Family History: family history includes Diabetes in her father; Lymphoma in her daughter and sister; No Known Problems in her daughter, daughter, maternal aunt, maternal grandfather, maternal grandmother, mother, paternal grandfather, paternal grandmother, and sister.  Social History:  reports that she has never smoked. She has never used smokeless tobacco. She reports that she does not currently use alcohol. She reports that she does not currently use drugs.  Current Medications: has a current medication list which includes the following prescription(s): albuterol.  Allergies: has No Known Allergies.     Review of Systems:  General- denies fever/chills  HEENT- denies hearing loss or sore throat  Eyes- denies eye pain or visual disturbances, denies red eyes  Respiratory- denies cough or SOB  Cardio- denies chest pain or palpitations  GI- denies abdominal pain  Endocrine- denies urinary frequency  Urinary- denies pain with urination  Musculoskeletal- Negative except noted above  Skin- denies rashes or wounds  Neurological- denies dizziness or headache  Psychiatric- denies anxiety or difficulty concentrating    Physical Exam:   /75   Pulse (!) 53   Ht 5' 9\" (1.753 m)   Wt 88.5 kg (195 lb)   BMI 28.80 kg/m²   General/Constitutional: No apparent distress: well-nourished and well developed.  Eyes: normal ocular motion  Lymphatic: No appreciable lymphadenopathy  Respiratory: Non-labored breathing  Vascular: No edema, swelling or tenderness, except as noted in detailed exam.  Integumentary: No impressive skin lesions present, except as noted in detailed exam.  Neuro: No ataxia or tremors noted  Psych: Normal mood and affect, oriented to person, place and time. Appropriate affect.  Musculoskeletal: Normal, except as noted in detailed exam and in HPI.    Examination    Right    Gait Normal   Musculoskeletal No TTP    Skin Normal.    Nails Normal    Range of Motion  20 degrees " dorsiflexion, 30 degrees plantarflexion  Subtalar motion: normal    Stability Stable    Muscle Strength 5/5 tibialis anterior  5/5 gastrocnemius-soleus  5/5 posterior tibialis  5/5 peroneal/eversion strength  5/5 EHL  5/5 FHL    Neurologic Normal    Sensation Intact to light touch throughout sural, saphenous, superficial peroneal, deep peroneal and medial/lateral plantar nerve distributions.  [place] 5.07 filament (10g) testing deferred.    Cardiovascular Brisk capillary refill < 2 seconds,intact DP and PT pulses    Special Tests Negative anterior drawer      Imaging Studies:   No new imaging    James R. Lachman, MD  Foot & Ankle Surgery   Department of Orthopaedic Surgery  [facility]      I personally performed the service.    James R. Lachman, MD    Scribe Attestation      I,:  Ceasar Walker am acting as a scribe while in the presence of the attending physician.:       I,:  James R Lachman, MD personally performed the services described in this documentation    as scribed in my presence.:

## 2024-08-28 ENCOUNTER — APPOINTMENT (OUTPATIENT)
Dept: LAB | Facility: CLINIC | Age: 64
End: 2024-08-28
Payer: COMMERCIAL

## 2024-08-28 DIAGNOSIS — R10.9 STOMACH ACHE: ICD-10-CM

## 2024-08-28 DIAGNOSIS — Z00.8 HEALTH EXAMINATION IN POPULATION SURVEY: ICD-10-CM

## 2024-08-28 LAB
ALBUMIN SERPL BCG-MCNC: 4.2 G/DL (ref 3.5–5)
ALP SERPL-CCNC: 65 U/L (ref 34–104)
ALT SERPL W P-5'-P-CCNC: 11 U/L (ref 7–52)
AMYLASE SERPL-CCNC: 37 IU/L (ref 29–103)
ANION GAP SERPL CALCULATED.3IONS-SCNC: 5 MMOL/L (ref 4–13)
AST SERPL W P-5'-P-CCNC: 13 U/L (ref 13–39)
BASOPHILS # BLD AUTO: 0.07 THOUSANDS/ÂΜL (ref 0–0.1)
BASOPHILS NFR BLD AUTO: 2 % (ref 0–1)
BILIRUB SERPL-MCNC: 1.06 MG/DL (ref 0.2–1)
BUN SERPL-MCNC: 20 MG/DL (ref 5–25)
CALCIUM SERPL-MCNC: 8.9 MG/DL (ref 8.4–10.2)
CHLORIDE SERPL-SCNC: 105 MMOL/L (ref 96–108)
CHOLEST SERPL-MCNC: 151 MG/DL
CO2 SERPL-SCNC: 29 MMOL/L (ref 21–32)
CREAT SERPL-MCNC: 0.68 MG/DL (ref 0.6–1.3)
EOSINOPHIL # BLD AUTO: 0.13 THOUSAND/ÂΜL (ref 0–0.61)
EOSINOPHIL NFR BLD AUTO: 3 % (ref 0–6)
ERYTHROCYTE [DISTWIDTH] IN BLOOD BY AUTOMATED COUNT: 12.6 % (ref 11.6–15.1)
ERYTHROCYTE [SEDIMENTATION RATE] IN BLOOD: 12 MM/HOUR (ref 0–29)
EST. AVERAGE GLUCOSE BLD GHB EST-MCNC: 120 MG/DL
GFR SERPL CREATININE-BSD FRML MDRD: 94 ML/MIN/1.73SQ M
GLUCOSE P FAST SERPL-MCNC: 96 MG/DL (ref 65–99)
HBA1C MFR BLD: 5.8 %
HCT VFR BLD AUTO: 38.2 % (ref 34.8–46.1)
HDLC SERPL-MCNC: 48 MG/DL
HGB BLD-MCNC: 12.4 G/DL (ref 11.5–15.4)
IMM GRANULOCYTES # BLD AUTO: 0.01 THOUSAND/UL (ref 0–0.2)
IMM GRANULOCYTES NFR BLD AUTO: 0 % (ref 0–2)
LDLC SERPL CALC-MCNC: 94 MG/DL (ref 0–100)
LIPASE SERPL-CCNC: 40 U/L (ref 11–82)
LYMPHOCYTES # BLD AUTO: 1.56 THOUSANDS/ÂΜL (ref 0.6–4.47)
LYMPHOCYTES NFR BLD AUTO: 34 % (ref 14–44)
MCH RBC QN AUTO: 30.2 PG (ref 26.8–34.3)
MCHC RBC AUTO-ENTMCNC: 32.5 G/DL (ref 31.4–37.4)
MCV RBC AUTO: 93 FL (ref 82–98)
MONOCYTES # BLD AUTO: 0.37 THOUSAND/ÂΜL (ref 0.17–1.22)
MONOCYTES NFR BLD AUTO: 8 % (ref 4–12)
NEUTROPHILS # BLD AUTO: 2.47 THOUSANDS/ÂΜL (ref 1.85–7.62)
NEUTS SEG NFR BLD AUTO: 53 % (ref 43–75)
NONHDLC SERPL-MCNC: 103 MG/DL
NRBC BLD AUTO-RTO: 0 /100 WBCS
PLATELET # BLD AUTO: 206 THOUSANDS/UL (ref 149–390)
PMV BLD AUTO: 11.9 FL (ref 8.9–12.7)
POTASSIUM SERPL-SCNC: 4.1 MMOL/L (ref 3.5–5.3)
PROT SERPL-MCNC: 6.7 G/DL (ref 6.4–8.4)
RBC # BLD AUTO: 4.11 MILLION/UL (ref 3.81–5.12)
SODIUM SERPL-SCNC: 139 MMOL/L (ref 135–147)
TRIGL SERPL-MCNC: 47 MG/DL
WBC # BLD AUTO: 4.61 THOUSAND/UL (ref 4.31–10.16)

## 2024-08-28 PROCEDURE — 85652 RBC SED RATE AUTOMATED: CPT

## 2024-08-28 PROCEDURE — 36415 COLL VENOUS BLD VENIPUNCTURE: CPT

## 2024-08-28 PROCEDURE — 83690 ASSAY OF LIPASE: CPT

## 2024-08-28 PROCEDURE — 85025 COMPLETE CBC W/AUTO DIFF WBC: CPT

## 2024-08-28 PROCEDURE — 80061 LIPID PANEL: CPT

## 2024-08-28 PROCEDURE — 83036 HEMOGLOBIN GLYCOSYLATED A1C: CPT

## 2024-08-28 PROCEDURE — 82150 ASSAY OF AMYLASE: CPT

## 2024-08-28 PROCEDURE — 80053 COMPREHEN METABOLIC PANEL: CPT

## 2024-09-04 ENCOUNTER — HOSPITAL ENCOUNTER (OUTPATIENT)
Dept: ULTRASOUND IMAGING | Facility: HOSPITAL | Age: 64
Discharge: HOME | End: 2024-09-04
Payer: COMMERCIAL

## 2024-09-04 DIAGNOSIS — R10.9 UNSPECIFIED ABDOMINAL PAIN: ICD-10-CM

## 2024-09-04 PROCEDURE — 76700 US EXAM ABDOM COMPLETE: CPT

## 2024-09-04 PROCEDURE — 76830 TRANSVAGINAL US NON-OB: CPT

## 2024-09-04 PROCEDURE — 76856 US EXAM PELVIC COMPLETE: CPT

## 2024-09-17 PROCEDURE — 88305 TISSUE EXAM BY PATHOLOGIST: CPT | Performed by: PATHOLOGY

## 2024-09-18 ENCOUNTER — LAB REQUISITION (OUTPATIENT)
Dept: LAB | Facility: HOSPITAL | Age: 64
End: 2024-09-18
Payer: COMMERCIAL

## 2024-09-18 DIAGNOSIS — N85.00 ENDOMETRIAL HYPERPLASIA, UNSPECIFIED: ICD-10-CM

## 2024-09-20 PROCEDURE — 88305 TISSUE EXAM BY PATHOLOGIST: CPT | Performed by: PATHOLOGY

## 2024-11-18 PROCEDURE — G0145 SCR C/V CYTO,THINLAYER,RESCR: HCPCS | Performed by: SPECIALIST

## 2024-11-20 ENCOUNTER — LAB REQUISITION (OUTPATIENT)
Dept: LAB | Facility: HOSPITAL | Age: 64
End: 2024-11-20
Payer: COMMERCIAL

## 2024-11-20 DIAGNOSIS — Z01.419 ENCOUNTER FOR GYNECOLOGICAL EXAMINATION (GENERAL) (ROUTINE) WITHOUT ABNORMAL FINDINGS: ICD-10-CM

## 2024-11-26 LAB
LAB AP GYN PRIMARY INTERPRETATION: NORMAL
Lab: NORMAL

## 2025-01-21 ENCOUNTER — HOSPITAL ENCOUNTER (OUTPATIENT)
Dept: RADIOLOGY | Facility: HOSPITAL | Age: 65
Discharge: HOME | End: 2025-01-21
Payer: COMMERCIAL

## 2025-01-21 DIAGNOSIS — M54.6 ACUTE RIGHT-SIDED THORACIC BACK PAIN: ICD-10-CM

## 2025-01-21 PROCEDURE — 71101 X-RAY EXAM UNILAT RIBS/CHEST: CPT

## 2025-01-27 ENCOUNTER — HOSPITAL ENCOUNTER (OUTPATIENT)
Dept: MAMMOGRAPHY | Facility: HOSPITAL | Age: 65
Discharge: HOME | End: 2025-01-27
Attending: SPECIALIST
Payer: COMMERCIAL

## 2025-01-27 DIAGNOSIS — Z12.31 ENCOUNTER FOR SCREENING MAMMOGRAM FOR MALIGNANT NEOPLASM OF BREAST: ICD-10-CM

## 2025-01-27 PROCEDURE — 77063 BREAST TOMOSYNTHESIS BI: CPT

## 2025-01-27 PROCEDURE — 77067 SCR MAMMO BI INCL CAD: CPT

## 2025-04-18 ENCOUNTER — HOSPITAL ENCOUNTER (EMERGENCY)
Facility: HOSPITAL | Age: 65
Discharge: HOME | End: 2025-04-18
Attending: EMERGENCY MEDICINE
Payer: COMMERCIAL

## 2025-04-18 ENCOUNTER — APPOINTMENT (EMERGENCY)
Dept: RADIOLOGY | Facility: HOSPITAL | Age: 65
End: 2025-04-18
Payer: COMMERCIAL

## 2025-04-18 VITALS
RESPIRATION RATE: 22 BRPM | TEMPERATURE: 97.8 F | DIASTOLIC BLOOD PRESSURE: 78 MMHG | SYSTOLIC BLOOD PRESSURE: 149 MMHG | OXYGEN SATURATION: 100 % | HEART RATE: 84 BPM

## 2025-04-18 DIAGNOSIS — J20.9 ACUTE BRONCHITIS: Primary | ICD-10-CM

## 2025-04-18 LAB
FLUAV AG UPPER RESP QL IA.RAPID: NEGATIVE
FLUBV AG UPPER RESP QL IA.RAPID: NEGATIVE
SARS-COV+SARS-COV-2 AG RESP QL IA.RAPID: NEGATIVE

## 2025-04-18 PROCEDURE — 99285 EMERGENCY DEPT VISIT HI MDM: CPT

## 2025-04-18 PROCEDURE — 71046 X-RAY EXAM CHEST 2 VIEWS: CPT

## 2025-04-18 PROCEDURE — 99284 EMERGENCY DEPT VISIT MOD MDM: CPT | Performed by: EMERGENCY MEDICINE

## 2025-04-18 PROCEDURE — 87811 SARS-COV-2 COVID19 W/OPTIC: CPT

## 2025-04-18 PROCEDURE — 87804 INFLUENZA ASSAY W/OPTIC: CPT

## 2025-04-18 RX ORDER — DOXYCYCLINE 100 MG/1
100 CAPSULE ORAL 2 TIMES DAILY
Qty: 14 CAPSULE | Refills: 0 | Status: SHIPPED | OUTPATIENT
Start: 2025-04-18 | End: 2025-04-25

## 2025-04-18 RX ORDER — BENZONATATE 100 MG/1
100 CAPSULE ORAL 3 TIMES DAILY PRN
Qty: 20 CAPSULE | Refills: 0 | Status: SHIPPED | OUTPATIENT
Start: 2025-04-18

## 2025-04-18 RX ORDER — BENZONATATE 100 MG/1
100 CAPSULE ORAL ONCE
Status: COMPLETED | OUTPATIENT
Start: 2025-04-18 | End: 2025-04-18

## 2025-04-18 RX ADMIN — BENZONATATE 100 MG: 100 CAPSULE ORAL at 15:32

## 2025-04-18 NOTE — ED PROVIDER NOTES
Time reflects when diagnosis was documented in both MDM as applicable and the Disposition within this note       Time User Action Codes Description Comment    4/18/2025  3:16 PM Cora Huynh Add [J20.9] Acute bronchitis           ED Disposition       ED Disposition   Discharge    Condition   Stable    Date/Time   Fri Apr 18, 2025  3:16 PM    Comment   Abbi González discharge to home/self care.                   Assessment & Plan       Medical Decision Making  63 y/o female here for cough, body aches, and headache since yesterday. Denies fever, chest pain, dizziness/lightheadedness, abdominal pain, N/V/D, urinary symptoms.  Patient is well-appearing on exam, in no distress.  Vitals are normal.  Ordered chest x-ray to look for pneumonia and ordered COVID/Flu swab, which was negative. Unable to rule out pneumonia, however did not see definite pneumonia on the chest x-ray as patient is slightly rotated.  Will treat with Doxy prophylactically since patient had pneumonia last year with similar symptoms and had to be hospitalized.  Discussed supportive care, follow-up, risks and return precautions.  Patient was agreeable to plan and was discharged home.        Amount and/or Complexity of Data Reviewed  Labs: ordered. Decision-making details documented in ED Course.  Radiology: ordered. Decision-making details documented in ED Course.    Risk  Prescription drug management.        ED Course as of 04/18/25 1522   Fri Apr 18, 2025   1513 SARS COV Rapid Antigen: Negative   1513 Influenza A Rapid Antigen: Negative   1513 Influenza B Rapid Antigen: Negative   1513 XR chest 2 views  No definite pneumonia seen, but patient is slightly rotated, obscuring some view       Medications   benzonatate (TESSALON PERLES) capsule 100 mg (has no administration in time range)       ED Risk Strat Scores                    No data recorded                            History of Present Illness       Chief Complaint   Patient presents  with    Cough     C/o headaches, body aches for a week, pt also c/o productive cough since yesterday.        Past Medical History:   Diagnosis Date    Fatigue     No pertinent past medical history     SOB (shortness of breath)       Past Surgical History:   Procedure Laterality Date    ANKLE SURGERY      CHEST WALL BIOPSY Left 5/13/2022    Procedure: EXCISION  BIOPSY LEFT CHEST WALL SOFT TISSUE MASS;  Surgeon: Rishi Kessler MD;  Location: [facility];  Service: General    MASS EXCISION Right 5/13/2022    Procedure: EXCISION BIOPSY TISSUE LESION/MASS LOWER EXTREMITY RIGHT POSTERIOR THIGH;  Surgeon: Rishi Kessler MD;  Location: [facility];  Service: General    ROTATOR CUFF REPAIR Right     THYROIDECTOMY, PARTIAL      TUBAL LIGATION      VARICOSE VEIN SURGERY        Family History   Problem Relation Age of Onset    No Known Problems Mother     Diabetes Father     Lymphoma Sister     No Known Problems Sister     No Known Problems Daughter     Lymphoma Daughter     No Known Problems Daughter     No Known Problems Maternal Grandmother     No Known Problems Maternal Grandfather     No Known Problems Paternal Grandmother     No Known Problems Paternal Grandfather     No Known Problems Maternal Aunt     Breast cancer Neg Hx     BRCA2 Positive Neg Hx     BRCA2 Negative Neg Hx     BRCA1 Positive Neg Hx     BRCA1 Negative Neg Hx     BRCA 1/2 Neg Hx     Ovarian cancer Neg Hx     Endometrial cancer Neg Hx     Colon cancer Neg Hx     Breast cancer additional onset Neg Hx       Social History     Tobacco Use    Smoking status: Never    Smokeless tobacco: Never   Vaping Use    Vaping status: Never Used   Substance Use Topics    Alcohol use: Not Currently    Drug use: Not Currently      E-Cigarette/Vaping    E-Cigarette Use Never User       E-Cigarette/Vaping Substances    Nicotine No     THC No     CBD No     Flavoring No     Other No     Unknown No       I have reviewed and agree with the history as documented.     Patient is a  63 y/o female here for cough, body aches, and headache since yesterday. States she is worried because this time last year she got pneumonia and was hospitalized for it. Denies fever, chest pain, dizziness/lightheadedness, abdominal pain, N/V/D, urinary symptoms.       Cough  Associated symptoms: chills, headaches and myalgias    Associated symptoms: no chest pain, no ear pain, no fever, no rash, no rhinorrhea, no sore throat and no wheezing        Review of Systems   Constitutional:  Positive for chills. Negative for fever.   HENT:  Negative for congestion, ear pain, rhinorrhea and sore throat.    Eyes:  Negative for pain and visual disturbance.   Respiratory:  Positive for cough. Negative for chest tightness and wheezing.    Cardiovascular:  Negative for chest pain and palpitations.   Gastrointestinal:  Negative for abdominal pain, diarrhea, nausea and vomiting.   Genitourinary:  Negative for difficulty urinating, dysuria, flank pain, frequency, hematuria and urgency.   Musculoskeletal:  Positive for myalgias. Negative for arthralgias, back pain, neck pain and neck stiffness.   Skin:  Negative for color change and rash.   Neurological:  Positive for headaches. Negative for dizziness, seizures, syncope, weakness and light-headedness.   All other systems reviewed and are negative.      Objective       ED Triage Vitals [04/18/25 1410]   Temperature Pulse Blood Pressure Respirations SpO2 Patient Position - Orthostatic VS   97.8 °F (36.6 °C) 84 149/78 22 100 % Sitting      Temp Source Heart Rate Source BP Location FiO2 (%) Pain Score    Temporal Monitor Left arm -- --      Vitals      Date and Time Temp Pulse SpO2 Resp BP Pain Score FACES Pain Rating User   04/18/25 1410 97.8 °F (36.6 °C) 84 100 % 22 149/78 -- -- AS            Physical Exam  Vitals and nursing note reviewed.   Constitutional:       General: She is not in acute distress.     Appearance: Normal appearance. She is not ill-appearing, toxic-appearing or  diaphoretic.   HENT:      Head: Normocephalic.      Nose: Nose normal.      Mouth/Throat:      Mouth: Mucous membranes are moist.      Pharynx: Oropharynx is clear. No oropharyngeal exudate or posterior oropharyngeal erythema.   Eyes:      Extraocular Movements: Extraocular movements intact.      Conjunctiva/sclera: Conjunctivae normal.      Pupils: Pupils are equal, round, and reactive to light.   Cardiovascular:      Rate and Rhythm: Normal rate and regular rhythm.      Pulses: Normal pulses.      Heart sounds: Normal heart sounds.   Pulmonary:      Effort: Pulmonary effort is normal. No tachypnea, accessory muscle usage or respiratory distress.      Breath sounds: Normal breath sounds. No stridor. No wheezing, rhonchi or rales.   Chest:      Chest wall: No tenderness.   Abdominal:      General: Abdomen is flat. There is no distension.      Palpations: Abdomen is soft.      Tenderness: There is no abdominal tenderness. There is no guarding or rebound.   Musculoskeletal:         General: Normal range of motion.      Cervical back: Normal range of motion and neck supple.   Skin:     General: Skin is warm and dry.      Capillary Refill: Capillary refill takes less than 2 seconds.      Coloration: Skin is not cyanotic or pale.   Neurological:      General: No focal deficit present.      Mental Status: She is alert and oriented to person, place, and time.   Psychiatric:         Mood and Affect: Mood normal.         Behavior: Behavior normal.         Thought Content: Thought content normal.         Judgment: Judgment normal.         Results Reviewed       Procedure Component Value Units Date/Time    FLU/COVID Rapid Antigen (30 min. TAT) - Preferred screening test in ED [[id number]]  (Normal) Collected: 04/18/25 1447    Lab Status: Final result Specimen: Nares from Nose Updated: 04/18/25 1513     SARS COV Rapid Antigen Negative     Influenza A Rapid Antigen Negative     Influenza B Rapid Antigen Negative    Narrative:       This test has been performed using the Quidel Amaya 2 FLU+SARS Antigen test under the Emergency Use Authorization (EUA). This test has been validated by the  and verified by the performing laboratory. The Amaya uses lateral flow immunofluorescent sandwich assay to detect SARS-COV, Influenza A and Influenza B Antigen.     The Quidel Amaya 2 SARS Antigen test does not differentiate between SARS-CoV and SARS-CoV-2.     Negative results are presumptive and may be confirmed with a molecular assay, if necessary, for patient management. Negative results do not rule out SARS-CoV-2 or influenza infection and should not be used as the sole basis for treatment or patient management decisions. A negative test result may occur if the level of antigen in a sample is below the limit of detection of this test.     Positive results are indicative of the presence of viral antigens, but do not rule out bacterial infection or co-infection with other viruses.     All test results should be used as an adjunct to clinical observations and other information available to the provider.    FOR PEDIATRIC PATIENTS - copy/paste COVID Guidelines URL to browser: [email or web address]            XR chest 2 views    (Results Pending)       Procedures    ED Medication and Procedure Management   Prior to Admission Medications   Prescriptions Last Dose Informant Patient Reported? Taking?   albuterol (Ventolin HFA) 90 mcg/act inhaler   Yes No   Sig: Inhale 2 puffs every 6 (six) hours   Patient not taking: Reported on 5/2/2024      Facility-Administered Medications: None     Patient's Medications   Discharge Prescriptions    BENZONATATE (TESSALON PERLES) 100 MG CAPSULE    Take 1 capsule (100 mg total) by mouth 3 (three) times a day as needed for cough       Start Date: 4/18/2025 End Date: --       Order Dose: 100 mg       Quantity: 20 capsule    Refills: 0    DOXYCYCLINE HYCLATE  (VIBRAMYCIN) 100 MG CAPSULE    Take 1 capsule (100 mg total) by mouth 2 (two) times a day for 7 days       Start Date: 4/18/2025 End Date: 4/25/2025       Order Dose: 100 mg       Quantity: 14 capsule    Refills: 0     No discharge procedures on file.  ED SEPSIS DOCUMENTATION   Time reflects when diagnosis was documented in both MDM as applicable and the Disposition within this note       Time User Action Codes Description Comment    4/18/2025  3:16 PM Cora Huynh Add [J20.9] Acute bronchitis                  Cora Huynh PA-C  04/18/25 1522

## 2025-04-18 NOTE — DISCHARGE INSTRUCTIONS
Take doxycycline as prescribed, take about 20 minutes after food, as it can be hard on the stomach. Take Tessalon Perles as needed for cough. May also try a tablespoon of honey, Robitussin or Delsym for cough. If symptoms worsen, return to the emergency department for re-evaluation.

## 2025-04-18 NOTE — ED ATTENDING ATTESTATION
4/18/2025  I, Dany Delacruz MD, saw and evaluated the patient. I have discussed the patient with the resident/non-physician practitioner and agree with the resident's/non-physician practitioner's findings, Plan of Care, and MDM as documented in the resident's/non-physician practitioner's note, except where noted. All available labs and Radiology studies were reviewed.  I was present for key portions of any procedure(s) performed by the resident/non-physician practitioner and I was immediately available to provide assistance.       At this point I agree with the current assessment done in the Emergency Department.  I have conducted an independent evaluation of this patient a history and physical is as follows: Is a 64-year-old female presents emergency department with cough and congestion, planes of some chills and bodyaches.  Physical exam alert and awake 64-year-old female in no acute distress, breath sounds show no wheezing, good air movement, no hypoxia, there is some coarse congestion.  Pulse oximetry normal, flu is negative.    ED Course     This with patient and the practitioner consistent with bronchitis we will treat with antibiotics.  Patient agrees.  Risk-benefit discussed.  Shared decision making.  Critical Care Time  Procedures

## 2025-05-05 ENCOUNTER — APPOINTMENT (OUTPATIENT)
Dept: LAB | Facility: CLINIC | Age: 65
End: 2025-05-05
Payer: COMMERCIAL

## 2025-05-05 DIAGNOSIS — R10.9 STOMACH ACHE: ICD-10-CM

## 2025-05-05 DIAGNOSIS — R93.5 ABNORMAL ABDOMINAL ULTRASOUND: ICD-10-CM

## 2025-05-05 DIAGNOSIS — Z00.8 HEALTH EXAMINATION IN POPULATION SURVEY: ICD-10-CM

## 2025-05-05 DIAGNOSIS — Z13.6 SCREENING FOR ISCHEMIC HEART DISEASE: ICD-10-CM

## 2025-05-05 LAB
ALBUMIN SERPL BCG-MCNC: 4.2 G/DL (ref 3.5–5)
ALP SERPL-CCNC: 69 U/L (ref 34–104)
ALT SERPL W P-5'-P-CCNC: 14 U/L (ref 7–52)
ANION GAP SERPL CALCULATED.3IONS-SCNC: 8 MMOL/L (ref 4–13)
AST SERPL W P-5'-P-CCNC: 16 U/L (ref 13–39)
BILIRUB SERPL-MCNC: 1.02 MG/DL (ref 0.2–1)
BILIRUB UR QL STRIP: NEGATIVE
BUN SERPL-MCNC: 16 MG/DL (ref 5–25)
CALCIUM SERPL-MCNC: 9 MG/DL (ref 8.4–10.2)
CHLORIDE SERPL-SCNC: 105 MMOL/L (ref 96–108)
CHOLEST SERPL-MCNC: 155 MG/DL (ref ?–200)
CLARITY UR: CLEAR
CO2 SERPL-SCNC: 28 MMOL/L (ref 21–32)
COLOR UR: COLORLESS
CREAT SERPL-MCNC: 0.58 MG/DL (ref 0.6–1.3)
ERYTHROCYTE [DISTWIDTH] IN BLOOD BY AUTOMATED COUNT: 12.6 % (ref 11.6–15.1)
EST. AVERAGE GLUCOSE BLD GHB EST-MCNC: 123 MG/DL
GFR SERPL CREATININE-BSD FRML MDRD: 98 ML/MIN/1.73SQ M
GLUCOSE P FAST SERPL-MCNC: 95 MG/DL (ref 65–99)
GLUCOSE UR STRIP-MCNC: NEGATIVE MG/DL
HBA1C MFR BLD: 5.9 %
HCT VFR BLD AUTO: 39.3 % (ref 34.8–46.1)
HDLC SERPL-MCNC: 50 MG/DL
HGB BLD-MCNC: 12.6 G/DL (ref 11.5–15.4)
HGB UR QL STRIP.AUTO: NEGATIVE
KETONES UR STRIP-MCNC: NEGATIVE MG/DL
LDLC SERPL CALC-MCNC: 96 MG/DL (ref 0–100)
LEUKOCYTE ESTERASE UR QL STRIP: NEGATIVE
MCH RBC QN AUTO: 29.9 PG (ref 26.8–34.3)
MCHC RBC AUTO-ENTMCNC: 32.1 G/DL (ref 31.4–37.4)
MCV RBC AUTO: 93 FL (ref 82–98)
NITRITE UR QL STRIP: NEGATIVE
NONHDLC SERPL-MCNC: 105 MG/DL
PH UR STRIP.AUTO: 6 [PH]
PLATELET # BLD AUTO: 209 THOUSANDS/UL (ref 149–390)
PMV BLD AUTO: 11.6 FL (ref 8.9–12.7)
POTASSIUM SERPL-SCNC: 4.1 MMOL/L (ref 3.5–5.3)
PROT SERPL-MCNC: 6.7 G/DL (ref 6.4–8.4)
PROT UR STRIP-MCNC: NEGATIVE MG/DL
RBC # BLD AUTO: 4.21 MILLION/UL (ref 3.81–5.12)
SODIUM SERPL-SCNC: 141 MMOL/L (ref 135–147)
SP GR UR STRIP.AUTO: 1.01 (ref 1–1.03)
TRIGL SERPL-MCNC: 44 MG/DL (ref ?–150)
UROBILINOGEN UR STRIP-ACNC: <2 MG/DL
WBC # BLD AUTO: 4.36 THOUSAND/UL (ref 4.31–10.16)

## 2025-05-05 PROCEDURE — 36415 COLL VENOUS BLD VENIPUNCTURE: CPT

## 2025-05-05 PROCEDURE — 85027 COMPLETE CBC AUTOMATED: CPT

## 2025-05-05 PROCEDURE — 81003 URINALYSIS AUTO W/O SCOPE: CPT

## 2025-05-05 PROCEDURE — 80053 COMPREHEN METABOLIC PANEL: CPT

## 2025-05-05 PROCEDURE — 80061 LIPID PANEL: CPT

## 2025-05-05 PROCEDURE — 83036 HEMOGLOBIN GLYCOSYLATED A1C: CPT

## 2025-05-12 ENCOUNTER — PREP FOR PROCEDURE (OUTPATIENT)
Dept: OBGYN CLINIC | Facility: CLINIC | Age: 65
End: 2025-05-12

## 2025-05-12 ENCOUNTER — APPOINTMENT (OUTPATIENT)
Dept: RADIOLOGY | Facility: CLINIC | Age: 65
End: 2025-05-12
Attending: ORTHOPAEDIC SURGERY
Payer: COMMERCIAL

## 2025-05-12 ENCOUNTER — OFFICE VISIT (OUTPATIENT)
Dept: OBGYN CLINIC | Facility: CLINIC | Age: 65
End: 2025-05-12
Payer: COMMERCIAL

## 2025-05-12 VITALS — BODY MASS INDEX: 28.73 KG/M2 | WEIGHT: 194 LBS | HEIGHT: 69 IN

## 2025-05-12 DIAGNOSIS — M65.311 TRIGGER FINGER OF RIGHT THUMB: Primary | ICD-10-CM

## 2025-05-12 DIAGNOSIS — M79.644 THUMB PAIN, RIGHT: ICD-10-CM

## 2025-05-12 DIAGNOSIS — M65.331 TRIGGER MIDDLE FINGER OF RIGHT HAND: ICD-10-CM

## 2025-05-12 PROCEDURE — 73140 X-RAY EXAM OF FINGER(S): CPT

## 2025-05-12 PROCEDURE — 99204 OFFICE O/P NEW MOD 45 MIN: CPT | Performed by: ORTHOPAEDIC SURGERY

## 2025-05-12 RX ORDER — SODIUM CHLORIDE, SODIUM LACTATE, POTASSIUM CHLORIDE, CALCIUM CHLORIDE 600; 310; 30; 20 MG/100ML; MG/100ML; MG/100ML; MG/100ML
20 INJECTION, SOLUTION INTRAVENOUS CONTINUOUS
Status: CANCELLED | OUTPATIENT
Start: 2025-05-20

## 2025-05-12 NOTE — H&P (VIEW-ONLY)
ORTHOPAEDIC HAND, WRIST, AND ELBOW OFFICE  VISIT     Name: Abbi Claudio      : 1960      MRN: [id number]  Encounter Provider: Yves Cordero MD  Encounter Date: 2025   Encounter department: [facility]  :  Assessment & Plan  Trigger finger of right thumb  Patient is experiencing right thumb and right long finger.   Treatment options were discussed with patient today.   After discussion today, patient would like proceed with right thumb and right long finger trigger finger release.  Risks and benefits were discussed with patient at today's visit.  Pre and postop protocols were discussed with patient at today's visit.  Patient will meet with surgery scheduler today.  Patient signed informed consent today.   Patient will follow-up for first postop appointment.  Orders:    Case request operating room: Right thumb and long finger trigger finger release; Standing    Trigger middle finger of right hand  Patient is experiencing right thumb and right long finger.   Treatment options were discussed with patient today.   After discussion today, patient would like proceed with right thumb and right long finger trigger finger release.  Risks and benefits were discussed with patient at today's visit.  Pre and postop protocols were discussed with patient at today's visit.  Patient will meet with surgery scheduler today.  Patient signed informed consent today.   Patient will follow-up for first postop appointment.  Orders:    XR thumb right first digit-min 2v; Future    Case request operating room: Right thumb and long finger trigger finger release; Standing      General Discussions:     Trigger FInger: The anatomy and physiology of trigger finger was discussed with the patient today in the office.  Edema and increased contact pressure within the flexor tendons at the A1 pulley can cause pain, crepitation, and limitation of function.  Treatment options include  "resting MP blocking splints, PIP blocking splints, or \"band-aid\" splints to decrease edema, oral anti-inflammatory medications, home or formal therapy exercises, up to 2 steroid injections, or surgical release.  While a majority of patients do respond to conservative treatment, up to 20% may require surgical release.       History of Present Illness   HPI  Chief Complaint   Patient presents with    Right Hand - Pain       Abbi Claudio is a 64 y.o. female who presents for initial evaluation of right thumb pain, clicking, and locking.  Patient states has been going on since November denies any specific mechanism of injuries or traumas.  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  Patient states it is hard for her to open a jar and make a full fist.  Patient states that she has had no treatments done for this in the past.     REVIEW OF SYSTEMS:  General: no fever, no chills  HEENT:  No loss of hearing or eyesight problems  Eyes:  No red eyes  Respiratory:  No coughing, shortness of breath or wheezing  Cardiovascular:  No chest pain, no palpitations  GI:  Abdomen soft nontender, denies nausea  Endocrine:  No muscle weakness, no frequent urination, no excessive thirst  Urinary:  No dysuria, no incontinence  Musculoskeletal: see HPI and PE  SKIN:  No skin rash, no dry skin  Neurological:  No headaches, no confusion  Psychiatric:  No suicide thoughts, no anxiety, no depression  Review of all other systems is negative    Objective   Ht 5' 9\" (1.753 m)   Wt 88 kg (194 lb)   BMI 28.65 kg/m²      General: well developed and well nourished, alert, oriented times 3, and appears comfortable  Psychiatric: Normal  HEENT: Trachea Midline, No torticollis  Cardiovascular: No discernable arrhythmia  Pulmonary: No wheezing or stridor  Abdomen: No rebound or guarding  Extremities: No peripheral edema  Skin: No masses, erythema, lacerations, fluctation, ulcerations  Neurovascular: Sensation Intact " to the Median, Ulnar, Radial Nerve, Motor Intact to the Median, Ulnar, Radial Nerve, and Pulses Intact    Musculoskeletal exam:  Right thumb and long finger:  The affected finger was not postured in a flexed position.  There was tenderness at the level of the A1 pulley.  There was crepitus with flexion and extension of the finger.  Catching/locking was observed during the examination.  long finger nodule and thumb nodule with crepitation.        STUDIES REVIEWED:  Images were reviewed in PACS and demonstrate: stage 3 CMC joint arthritis with osteophyte formation.       PROCEDURES PERFORMED:  Procedures        Scribe Attestation      I,:  Federico Springer am acting as a scribe while in the presence of the attending physician.:       I,:  Yves Cordero MD personally performed the services described in this documentation    as scribed in my presence.:

## 2025-05-12 NOTE — PROGRESS NOTES
ORTHOPAEDIC HAND, WRIST, AND ELBOW OFFICE  VISIT     Name: Abbi Claudio      : 1960      MRN: [id number]  Encounter Provider: Yves Cordero MD  Encounter Date: 2025   Encounter department: [facility]  :  Assessment & Plan  Trigger finger of right thumb  Patient is experiencing right thumb and right long finger.   Treatment options were discussed with patient today.   After discussion today, patient would like proceed with right thumb and right long finger trigger finger release.  Risks and benefits were discussed with patient at today's visit.  Pre and postop protocols were discussed with patient at today's visit.  Patient will meet with surgery scheduler today.  Patient signed informed consent today.   Patient will follow-up for first postop appointment.  Orders:    Case request operating room: Right thumb and long finger trigger finger release; Standing    Trigger middle finger of right hand  Patient is experiencing right thumb and right long finger.   Treatment options were discussed with patient today.   After discussion today, patient would like proceed with right thumb and right long finger trigger finger release.  Risks and benefits were discussed with patient at today's visit.  Pre and postop protocols were discussed with patient at today's visit.  Patient will meet with surgery scheduler today.  Patient signed informed consent today.   Patient will follow-up for first postop appointment.  Orders:    XR thumb right first digit-min 2v; Future    Case request operating room: Right thumb and long finger trigger finger release; Standing      General Discussions:     Trigger FInger: The anatomy and physiology of trigger finger was discussed with the patient today in the office.  Edema and increased contact pressure within the flexor tendons at the A1 pulley can cause pain, crepitation, and limitation of function.  Treatment options include  "resting MP blocking splints, PIP blocking splints, or \"band-aid\" splints to decrease edema, oral anti-inflammatory medications, home or formal therapy exercises, up to 2 steroid injections, or surgical release.  While a majority of patients do respond to conservative treatment, up to 20% may require surgical release.       History of Present Illness   HPI  Chief Complaint   Patient presents with    Right Hand - Pain       Abbi Claudio is a 64 y.o. female who presents for initial evaluation of right thumb pain, clicking, and locking.  Patient states has been going on since November denies any specific mechanism of injuries or traumas.  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  Patient states it is hard for her to open a jar and make a full fist.  Patient states that she has had no treatments done for this in the past.     REVIEW OF SYSTEMS:  General: no fever, no chills  HEENT:  No loss of hearing or eyesight problems  Eyes:  No red eyes  Respiratory:  No coughing, shortness of breath or wheezing  Cardiovascular:  No chest pain, no palpitations  GI:  Abdomen soft nontender, denies nausea  Endocrine:  No muscle weakness, no frequent urination, no excessive thirst  Urinary:  No dysuria, no incontinence  Musculoskeletal: see HPI and PE  SKIN:  No skin rash, no dry skin  Neurological:  No headaches, no confusion  Psychiatric:  No suicide thoughts, no anxiety, no depression  Review of all other systems is negative    Objective   Ht 5' 9\" (1.753 m)   Wt 88 kg (194 lb)   BMI 28.65 kg/m²      General: well developed and well nourished, alert, oriented times 3, and appears comfortable  Psychiatric: Normal  HEENT: Trachea Midline, No torticollis  Cardiovascular: No discernable arrhythmia  Pulmonary: No wheezing or stridor  Abdomen: No rebound or guarding  Extremities: No peripheral edema  Skin: No masses, erythema, lacerations, fluctation, ulcerations  Neurovascular: Sensation Intact " to the Median, Ulnar, Radial Nerve, Motor Intact to the Median, Ulnar, Radial Nerve, and Pulses Intact    Musculoskeletal exam:  Right thumb and long finger:  The affected finger was not postured in a flexed position.  There was tenderness at the level of the A1 pulley.  There was crepitus with flexion and extension of the finger.  Catching/locking was observed during the examination.  long finger nodule and thumb nodule with crepitation.        STUDIES REVIEWED:  Images were reviewed in PACS and demonstrate: stage 3 CMC joint arthritis with osteophyte formation.       PROCEDURES PERFORMED:  Procedures        Scribe Attestation      I,:  Federico Springer am acting as a scribe while in the presence of the attending physician.:       I,:  Yves Cordero MD personally performed the services described in this documentation    as scribed in my presence.:

## 2025-05-13 ENCOUNTER — ANESTHESIA (OUTPATIENT)
Age: 65
End: 2025-05-13

## 2025-05-13 ENCOUNTER — HOSPITAL ENCOUNTER (OUTPATIENT)
Dept: ULTRASOUND IMAGING | Facility: HOSPITAL | Age: 65
Discharge: HOME | End: 2025-05-13
Attending: INTERNAL MEDICINE
Payer: COMMERCIAL

## 2025-05-13 ENCOUNTER — ANESTHESIA EVENT (OUTPATIENT)
Age: 65
End: 2025-05-13

## 2025-05-13 DIAGNOSIS — R93.5 ABNORMAL ABDOMINAL ULTRASOUND: ICD-10-CM

## 2025-05-13 PROCEDURE — 76700 US EXAM ABDOM COMPLETE: CPT

## 2025-05-15 NOTE — PRE-PROCEDURE INSTRUCTIONS
Pre-Surgery Instructions:   Medication Instructions    benzonatate (TESSALON PERLES) 100 mg capsule Uses PRN- OK to take day of surgery- hasn't used within the past week     Medication instructions for day of surgery reviewed. Please take all instructed medications with only a sip of water.       You will receive a call one business day prior to surgery with an arrival time and hospital directions. If your surgery is scheduled on a Monday, the hospital will be calling you on the Friday prior to your surgery. If you have not heard from anyone by 8pm, please call the hospital supervisor through the hospital  at [phone]. ([place] [phone] or [place] [phone]).    Do not eat or drink anything after midnight the night before your surgery, including candy, mints, lifesavers, or chewing gum. Do not drink alcohol 24hrs before your surgery. Try not to smoke at least 24hrs before your surgery.       Follow the pre surgery showering instructions as listed in the “My Surgical Experience Booklet” or otherwise provided by your surgeon's office. Do not use a blade to shave the surgical area 1 week before surgery. It is okay to use a clean electric clippers up to 24 hours before surgery. Do not apply any lotions, creams, including makeup, cologne, deodorant, or perfumes after showering on the day of your surgery. Do not use dry shampoo, hair spray, hair gel, or any type of hair products.     No contact lenses, eye make-up, or artificial eyelashes. Remove nail polish, including gel polish, and any artificial, gel, or acrylic nails if possible. Remove all jewelry including rings and body piercing jewelry.     Wear causal clothing that is easy to take on and off. Consider your type of surgery.    Keep any valuables, jewelry, piercings at home. Please bring any specially ordered equipment (sling, braces) if indicated.    Arrange for a responsible person to drive you to and from the hospital on the day of your  surgery. Please confirm the visitor policy for the day of your procedure when you receive your phone call with an arrival time.     Call the surgeon's office with any new illnesses, exposures, or additional questions prior to surgery.    Please reference your “My Surgical Experience Booklet” for additional information to prepare for your upcoming surgery.

## 2025-05-19 ENCOUNTER — ANESTHESIA EVENT (OUTPATIENT)
Age: 65
End: 2025-05-19
Payer: COMMERCIAL

## 2025-05-19 ENCOUNTER — TELEPHONE (OUTPATIENT)
Age: 65
End: 2025-05-19

## 2025-05-19 NOTE — TELEPHONE ENCOUNTER
Caller: patient    Doctor: Dr. Cordero    Reason for call: patient has surgery tomorrow and cannot find her hibiclens, she is wondering if this is something she can go  at the [facility]?     Call back#: [phone]

## 2025-05-20 ENCOUNTER — ANESTHESIA (OUTPATIENT)
Age: 65
End: 2025-05-20
Payer: COMMERCIAL

## 2025-05-20 ENCOUNTER — HOSPITAL ENCOUNTER (OUTPATIENT)
Age: 65
Setting detail: OUTPATIENT SURGERY
Discharge: HOME | End: 2025-05-20
Attending: ORTHOPAEDIC SURGERY | Admitting: ORTHOPAEDIC SURGERY
Payer: COMMERCIAL

## 2025-05-20 VITALS
OXYGEN SATURATION: 97 % | HEART RATE: 60 BPM | DIASTOLIC BLOOD PRESSURE: 72 MMHG | SYSTOLIC BLOOD PRESSURE: 115 MMHG | BODY MASS INDEX: 28.29 KG/M2 | HEIGHT: 69 IN | TEMPERATURE: 97.2 F | RESPIRATION RATE: 14 BRPM | WEIGHT: 191 LBS

## 2025-05-20 DIAGNOSIS — M65.311 TRIGGER FINGER OF RIGHT THUMB: Primary | ICD-10-CM

## 2025-05-20 DIAGNOSIS — M65.331 TRIGGER MIDDLE FINGER OF RIGHT HAND: ICD-10-CM

## 2025-05-20 PROCEDURE — 26055 INCISE FINGER TENDON SHEATH: CPT | Performed by: ORTHOPAEDIC SURGERY

## 2025-05-20 RX ORDER — PROPOFOL 10 MG/ML
INJECTION, EMULSION INTRAVENOUS AS NEEDED
Status: DISCONTINUED | OUTPATIENT
Start: 2025-05-20 | End: 2025-05-20

## 2025-05-20 RX ORDER — FENTANYL CITRATE/PF 50 MCG/ML
25 SYRINGE (ML) INJECTION
Status: DISCONTINUED | OUTPATIENT
Start: 2025-05-20 | End: 2025-05-20 | Stop reason: HOSPADM

## 2025-05-20 RX ORDER — LIDOCAINE HYDROCHLORIDE AND EPINEPHRINE 10; 10 MG/ML; UG/ML
INJECTION, SOLUTION INFILTRATION; PERINEURAL AS NEEDED
Status: DISCONTINUED | OUTPATIENT
Start: 2025-05-20 | End: 2025-05-20 | Stop reason: HOSPADM

## 2025-05-20 RX ORDER — HYDROMORPHONE HCL IN WATER/PF 6 MG/30 ML
0.2 PATIENT CONTROLLED ANALGESIA SYRINGE INTRAVENOUS
Status: DISCONTINUED | OUTPATIENT
Start: 2025-05-20 | End: 2025-05-20 | Stop reason: HOSPADM

## 2025-05-20 RX ORDER — COVID-19 ANTIGEN TEST
220 KIT MISCELLANEOUS 2 TIMES DAILY
Qty: 60 CAPSULE | Refills: 0 | Status: SHIPPED | OUTPATIENT
Start: 2025-05-20 | End: 2025-06-19

## 2025-05-20 RX ORDER — MIDAZOLAM HYDROCHLORIDE 2 MG/2ML
INJECTION, SOLUTION INTRAMUSCULAR; INTRAVENOUS AS NEEDED
Status: DISCONTINUED | OUTPATIENT
Start: 2025-05-20 | End: 2025-05-20

## 2025-05-20 RX ORDER — ONDANSETRON 2 MG/ML
4 INJECTION INTRAMUSCULAR; INTRAVENOUS ONCE AS NEEDED
Status: DISCONTINUED | OUTPATIENT
Start: 2025-05-20 | End: 2025-05-20 | Stop reason: HOSPADM

## 2025-05-20 RX ORDER — PROPOFOL 10 MG/ML
INJECTION, EMULSION INTRAVENOUS CONTINUOUS PRN
Status: DISCONTINUED | OUTPATIENT
Start: 2025-05-20 | End: 2025-05-20

## 2025-05-20 RX ORDER — ACETAMINOPHEN 325 MG/1
650 TABLET ORAL EVERY 6 HOURS PRN
Status: DISCONTINUED | OUTPATIENT
Start: 2025-05-20 | End: 2025-05-20 | Stop reason: HOSPADM

## 2025-05-20 RX ORDER — TRAMADOL HYDROCHLORIDE 50 MG/1
50 TABLET ORAL EVERY 6 HOURS PRN
Qty: 5 TABLET | Refills: 0 | Status: SHIPPED | OUTPATIENT
Start: 2025-05-20 | End: 2025-05-30

## 2025-05-20 RX ORDER — TRAMADOL HYDROCHLORIDE 50 MG/1
50 TABLET ORAL EVERY 6 HOURS PRN
Status: DISCONTINUED | OUTPATIENT
Start: 2025-05-20 | End: 2025-05-20 | Stop reason: HOSPADM

## 2025-05-20 RX ORDER — SODIUM CHLORIDE, SODIUM LACTATE, POTASSIUM CHLORIDE, CALCIUM CHLORIDE 600; 310; 30; 20 MG/100ML; MG/100ML; MG/100ML; MG/100ML
20 INJECTION, SOLUTION INTRAVENOUS CONTINUOUS
Status: DISCONTINUED | OUTPATIENT
Start: 2025-05-20 | End: 2025-05-20 | Stop reason: HOSPADM

## 2025-05-20 RX ORDER — SENNOSIDES 8.6 MG
650 CAPSULE ORAL EVERY 8 HOURS PRN
Qty: 30 TABLET | Refills: 0 | Status: SHIPPED | OUTPATIENT
Start: 2025-05-20

## 2025-05-20 RX ORDER — LIDOCAINE HYDROCHLORIDE 10 MG/ML
INJECTION, SOLUTION EPIDURAL; INFILTRATION; INTRACAUDAL; PERINEURAL AS NEEDED
Status: DISCONTINUED | OUTPATIENT
Start: 2025-05-20 | End: 2025-05-20

## 2025-05-20 RX ORDER — ONDANSETRON 2 MG/ML
4 INJECTION INTRAMUSCULAR; INTRAVENOUS EVERY 6 HOURS PRN
Status: DISCONTINUED | OUTPATIENT
Start: 2025-05-20 | End: 2025-05-20 | Stop reason: HOSPADM

## 2025-05-20 RX ADMIN — PROPOFOL 80 MG: 10 INJECTION, EMULSION INTRAVENOUS at 10:40

## 2025-05-20 RX ADMIN — LIDOCAINE HYDROCHLORIDE 50 MG: 10 INJECTION, SOLUTION EPIDURAL; INFILTRATION; INTRACAUDAL; PERINEURAL at 10:40

## 2025-05-20 RX ADMIN — MIDAZOLAM 2 MG: 1 INJECTION INTRAMUSCULAR; INTRAVENOUS at 10:35

## 2025-05-20 RX ADMIN — SODIUM CHLORIDE, SODIUM LACTATE, POTASSIUM CHLORIDE, AND CALCIUM CHLORIDE 20 ML/HR: .6; .31; .03; .02 INJECTION, SOLUTION INTRAVENOUS at 08:53

## 2025-05-20 RX ADMIN — PROPOFOL 80 MCG/KG/MIN: 10 INJECTION, EMULSION INTRAVENOUS at 10:40

## 2025-05-20 NOTE — INTERVAL H&P NOTE
H&P reviewed. After examining the patient I find no changes in the patients condition since the H&P had been written.    Vitals:    05/20/25 0841   BP: 115/72   Pulse: 59   Resp: 12   Temp: (!) 97.1 °F (36.2 °C)   SpO2: 97%

## 2025-05-20 NOTE — ANESTHESIA PREPROCEDURE EVALUATION
Procedure:  Right thumb and long finger trigger finger release (Right: Hand)    Relevant Problems   CARDIO   (+) Peripheral vascular disease (HCC)      Endocrine   (+) Disorder of thyroid gland      Other   (+) Soft tissue mass      Lab Results   Component Value Date    WBC 4.36 05/05/2025    HGB 12.6 05/05/2025    HCT 39.3 05/05/2025    MCV 93 05/05/2025     05/05/2025     Lab Results   Component Value Date    SODIUM 141 05/05/2025    K 4.1 05/05/2025     05/05/2025    CO2 28 05/05/2025    AGAP 8 05/05/2025    BUN 16 05/05/2025    CREATININE 0.58 (L) 05/05/2025    GLUC 87 03/13/2024    GLUF 95 05/05/2025    CALCIUM 9.0 05/05/2025    AST 16 05/05/2025    ALT 14 05/05/2025    ALKPHOS 69 05/05/2025    TP 6.7 05/05/2025    TBILI 1.02 (H) 05/05/2025    EGFR 98 05/05/2025     Lab Results   Component Value Date    PTT 30 02/25/2024     Lab Results   Component Value Date    INR 1.05 02/25/2024    PROTIME 14.3 02/25/2024       Physical Exam    Airway     Mallampati score: II  TM Distance: >3 FB  Neck ROM: full      Cardiovascular  Rhythm: regular, Rate: normalCardiovascular exam normal    Dental   No notable dental hx     Pulmonary  Pulmonary exam normal Breath sounds clear to auscultation    Neurological  - normal exam    Other Findings  post-pubertal.      Anesthesia Plan  ASA Score- 2     Anesthesia Type- IV sedation with anesthesia with ASA Monitors.         Additional Monitors:     Airway Plan:            Plan Factors-Exercise tolerance (METS): >4 METS.    Chart reviewed. EKG reviewed.  Existing labs reviewed. Patient summary reviewed.          Obstructive sleep apnea risk education given perioperatively.        Induction-     Postoperative Plan- Plan for postoperative opioid use.   Monitoring Plan - Monitoring plan - standard ASA monitoring  Post Operative Pain Plan - non-opiod analgesics and plan for postoperative opioid use        Informed Consent- Anesthetic plan and risks discussed with patient.  I  personally reviewed this patient with the CRNA. Discussed and agreed on the Anesthesia Plan with the CRNA..      NPO Status:  Vitals Value Taken Time   Date of last liquid 05/19/25 05/20/25 08:42   Time of last liquid 2315 05/20/25 08:42   Date of last solid 05/19/25 05/20/25 08:42   Time of last solid 2315 05/20/25 08:42

## 2025-05-20 NOTE — OP NOTE
OPERATIVE REPORT  PATIENT NAME: Abbi Claudio  :  1960  MRN: [id number]  Pt Location: [facility]    SURGERY DATE: 25    Surgeons and Role:     * Yves Cordero MD - Primary     * Jacob Palmer PA-C - Observing     * Georges Trevino MD    Pre-Op Diagnosis:  Trigger finger of right thumb [M65.311]  Trigger middle finger of right hand [M65.331]    Post-Op Diagnosis:  Trigger finger of right thumb [M65.311]  Trigger middle finger of right hand [M65.331]    Procedure(s) (LRB):  Right thumb and long finger trigger finger release (Right)    Specimen(s):  No specimens collected during this procedure.    Estimated Blood Loss:   Minimal      Anesthesia Type:   IV Sedation with Anesthesia    Operative Indications:  The patient has a history of trigger finger right thumb and long finger that was recalcitrant to conservative management.  The decision was made to bring the patient to the operating room for right thumb and long finger trigger finger releases.  Risks of the procedure were explained which include, but are not limited to bleeding; infection; damage to nerves, arteries,veins, tendons; scar; pain; need for reoperation; failure to give desired result; and risks of anaesthesia.  All questions were answered to satisfaction and they were willing to proceed.         Operative Findings:  Right thumb and long finger trigger finger successfully released    Complications:   None    Procedure and Technique:  After the patient, site, and procedure were identified, the patient was brought into the operating room in a supine position.  Local anaesthesia and sedation were provided.  A tourniquet was not used.  The  right upper extremity was then prepped and drapped in a normal, sterile, orthopedic fashion.    After the patient, site, and procedure were once again identified, attention was turned to the right long finger.  An incision was made over the flexor tendon sheath at the level of the A1  pulley.  Dissection was carried out in-line with the flexor tendon sheath and the radial and ulnar digital artery and nerve were protected.  The A1 pulley was identified at the base of the incision.  Under direct visualization, the A1 pulley was divided along the midline in its entirety with care taken to avoid injury to the underlying tendon.  The tendons were examined to ensure that no further catching, popping, clicking or locking occurred with motion of the finger.     After the patient, site, and procedure were once again identified, attention was turned to the right thumb.  An incision was made over the flexor tendon sheath at the level of the A1 pulley.  Dissection was carried out in-line with the flexor tendon sheath and the radial and ulnar digital artery and nerve were protected.  The A1 pulley was identified at the base of the incision.  Under direct visualization, the A1 pulley was divided along the midline in its entirety with care taken to avoid injury to the underlying tendon.  The tendons were examined to ensure that no further catching, popping, clicking or locking occurred with motion of the finger.       At the completion of the procedure, hemostasis was obtained with cautery and direct pressure.  The wounds were copiously irrigated with sterile solution.  The wounds were closed with Prolene.  Sterile dressings were applied, including Xeroform, gauze, tweeners, webril, ACE.  Please note, all sponge, needle, and instrument counts were correct prior to closure.  Loupe magnification was utilized.   The patient tolerated the procedure well.     I was present for all critical portions of the procedure.  Resident physician was available for assistance with prepping, draping, and retraction.    Patient Disposition:  PACU  and hemodynamically stable    SIGNATURE: Yves Cordero MD  DATE: 05/20/25  TIME: 11:06 AM

## 2025-05-20 NOTE — ANESTHESIA POSTPROCEDURE EVALUATION
Post-Op Assessment Note    CV Status:  Stable    Pain management: adequate    Multimodal analgesia used between 6 hours prior to anesthesia start to PACU discharge    Mental Status:  Alert and awake   Hydration Status:  Stable   PONV Controlled:  None   Airway Patency:  Patent     Post Op Vitals Reviewed: Yes    No anethesia notable event occurred.    Staff: Anesthesiologist           Last Filed PACU Vitals:  Vitals Value Taken Time   Temp 97.2 °F (36.2 °C) 05/20/25 11:15   Pulse 52 05/20/25 11:37   /75 05/20/25 11:30   Resp 14 05/20/25 11:37   SpO2 99 % 05/20/25 11:37   Vitals shown include unfiled device data.    Modified Luther:     Vitals Value Taken Time   Activity 2 05/20/25 11:30   Respiration 2 05/20/25 11:30   Circulation 2 05/20/25 11:30   Consciousness 2 05/20/25 11:30   Oxygen Saturation 2 05/20/25 11:30     Modified Luther Score: 10

## 2025-05-20 NOTE — ANESTHESIA POSTPROCEDURE EVALUATION
Post-Op Assessment Note    CV Status:  Stable  Pain Score: 0    Pain management: adequate       Mental Status:  Alert and awake   Hydration Status:  Euvolemic   PONV Controlled:  Controlled   Airway Patency:  Patent     Post Op Vitals Reviewed: Yes    No anethesia notable event occurred.    Staff: Anesthesiologist, CRNA           Last Filed PACU Vitals:  Vitals Value Taken Time   Temp 97.2    Pulse 62    /68    Resp 16    SpO2 97

## 2025-06-02 ENCOUNTER — OFFICE VISIT (OUTPATIENT)
Dept: OBGYN CLINIC | Facility: CLINIC | Age: 65
End: 2025-06-02

## 2025-06-02 VITALS — WEIGHT: 191 LBS | HEIGHT: 69 IN | BODY MASS INDEX: 28.29 KG/M2

## 2025-06-02 DIAGNOSIS — M65.331 TRIGGER MIDDLE FINGER OF RIGHT HAND: ICD-10-CM

## 2025-06-02 DIAGNOSIS — M65.311 TRIGGER FINGER OF RIGHT THUMB: Primary | ICD-10-CM

## 2025-06-02 PROCEDURE — 99024 POSTOP FOLLOW-UP VISIT: CPT | Performed by: ORTHOPAEDIC SURGERY

## 2025-06-02 NOTE — PROGRESS NOTES
"    ORTHOPAEDIC HAND, WRIST, AND ELBOW OFFICE  VISIT     Name: Abbi Claudio      : 1960      MRN: [id number]  Encounter Provider: Yves Cordero MD  Encounter Date: 2025   Encounter department: [facility] [place]  :  Assessment & Plan  Trigger finger of right thumb  Right thumb and long finger trigger finger release - Right on 2025  Patient was advised to avoid soaking for 2 to 3 days to allow the incision to finish healing  They was advised to use heat massage as demonstrated in the office  They will follow-up with us in 6-8 weeks for re-evaluation         Trigger middle finger of right hand  Right thumb and long finger trigger finger release - Right on 2025  Patient was advised to avoid soaking for 2 to 3 days to allow the incision to finish healing  They was advised to use heat massage as demonstrated in the office  They will follow-up with us in 6-8 weeks for re-evaluation of crepitation                History of Present Illness   HPI  Chief Complaint   Patient presents with   • Right Thumb - Post-op, Suture / Staple Removal     TFR -    • Right Middle Finger - Post-op, Suture / Staple Removal     TFR -        SUBJECTIVE:  Abbi Claudio is a 64 y.o. female who presents for follow up after Right thumb and long finger trigger finger release - Right on 2025.  Today patient has some stiffness for her long finger.  She has no clicking and locking into the thumb.  She states she will occasionally feel a clicking sensation into the long finger for second morning.       PHYSICAL EXAMINATION:  Vital signs: Ht 5' 9\" (1.753 m)   Wt 86.6 kg (191 lb)   BMI 28.21 kg/m²   General: well developed and well nourished, alert, oriented times 3, and appears comfortable  Psychiatric: Normal    MUSCULOSKELETAL EXAMINATION:  Incision: Clean, dry, intact  Range of Motion: As expected, opposition intact, and full composite fist possible  Neurovascular " status: Neuro intact, good cap refill  Activity Restrictions: No restrictions  Done today: Sutures out      STUDIES REVIEWED:  No Studies to review      PROCEDURES PERFORMED:  Procedures  No Procedures performed today    Scribe Attestation    I,:  Jacob Palmer PA-C am acting as a scribe while in the presence of the attending physician.:       I,:  Yves Cordero MD personally performed the services described in this documentation    as scribed in my presence.:

## 2025-08-21 DIAGNOSIS — Z01.84 IMMUNITY TO VARICELLA DETERMINED BY SEROLOGIC TEST: ICD-10-CM

## 2025-08-21 DIAGNOSIS — Z01.84 IMMUNITY TO MEASLES, MUMPS, AND RUBELLA DETERMINED BY SEROLOGIC TEST: Primary | ICD-10-CM

## (undated) DEVICE — CUFF TOURNIQUET 18 X 4 IN QUICK CONNECT DISP 1 BLADDER

## (undated) DEVICE — GLOVE INDICATOR PI UNDERGLOVE SZ 7.5 BLUE

## (undated) DEVICE — INTENDED FOR TISSUE SEPARATION, AND OTHER PROCEDURES THAT REQUIRE A SHARP SURGICAL BLADE TO PUNCTURE OR CUT.: Brand: BARD-PARKER SAFETY BLADES SIZE 15, STERILE

## (undated) DEVICE — BETHLEHEM UNIVERSAL OUTPATIENT: Brand: CARDINAL HEALTH

## (undated) DEVICE — PLUMEPEN PRO 10FT

## (undated) DEVICE — GLOVE SRG BIOGEL 7.5

## (undated) DEVICE — WET SKIN PREP TRAY: Brand: MEDLINE INDUSTRIES, INC.

## (undated) DEVICE — DRAPE EQUIPMENT RF WAND

## (undated) DEVICE — GLOVE SRG BIOGEL ECLIPSE 7.5

## (undated) DEVICE — INTENDED FOR TISSUE SEPARATION, AND OTHER PROCEDURES THAT REQUIRE A SHARP SURGICAL BLADE TO PUNCTURE OR CUT.: Brand: BARD-PARKER ® CARBON RIB-BACK BLADES

## (undated) DEVICE — TUBING SUCTION 5MM X 12 FT

## (undated) DEVICE — GLOVE INDICATOR PI UNDERGLOVE SZ 8 BLUE

## (undated) DEVICE — 3M™ TEGADERM™ TRANSPARENT FILM DRESSING FRAME STYLE, 1624W, 2-3/8 IN X 2-3/4 IN (6 CM X 7 CM), 100/CT 4CT/CASE: Brand: 3M™ TEGADERM™

## (undated) DEVICE — MINOR PROCEDURE DRAPE: Brand: CONVERTORS

## (undated) DEVICE — POOLE SUCTION HANDLE: Brand: CARDINAL HEALTH

## (undated) DEVICE — PREP PAD BNS: Brand: CONVERTORS

## (undated) DEVICE — 3M™ TEGADERM™ TRANSPARENT FILM DRESSING FRAME STYLE, 1626W, 4 IN X 4-3/4 IN (10 CM X 12 CM), 50/CT 4CT/CASE: Brand: 3M™ TEGADERM™

## (undated) DEVICE — GLOVE PI ULTRA TOUCH SZ.8.0

## (undated) DEVICE — SCD SEQUENTIAL COMPRESSION COMFORT SLEEVE MEDIUM KNEE LENGTH: Brand: KENDALL SCD

## (undated) DEVICE — LIGHT HANDLE COVER SLEEVE DISP BLUE STELLAR

## (undated) DEVICE — GAUZE SPONGES,16 PLY: Brand: CURITY

## (undated) DEVICE — STERILE BETHLEHEM PLASTIC HAND: Brand: CARDINAL HEALTH

## (undated) DEVICE — NEEDLE 25G X 1 1/2

## (undated) DEVICE — SUT VICRYL 4-0 PS-2 27 IN J426H

## (undated) DEVICE — SUT PROLENE 4-0 PS-2 18 IN 8682G

## (undated) DEVICE — 3M™ STERI-STRIP™ REINFORCED ADHESIVE SKIN CLOSURES, R1546, 1/4 IN X 4 IN (6 MM X 100 MM), 10 STRIPS/ENVELOPE: Brand: 3M™ STERI-STRIP™

## (undated) DEVICE — HYDROPHILIC WOUND DRESSING WITH ZINC PLUS VITAMINS A AND B6.: Brand: DERMAGRAN®-B

## (undated) DEVICE — PAD GROUNDING ADULT

## (undated) DEVICE — GLOVE PI ULTRA TOUCH SZ.8.5

## (undated) DEVICE — CHLORAPREP HI-LITE 26ML ORANGE

## (undated) DEVICE — SUT VICRYL 3-0 SH 27 IN J416H

## (undated) DEVICE — 3M™ STERI-STRIP™ REINFORCED ADHESIVE SKIN CLOSURES, R1542, 1/4 IN X 1-1/2 IN (6 MM X 38 MM), 6 STRIPS/ENVELOPE: Brand: 3M™ STERI-STRIP™

## (undated) DEVICE — BETHLEHEM UNIVERSAL MINOR GEN: Brand: CARDINAL HEALTH